# Patient Record
Sex: FEMALE | Race: WHITE | HISPANIC OR LATINO
[De-identification: names, ages, dates, MRNs, and addresses within clinical notes are randomized per-mention and may not be internally consistent; named-entity substitution may affect disease eponyms.]

---

## 2017-01-19 ENCOUNTER — RX RENEWAL (OUTPATIENT)
Age: 32
End: 2017-01-19

## 2017-01-24 ENCOUNTER — RESULT REVIEW (OUTPATIENT)
Age: 32
End: 2017-01-24

## 2017-02-06 ENCOUNTER — APPOINTMENT (OUTPATIENT)
Dept: INTERNAL MEDICINE | Facility: CLINIC | Age: 32
End: 2017-02-06

## 2017-02-08 ENCOUNTER — OUTPATIENT (OUTPATIENT)
Dept: OUTPATIENT SERVICES | Facility: HOSPITAL | Age: 32
LOS: 1 days | End: 2017-02-08
Payer: COMMERCIAL

## 2017-02-08 VITALS
DIASTOLIC BLOOD PRESSURE: 83 MMHG | SYSTOLIC BLOOD PRESSURE: 118 MMHG | TEMPERATURE: 98 F | RESPIRATION RATE: 16 BRPM | HEART RATE: 92 BPM | WEIGHT: 212.97 LBS | HEIGHT: 63.5 IN

## 2017-02-08 DIAGNOSIS — Z64.1 PROBLEMS RELATED TO MULTIPARITY: ICD-10-CM

## 2017-02-08 DIAGNOSIS — Z01.818 ENCOUNTER FOR OTHER PREPROCEDURAL EXAMINATION: ICD-10-CM

## 2017-02-08 DIAGNOSIS — G43.909 MIGRAINE, UNSPECIFIED, NOT INTRACTABLE, WITHOUT STATUS MIGRAINOSUS: ICD-10-CM

## 2017-02-08 DIAGNOSIS — F41.9 ANXIETY DISORDER, UNSPECIFIED: ICD-10-CM

## 2017-02-08 DIAGNOSIS — G47.00 INSOMNIA, UNSPECIFIED: ICD-10-CM

## 2017-02-08 LAB
ALBUMIN SERPL ELPH-MCNC: 3.9 G/DL — SIGNIFICANT CHANGE UP (ref 3.3–5)
ALP SERPL-CCNC: 79 U/L — SIGNIFICANT CHANGE UP (ref 40–120)
ALT FLD-CCNC: 25 U/L — SIGNIFICANT CHANGE UP (ref 12–78)
ANION GAP SERPL CALC-SCNC: 13 MMOL/L — SIGNIFICANT CHANGE UP (ref 5–17)
AST SERPL-CCNC: 19 U/L — SIGNIFICANT CHANGE UP (ref 15–37)
BILIRUB SERPL-MCNC: 0.3 MG/DL — SIGNIFICANT CHANGE UP (ref 0.2–1.2)
BUN SERPL-MCNC: 11 MG/DL — SIGNIFICANT CHANGE UP (ref 7–23)
CALCIUM SERPL-MCNC: 8.8 MG/DL — SIGNIFICANT CHANGE UP (ref 8.5–10.1)
CHLORIDE SERPL-SCNC: 104 MMOL/L — SIGNIFICANT CHANGE UP (ref 96–108)
CO2 SERPL-SCNC: 23 MMOL/L — SIGNIFICANT CHANGE UP (ref 22–31)
CREAT SERPL-MCNC: 0.6 MG/DL — SIGNIFICANT CHANGE UP (ref 0.5–1.3)
GLUCOSE SERPL-MCNC: 73 MG/DL — SIGNIFICANT CHANGE UP (ref 70–99)
HCG SERPL-ACNC: <1 MIU/ML — SIGNIFICANT CHANGE UP
HCT VFR BLD CALC: 39.9 % — SIGNIFICANT CHANGE UP (ref 34.5–45)
HGB BLD-MCNC: 13.5 G/DL — SIGNIFICANT CHANGE UP (ref 11.5–15.5)
MCHC RBC-ENTMCNC: 28.8 PG — SIGNIFICANT CHANGE UP (ref 27–34)
MCHC RBC-ENTMCNC: 33.9 GM/DL — SIGNIFICANT CHANGE UP (ref 32–36)
MCV RBC AUTO: 84.8 FL — SIGNIFICANT CHANGE UP (ref 80–100)
PLATELET # BLD AUTO: 266 K/UL — SIGNIFICANT CHANGE UP (ref 150–400)
POTASSIUM SERPL-MCNC: 3.5 MMOL/L — SIGNIFICANT CHANGE UP (ref 3.5–5.3)
POTASSIUM SERPL-SCNC: 3.5 MMOL/L — SIGNIFICANT CHANGE UP (ref 3.5–5.3)
PROT SERPL-MCNC: 8.1 G/DL — SIGNIFICANT CHANGE UP (ref 6–8.3)
RBC # BLD: 4.71 M/UL — SIGNIFICANT CHANGE UP (ref 3.8–5.2)
RBC # FLD: 12.2 % — SIGNIFICANT CHANGE UP (ref 10.3–14.5)
SODIUM SERPL-SCNC: 140 MMOL/L — SIGNIFICANT CHANGE UP (ref 135–145)
WBC # BLD: 8.7 K/UL — SIGNIFICANT CHANGE UP (ref 3.8–10.5)
WBC # FLD AUTO: 8.7 K/UL — SIGNIFICANT CHANGE UP (ref 3.8–10.5)

## 2017-02-08 RX ORDER — DULOXETINE HYDROCHLORIDE 30 MG/1
1 CAPSULE, DELAYED RELEASE ORAL
Qty: 0 | Refills: 0 | COMMUNITY

## 2017-02-08 NOTE — H&P PST ADULT - ASSESSMENT
32 yo female here with PMHX of insomnia, depression/anxiety, migraines, here for PST clearance for Laparoscopic bilateral tubal ligation with Dr. Acevedo on 02/16/2017

## 2017-02-08 NOTE — H&P PST ADULT - NSANTHOSAYNRD_GEN_A_CORE
No. REID screening performed.  STOP BANG Legend: 0-2 = LOW Risk; 3-4 = INTERMEDIATE Risk; 5-8 = HIGH Risk

## 2017-02-08 NOTE — H&P PST ADULT - PMH
Anxiety and depression    Diverticulitis of intestine without perforation or abscess with bleeding    Insomnia    Migraines    Problems related to multiparity

## 2017-02-08 NOTE — H&P PST ADULT - HISTORY OF PRESENT ILLNESS
30 yo female here for PST clearance with pmhx of depression, anxiety, insomnia here for Laparoscopic bilateral tubal ligation with Dr. Acevedo on 2017.  LMP: 2017 (does not have real periods, but does have cramping due to MIRENA).  .  Pt does not want anymore children.  Denies fevers, SOB, chest pain, back pain, general complaints, urinary or bowel issues, abdominal pain.

## 2017-02-08 NOTE — H&P PST ADULT - FAMILY HISTORY
Father  Still living? Unknown  Family history of colonic diverticulitis, Age at diagnosis: Age Unknown  Family history of Crohn's disease, Age at diagnosis: Age Unknown     Mother  Still living? Unknown  Family history of colonic diverticulitis, Age at diagnosis: Age Unknown

## 2017-02-08 NOTE — H&P PST ADULT - PROBLEM SELECTOR PLAN 1
PST clearance, labs drawn, Scheduled for surgery with Dr. Acevedo 02/16/2017  Instructions reviewed with patient  hibiclens given and instructed on use

## 2017-02-13 ENCOUNTER — APPOINTMENT (OUTPATIENT)
Dept: INTERNAL MEDICINE | Facility: CLINIC | Age: 32
End: 2017-02-13

## 2017-02-15 ENCOUNTER — RESULT REVIEW (OUTPATIENT)
Age: 32
End: 2017-02-15

## 2017-02-15 RX ORDER — SODIUM CHLORIDE 9 MG/ML
1000 INJECTION, SOLUTION INTRAVENOUS
Qty: 0 | Refills: 0 | Status: DISCONTINUED | OUTPATIENT
Start: 2017-02-16 | End: 2017-02-16

## 2017-02-15 RX ORDER — ACETAMINOPHEN 500 MG
975 TABLET ORAL ONCE
Qty: 0 | Refills: 0 | Status: COMPLETED | OUTPATIENT
Start: 2017-02-16 | End: 2017-02-16

## 2017-02-16 ENCOUNTER — OUTPATIENT (OUTPATIENT)
Dept: OUTPATIENT SERVICES | Facility: HOSPITAL | Age: 32
LOS: 1 days | Discharge: HOME | End: 2017-02-16
Payer: COMMERCIAL

## 2017-02-16 VITALS
HEART RATE: 86 BPM | HEIGHT: 63.5 IN | RESPIRATION RATE: 16 BRPM | SYSTOLIC BLOOD PRESSURE: 145 MMHG | DIASTOLIC BLOOD PRESSURE: 94 MMHG | OXYGEN SATURATION: 100 % | TEMPERATURE: 99 F | WEIGHT: 212.97 LBS

## 2017-02-16 VITALS
DIASTOLIC BLOOD PRESSURE: 74 MMHG | OXYGEN SATURATION: 98 % | RESPIRATION RATE: 14 BRPM | SYSTOLIC BLOOD PRESSURE: 115 MMHG | HEART RATE: 78 BPM

## 2017-02-16 DIAGNOSIS — Z64.1 PROBLEMS RELATED TO MULTIPARITY: ICD-10-CM

## 2017-02-16 DIAGNOSIS — Z01.818 ENCOUNTER FOR OTHER PREPROCEDURAL EXAMINATION: ICD-10-CM

## 2017-02-16 LAB — HCG UR QL: NEGATIVE — SIGNIFICANT CHANGE UP

## 2017-02-16 PROCEDURE — 88304 TISSUE EXAM BY PATHOLOGIST: CPT | Mod: 26

## 2017-02-16 PROCEDURE — 88300 SURGICAL PATH GROSS: CPT | Mod: 26,59

## 2017-02-16 RX ORDER — LEVONORGESTREL 1.5 MG/1
1 TABLET ORAL
Qty: 0 | Refills: 0 | COMMUNITY

## 2017-02-16 RX ORDER — OXYCODONE HYDROCHLORIDE 5 MG/1
5 TABLET ORAL ONCE
Qty: 0 | Refills: 0 | Status: DISCONTINUED | OUTPATIENT
Start: 2017-02-16 | End: 2017-02-16

## 2017-02-16 RX ORDER — SODIUM CHLORIDE 9 MG/ML
1000 INJECTION, SOLUTION INTRAVENOUS
Qty: 0 | Refills: 0 | Status: DISCONTINUED | OUTPATIENT
Start: 2017-02-16 | End: 2017-02-16

## 2017-02-16 RX ORDER — HYDROMORPHONE HYDROCHLORIDE 2 MG/ML
0.5 INJECTION INTRAMUSCULAR; INTRAVENOUS; SUBCUTANEOUS
Qty: 0 | Refills: 0 | Status: DISCONTINUED | OUTPATIENT
Start: 2017-02-16 | End: 2017-02-16

## 2017-02-16 RX ORDER — OXYCODONE HYDROCHLORIDE 5 MG/1
1 TABLET ORAL
Qty: 12 | Refills: 0 | OUTPATIENT
Start: 2017-02-16 | End: 2017-02-19

## 2017-02-16 RX ADMIN — SODIUM CHLORIDE 30 MILLILITER(S): 9 INJECTION, SOLUTION INTRAVENOUS at 10:42

## 2017-02-16 RX ADMIN — OXYCODONE HYDROCHLORIDE 5 MILLIGRAM(S): 5 TABLET ORAL at 14:20

## 2017-02-16 RX ADMIN — Medication 975 MILLIGRAM(S): at 10:29

## 2017-02-16 RX ADMIN — HYDROMORPHONE HYDROCHLORIDE 0.5 MILLIGRAM(S): 2 INJECTION INTRAMUSCULAR; INTRAVENOUS; SUBCUTANEOUS at 13:35

## 2017-02-16 RX ADMIN — HYDROMORPHONE HYDROCHLORIDE 0.5 MILLIGRAM(S): 2 INJECTION INTRAMUSCULAR; INTRAVENOUS; SUBCUTANEOUS at 13:22

## 2017-02-16 RX ADMIN — HYDROMORPHONE HYDROCHLORIDE 0.5 MILLIGRAM(S): 2 INJECTION INTRAMUSCULAR; INTRAVENOUS; SUBCUTANEOUS at 13:40

## 2017-02-16 RX ADMIN — HYDROMORPHONE HYDROCHLORIDE 0.5 MILLIGRAM(S): 2 INJECTION INTRAMUSCULAR; INTRAVENOUS; SUBCUTANEOUS at 13:07

## 2017-02-16 RX ADMIN — HYDROMORPHONE HYDROCHLORIDE 0.5 MILLIGRAM(S): 2 INJECTION INTRAMUSCULAR; INTRAVENOUS; SUBCUTANEOUS at 13:55

## 2017-02-16 RX ADMIN — SODIUM CHLORIDE 125 MILLILITER(S): 9 INJECTION, SOLUTION INTRAVENOUS at 13:08

## 2017-02-16 NOTE — BRIEF OPERATIVE NOTE - PROCEDURE
Removal of IUD  02/16/2017  mirena  Active  DAMI  Salpingectomy, partial, bilateral  02/16/2017  for sterilization  Active  DAMI

## 2017-02-16 NOTE — ASU DISCHARGE PLAN (ADULT/PEDIATRIC). - MEDICATION SUMMARY - MEDICATIONS TO TAKE
I will START or STAY ON the medications listed below when I get home from the hospital:    oxyCODONE 5 mg oral tablet  -- 1 tab(s) by mouth every 6 hours, As Needed MDD:4 tad/d  -- Caution federal law prohibits the transfer of this drug to any person other  than the person for whom it was prescribed.  It is very important that you take or use this exactly as directed.  Do not skip doses or discontinue unless directed by your doctor.  May cause drowsiness.  Alcohol may intensify this effect.  Use care when operating dangerous machinery.  This prescription cannot be refilled.  Using more of this medication than prescribed may cause serious breathing problems.    -- Indication: For for sever pain    ibuprofen 200 mg oral tablet  -- 3 tab(s) by mouth every 6 hours  -- Indication: For moderate pain    Excedrin oral tablet  -- 2 tab(s) by mouth every 6 hours, As Needed for migraines  -- Indication: For migraines    Imitrex 100 mg oral tablet  -- 1 tab(s) by mouth once, As Needed for migraines  -- Indication: For migraines    Fioricet oral capsule  -- 1 cap(s) by mouth every 4 hours, As Needed migraines  -- Indication: For migraines    Cymbalta 60 mg oral delayed release capsule  -- 1 cap(s) by mouth once a day  -- Indication: For anxiety    doxycycline hyclate 100 mg oral tablet  -- 1 tab(s) by mouth 2 times a day with food to avoid nausea, vomiting and stomach cramps  -- Avoid prolonged or excessive exposure to direct and/or artificial sunlight while taking this medication.  Do not take this drug if you are pregnant.  Finish all this medication unless otherwise directed by prescriber.  Medication should be taken with plenty of water.    -- Indication: For antibiotics    Ambien 10 mg oral tablet  -- 1 tab(s) by mouth once a day (at bedtime), As Needed  -- Indication: For insomnia    Xanax 0.25 mg oral tablet  -- 1 tab(s) by mouth 2 times a day  -- Indication: For anxiety

## 2017-02-16 NOTE — ASU DISCHARGE PLAN (ADULT/PEDIATRIC). - MEDICATION SUMMARY - MEDICATIONS TO STOP TAKING
I will STOP taking the medications listed below when I get home from the hospital:    Mirena 52 mg intrauterine device  -- 1 each intrauterine once

## 2017-02-16 NOTE — BRIEF OPERATIVE NOTE - OPERATION/FINDINGS
minimally elarged,anteverted uterus sounding to 9cm, nomal tubes and ovaries bilaterally   normal vagina and vulva

## 2017-02-17 LAB — SURGICAL PATHOLOGY FINAL REPORT - CH: SIGNIFICANT CHANGE UP

## 2017-02-21 ENCOUNTER — TRANSCRIPTION ENCOUNTER (OUTPATIENT)
Age: 32
End: 2017-02-21

## 2017-02-21 DIAGNOSIS — E66.9 OBESITY, UNSPECIFIED: ICD-10-CM

## 2017-02-21 DIAGNOSIS — F41.8 OTHER SPECIFIED ANXIETY DISORDERS: ICD-10-CM

## 2017-02-21 DIAGNOSIS — Z30.2 ENCOUNTER FOR STERILIZATION: ICD-10-CM

## 2017-02-22 DIAGNOSIS — Z30.432 ENCOUNTER FOR REMOVAL OF INTRAUTERINE CONTRACEPTIVE DEVICE: ICD-10-CM

## 2017-03-11 ENCOUNTER — MEDICATION RENEWAL (OUTPATIENT)
Age: 32
End: 2017-03-11

## 2017-03-17 ENCOUNTER — MEDICATION RENEWAL (OUTPATIENT)
Age: 32
End: 2017-03-17

## 2017-03-21 ENCOUNTER — FORM ENCOUNTER (OUTPATIENT)
Age: 32
End: 2017-03-21

## 2017-03-22 ENCOUNTER — RESULT REVIEW (OUTPATIENT)
Age: 32
End: 2017-03-22

## 2017-03-22 ENCOUNTER — APPOINTMENT (OUTPATIENT)
Dept: CT IMAGING | Facility: CLINIC | Age: 32
End: 2017-03-22

## 2017-03-22 ENCOUNTER — APPOINTMENT (OUTPATIENT)
Dept: INTERNAL MEDICINE | Facility: CLINIC | Age: 32
End: 2017-03-22

## 2017-03-22 ENCOUNTER — OUTPATIENT (OUTPATIENT)
Dept: OUTPATIENT SERVICES | Facility: HOSPITAL | Age: 32
LOS: 1 days | End: 2017-03-22
Payer: COMMERCIAL

## 2017-03-22 VITALS — DIASTOLIC BLOOD PRESSURE: 70 MMHG | HEART RATE: 84 BPM | SYSTOLIC BLOOD PRESSURE: 120 MMHG

## 2017-03-22 DIAGNOSIS — Z87.898 PERSONAL HISTORY OF OTHER SPECIFIED CONDITIONS: ICD-10-CM

## 2017-03-22 DIAGNOSIS — R10.9 UNSPECIFIED ABDOMINAL PAIN: ICD-10-CM

## 2017-03-22 LAB
ALBUMIN SERPL ELPH-MCNC: 4.3 G/DL
ALP BLD-CCNC: 70 U/L
ALT SERPL-CCNC: 18 U/L
AMYLASE/CREAT SERPL: 50 U/L
ANION GAP SERPL CALC-SCNC: 12 MMOL/L
AST SERPL-CCNC: 15 U/L
BASOPHILS # BLD AUTO: 0.02 K/UL
BASOPHILS NFR BLD AUTO: 0.2 %
BILIRUB SERPL-MCNC: 0.4 MG/DL
BILIRUB UR QL STRIP: NEGATIVE
BUN SERPL-MCNC: 10 MG/DL
CALCIUM SERPL-MCNC: 9.8 MG/DL
CHLORIDE SERPL-SCNC: 103 MMOL/L
CLARITY UR: CLEAR
CO2 SERPL-SCNC: 23 MMOL/L
COLLECTION METHOD: NORMAL
CREAT SERPL-MCNC: 0.72 MG/DL
EOSINOPHIL # BLD AUTO: 0.24 K/UL
EOSINOPHIL NFR BLD AUTO: 2.5 %
GLUCOSE SERPL-MCNC: 98 MG/DL
GLUCOSE UR-MCNC: NEGATIVE
HCG UR QL: 0.2 EU/DL
HCT VFR BLD CALC: 39.7 %
HGB BLD-MCNC: 13.4 G/DL
HGB UR QL STRIP.AUTO: ABNORMAL
IMM GRANULOCYTES NFR BLD AUTO: 0.2 %
KETONES UR-MCNC: NEGATIVE
LEUKOCYTE ESTERASE UR QL STRIP: NEGATIVE
LPL SERPL-CCNC: 40 U/L
LYMPHOCYTES # BLD AUTO: 2.83 K/UL
LYMPHOCYTES NFR BLD AUTO: 30 %
MAN DIFF?: NORMAL
MCHC RBC-ENTMCNC: 28.8 PG
MCHC RBC-ENTMCNC: 33.8 GM/DL
MCV RBC AUTO: 85.2 FL
MONOCYTES # BLD AUTO: 0.76 K/UL
MONOCYTES NFR BLD AUTO: 8.1 %
NEUTROPHILS # BLD AUTO: 5.57 K/UL
NEUTROPHILS NFR BLD AUTO: 59 %
NITRITE UR QL STRIP: NEGATIVE
PH UR STRIP: 6
PLATELET # BLD AUTO: 234 K/UL
POTASSIUM SERPL-SCNC: 4 MMOL/L
PROT SERPL-MCNC: 7.9 G/DL
PROT UR STRIP-MCNC: NEGATIVE
RBC # BLD: 4.66 M/UL
RBC # FLD: 13.1 %
SODIUM SERPL-SCNC: 138 MMOL/L
SP GR UR STRIP: 1.02
WBC # FLD AUTO: 9.44 K/UL

## 2017-03-22 PROCEDURE — 74177 CT ABD & PELVIS W/CONTRAST: CPT

## 2017-03-22 RX ORDER — DOXYCYCLINE HYCLATE 100 MG/1
100 TABLET ORAL
Qty: 10 | Refills: 0 | Status: COMPLETED | COMMUNITY
Start: 2017-02-16

## 2017-03-24 ENCOUNTER — RESULT REVIEW (OUTPATIENT)
Age: 32
End: 2017-03-24

## 2017-03-24 ENCOUNTER — APPOINTMENT (OUTPATIENT)
Dept: INTERNAL MEDICINE | Facility: CLINIC | Age: 32
End: 2017-03-24

## 2017-03-24 VITALS — DIASTOLIC BLOOD PRESSURE: 70 MMHG | SYSTOLIC BLOOD PRESSURE: 120 MMHG | HEART RATE: 84 BPM

## 2017-03-24 LAB — BACTERIA UR CULT: NORMAL

## 2017-03-28 ENCOUNTER — APPOINTMENT (OUTPATIENT)
Dept: INTERNAL MEDICINE | Facility: CLINIC | Age: 32
End: 2017-03-28

## 2017-03-28 VITALS — SYSTOLIC BLOOD PRESSURE: 120 MMHG | DIASTOLIC BLOOD PRESSURE: 80 MMHG

## 2017-03-28 DIAGNOSIS — J06.9 ACUTE UPPER RESPIRATORY INFECTION, UNSPECIFIED: ICD-10-CM

## 2017-03-29 ENCOUNTER — RESULT REVIEW (OUTPATIENT)
Age: 32
End: 2017-03-29

## 2017-03-29 LAB
ALBUMIN SERPL ELPH-MCNC: 4 G/DL
ALP BLD-CCNC: 59 U/L
ALT SERPL-CCNC: 36 U/L
ANION GAP SERPL CALC-SCNC: 14 MMOL/L
AST SERPL-CCNC: 28 U/L
BILIRUB SERPL-MCNC: 0.2 MG/DL
BUN SERPL-MCNC: 17 MG/DL
CALCIUM SERPL-MCNC: 9.6 MG/DL
CHLORIDE SERPL-SCNC: 103 MMOL/L
CO2 SERPL-SCNC: 22 MMOL/L
CREAT SERPL-MCNC: 0.8 MG/DL
GLUCOSE SERPL-MCNC: 75 MG/DL
POTASSIUM SERPL-SCNC: 4.2 MMOL/L
PROT SERPL-MCNC: 7.3 G/DL
SODIUM SERPL-SCNC: 138 MMOL/L

## 2017-03-30 ENCOUNTER — RESULT REVIEW (OUTPATIENT)
Age: 32
End: 2017-03-30

## 2017-03-30 ENCOUNTER — APPOINTMENT (OUTPATIENT)
Dept: INTERNAL MEDICINE | Facility: CLINIC | Age: 32
End: 2017-03-30

## 2017-03-30 LAB
BASOPHILS # BLD AUTO: 0.02 K/UL
BASOPHILS NFR BLD AUTO: 0.2 %
EOSINOPHIL # BLD AUTO: 0.29 K/UL
EOSINOPHIL NFR BLD AUTO: 2.7 %
HCT VFR BLD CALC: 39.9 %
HGB BLD-MCNC: 13.2 G/DL
IMM GRANULOCYTES NFR BLD AUTO: 0.3 %
LYMPHOCYTES # BLD AUTO: 3.5 K/UL
LYMPHOCYTES NFR BLD AUTO: 32.7 %
MAN DIFF?: NORMAL
MCHC RBC-ENTMCNC: 28.4 PG
MCHC RBC-ENTMCNC: 33.1 GM/DL
MCV RBC AUTO: 86 FL
MONOCYTES # BLD AUTO: 0.69 K/UL
MONOCYTES NFR BLD AUTO: 6.4 %
NEUTROPHILS # BLD AUTO: 6.18 K/UL
NEUTROPHILS NFR BLD AUTO: 57.7 %
PLATELET # BLD AUTO: 284 K/UL
RBC # BLD: 4.64 M/UL
RBC # FLD: 13.4 %
WBC # FLD AUTO: 10.71 K/UL

## 2017-04-03 ENCOUNTER — RX RENEWAL (OUTPATIENT)
Age: 32
End: 2017-04-03

## 2017-04-06 ENCOUNTER — MEDICATION RENEWAL (OUTPATIENT)
Age: 32
End: 2017-04-06

## 2017-04-11 ENCOUNTER — LABORATORY RESULT (OUTPATIENT)
Age: 32
End: 2017-04-11

## 2017-04-12 ENCOUNTER — CLINICAL ADVICE (OUTPATIENT)
Age: 32
End: 2017-04-12

## 2017-04-18 ENCOUNTER — RX RENEWAL (OUTPATIENT)
Age: 32
End: 2017-04-18

## 2017-04-20 ENCOUNTER — APPOINTMENT (OUTPATIENT)
Dept: INTERNAL MEDICINE | Facility: CLINIC | Age: 32
End: 2017-04-20

## 2017-04-20 ENCOUNTER — LABORATORY RESULT (OUTPATIENT)
Age: 32
End: 2017-04-20

## 2017-04-20 VITALS — DIASTOLIC BLOOD PRESSURE: 80 MMHG | SYSTOLIC BLOOD PRESSURE: 124 MMHG | HEART RATE: 72 BPM | RESPIRATION RATE: 14 BRPM

## 2017-04-21 ENCOUNTER — APPOINTMENT (OUTPATIENT)
Dept: CT IMAGING | Facility: CLINIC | Age: 32
End: 2017-04-21

## 2017-04-21 LAB
ALBUMIN SERPL ELPH-MCNC: 4 G/DL
ALP BLD-CCNC: 69 U/L
ALT SERPL-CCNC: 33 U/L
ANION GAP SERPL CALC-SCNC: 19 MMOL/L
APPEARANCE: CLEAR
AST SERPL-CCNC: 28 U/L
BILIRUB SERPL-MCNC: 0.2 MG/DL
BILIRUBIN URINE: NEGATIVE
BLOOD URINE: NEGATIVE
BUN SERPL-MCNC: 13 MG/DL
CALCIUM SERPL-MCNC: 9.8 MG/DL
CHLORIDE SERPL-SCNC: 100 MMOL/L
CO2 SERPL-SCNC: 18 MMOL/L
COLOR: YELLOW
CREAT SERPL-MCNC: 0.9 MG/DL
GLUCOSE QUALITATIVE U: NORMAL MG/DL
GLUCOSE SERPL-MCNC: 114 MG/DL
KETONES URINE: NEGATIVE
LEUKOCYTE ESTERASE URINE: NEGATIVE
NITRITE URINE: NEGATIVE
PH URINE: 6.5
POTASSIUM SERPL-SCNC: 4 MMOL/L
PROT SERPL-MCNC: 7.8 G/DL
PROTEIN URINE: NEGATIVE MG/DL
SODIUM SERPL-SCNC: 137 MMOL/L
SPECIFIC GRAVITY URINE: 1.03
UROBILINOGEN URINE: NORMAL MG/DL

## 2017-04-22 ENCOUNTER — APPOINTMENT (OUTPATIENT)
Dept: CT IMAGING | Facility: CLINIC | Age: 32
End: 2017-04-22

## 2017-04-22 ENCOUNTER — APPOINTMENT (OUTPATIENT)
Dept: INTERNAL MEDICINE | Facility: CLINIC | Age: 32
End: 2017-04-22

## 2017-04-22 LAB
BASOPHILS # BLD AUTO: 0.02 K/UL
BASOPHILS NFR BLD AUTO: 0.2 %
EOSINOPHIL # BLD AUTO: 0.14 K/UL
EOSINOPHIL NFR BLD AUTO: 1.4 %
HCT VFR BLD CALC: 41.8 %
HGB BLD-MCNC: 12.9 G/DL
IMM GRANULOCYTES NFR BLD AUTO: 0.2 %
LYMPHOCYTES # BLD AUTO: 2.68 K/UL
LYMPHOCYTES NFR BLD AUTO: 26.5 %
MAN DIFF?: NORMAL
MCHC RBC-ENTMCNC: 28.1 PG
MCHC RBC-ENTMCNC: 30.9 GM/DL
MCV RBC AUTO: 91.1 FL
MONOCYTES # BLD AUTO: 0.57 K/UL
MONOCYTES NFR BLD AUTO: 5.6 %
NEUTROPHILS # BLD AUTO: 6.7 K/UL
NEUTROPHILS NFR BLD AUTO: 66.1 %
PLATELET # BLD AUTO: 270 K/UL
RBC # BLD: 4.59 M/UL
RBC # FLD: 14.2 %
WBC # FLD AUTO: 10.13 K/UL

## 2017-05-08 ENCOUNTER — MEDICATION RENEWAL (OUTPATIENT)
Age: 32
End: 2017-05-08

## 2017-05-17 ENCOUNTER — MEDICATION RENEWAL (OUTPATIENT)
Age: 32
End: 2017-05-17

## 2017-05-24 ENCOUNTER — MEDICATION RENEWAL (OUTPATIENT)
Age: 32
End: 2017-05-24

## 2017-06-06 ENCOUNTER — RX RENEWAL (OUTPATIENT)
Age: 32
End: 2017-06-06

## 2017-06-17 ENCOUNTER — APPOINTMENT (OUTPATIENT)
Dept: INTERNAL MEDICINE | Facility: CLINIC | Age: 32
End: 2017-06-17

## 2017-06-17 VITALS — HEART RATE: 72 BPM | DIASTOLIC BLOOD PRESSURE: 70 MMHG | SYSTOLIC BLOOD PRESSURE: 120 MMHG | RESPIRATION RATE: 14 BRPM

## 2017-06-17 RX ORDER — CIPROFLOXACIN HYDROCHLORIDE 500 MG/1
500 TABLET, FILM COATED ORAL
Qty: 20 | Refills: 0 | Status: DISCONTINUED | COMMUNITY
Start: 2017-03-22 | End: 2017-06-17

## 2017-06-17 RX ORDER — AMOXICILLIN AND CLAVULANATE POTASSIUM 875; 125 MG/1; MG/1
875-125 TABLET, COATED ORAL
Qty: 20 | Refills: 0 | Status: DISCONTINUED | COMMUNITY
Start: 2017-04-20 | End: 2017-06-17

## 2017-06-17 RX ORDER — METRONIDAZOLE 500 MG/1
500 TABLET ORAL EVERY 8 HOURS
Qty: 30 | Refills: 0 | Status: DISCONTINUED | COMMUNITY
Start: 2017-03-22 | End: 2017-06-17

## 2017-06-22 ENCOUNTER — APPOINTMENT (OUTPATIENT)
Dept: INTERNAL MEDICINE | Facility: CLINIC | Age: 32
End: 2017-06-22

## 2017-06-23 ENCOUNTER — APPOINTMENT (OUTPATIENT)
Dept: INTERNAL MEDICINE | Facility: CLINIC | Age: 32
End: 2017-06-23

## 2017-06-23 VITALS
SYSTOLIC BLOOD PRESSURE: 120 MMHG | DIASTOLIC BLOOD PRESSURE: 70 MMHG | RESPIRATION RATE: 14 BRPM | TEMPERATURE: 97.8 F | HEART RATE: 72 BPM

## 2017-06-26 LAB — BACTERIA THROAT CULT: NORMAL

## 2017-06-28 ENCOUNTER — MEDICATION RENEWAL (OUTPATIENT)
Age: 32
End: 2017-06-28

## 2017-06-28 DIAGNOSIS — T75.3XXA MOTION SICKNESS, INITIAL ENCOUNTER: ICD-10-CM

## 2017-07-03 ENCOUNTER — RX RENEWAL (OUTPATIENT)
Age: 32
End: 2017-07-03

## 2017-07-17 ENCOUNTER — MEDICATION RENEWAL (OUTPATIENT)
Age: 32
End: 2017-07-17

## 2017-07-31 ENCOUNTER — APPOINTMENT (OUTPATIENT)
Dept: INTERNAL MEDICINE | Facility: CLINIC | Age: 32
End: 2017-07-31
Payer: COMMERCIAL

## 2017-07-31 PROCEDURE — 99213 OFFICE O/P EST LOW 20 MIN: CPT | Mod: 25

## 2017-08-01 ENCOUNTER — RX RENEWAL (OUTPATIENT)
Age: 32
End: 2017-08-01

## 2017-08-14 ENCOUNTER — RX RENEWAL (OUTPATIENT)
Age: 32
End: 2017-08-14

## 2017-08-14 ENCOUNTER — MEDICATION RENEWAL (OUTPATIENT)
Age: 32
End: 2017-08-14

## 2017-09-14 ENCOUNTER — MEDICATION RENEWAL (OUTPATIENT)
Age: 32
End: 2017-09-14

## 2017-09-15 ENCOUNTER — MEDICATION RENEWAL (OUTPATIENT)
Age: 32
End: 2017-09-15

## 2017-09-16 ENCOUNTER — APPOINTMENT (OUTPATIENT)
Dept: INTERNAL MEDICINE | Facility: CLINIC | Age: 32
End: 2017-09-16
Payer: COMMERCIAL

## 2017-09-16 VITALS — HEART RATE: 72 BPM | DIASTOLIC BLOOD PRESSURE: 70 MMHG | SYSTOLIC BLOOD PRESSURE: 120 MMHG | RESPIRATION RATE: 14 BRPM

## 2017-09-16 PROCEDURE — 99214 OFFICE O/P EST MOD 30 MIN: CPT | Mod: 25

## 2017-09-16 PROCEDURE — 36415 COLL VENOUS BLD VENIPUNCTURE: CPT

## 2017-09-21 ENCOUNTER — MEDICATION RENEWAL (OUTPATIENT)
Age: 32
End: 2017-09-21

## 2017-09-21 LAB
ADJUSTED MITOGEN: 9.08 IU/ML
ADJUSTED TB AG: -0.04 IU/ML
ALBUMIN SERPL ELPH-MCNC: 4.5 G/DL
ALP BLD-CCNC: 69 U/L
ALT SERPL-CCNC: 13 U/L
ANION GAP SERPL CALC-SCNC: 13 MMOL/L
AST SERPL-CCNC: 23 U/L
BASOPHILS # BLD AUTO: 0.02 K/UL
BASOPHILS NFR BLD AUTO: 0.3 %
BILIRUB SERPL-MCNC: 0.2 MG/DL
BUN SERPL-MCNC: 16 MG/DL
CALCIUM SERPL-MCNC: 9.7 MG/DL
CHLORIDE SERPL-SCNC: 105 MMOL/L
CO2 SERPL-SCNC: 20 MMOL/L
CREAT SERPL-MCNC: 0.72 MG/DL
EOSINOPHIL # BLD AUTO: 0.16 K/UL
EOSINOPHIL NFR BLD AUTO: 2.2 %
FOLATE SERPL-MCNC: 10.6 NG/ML
GLUCOSE SERPL-MCNC: 82 MG/DL
HBA1C MFR BLD HPLC: 5.1 %
HCT VFR BLD CALC: 38.3 %
HGB BLD-MCNC: 12.6 G/DL
IMM GRANULOCYTES NFR BLD AUTO: 0.1 %
LYMPHOCYTES # BLD AUTO: 2.49 K/UL
LYMPHOCYTES NFR BLD AUTO: 34 %
M TB IFN-G BLD-IMP: NEGATIVE
MAN DIFF?: NORMAL
MCHC RBC-ENTMCNC: 27.3 PG
MCHC RBC-ENTMCNC: 32.9 GM/DL
MCV RBC AUTO: 83.1 FL
MONOCYTES # BLD AUTO: 0.48 K/UL
MONOCYTES NFR BLD AUTO: 6.6 %
NEUTROPHILS # BLD AUTO: 4.16 K/UL
NEUTROPHILS NFR BLD AUTO: 56.8 %
PLATELET # BLD AUTO: 280 K/UL
POTASSIUM SERPL-SCNC: 4.2 MMOL/L
PROT SERPL-MCNC: 7.9 G/DL
QUANTIFERON GOLD NIL: 0.12 IU/ML
RBC # BLD: 4.61 M/UL
RBC # FLD: 13.3 %
SODIUM SERPL-SCNC: 138 MMOL/L
TSH SERPL-ACNC: 1.54 UIU/ML
VIT B12 SERPL-MCNC: 609 PG/ML
WBC # FLD AUTO: 7.32 K/UL

## 2017-09-21 RX ORDER — CYCLOBENZAPRINE HYDROCHLORIDE 5 MG/1
5 TABLET, FILM COATED ORAL
Qty: 30 | Refills: 0 | Status: COMPLETED | COMMUNITY
Start: 2017-09-21 | End: 2017-10-01

## 2017-09-28 ENCOUNTER — MEDICATION RENEWAL (OUTPATIENT)
Age: 32
End: 2017-09-28

## 2017-09-30 ENCOUNTER — OUTPATIENT (OUTPATIENT)
Dept: OUTPATIENT SERVICES | Facility: HOSPITAL | Age: 32
LOS: 1 days | End: 2017-09-30
Payer: COMMERCIAL

## 2017-09-30 ENCOUNTER — APPOINTMENT (OUTPATIENT)
Dept: MRI IMAGING | Facility: CLINIC | Age: 32
End: 2017-09-30
Payer: COMMERCIAL

## 2017-09-30 DIAGNOSIS — Z00.8 ENCOUNTER FOR OTHER GENERAL EXAMINATION: ICD-10-CM

## 2017-09-30 PROCEDURE — 72148 MRI LUMBAR SPINE W/O DYE: CPT

## 2017-09-30 PROCEDURE — 72148 MRI LUMBAR SPINE W/O DYE: CPT | Mod: 26

## 2017-10-04 ENCOUNTER — MEDICATION RENEWAL (OUTPATIENT)
Age: 32
End: 2017-10-04

## 2017-10-05 ENCOUNTER — APPOINTMENT (OUTPATIENT)
Dept: INTERNAL MEDICINE | Facility: CLINIC | Age: 32
End: 2017-10-05
Payer: COMMERCIAL

## 2017-10-05 PROCEDURE — 36415 COLL VENOUS BLD VENIPUNCTURE: CPT

## 2017-10-05 PROCEDURE — 99213 OFFICE O/P EST LOW 20 MIN: CPT | Mod: 25

## 2017-10-06 LAB
BASOPHILS # BLD AUTO: 0.02 K/UL
BASOPHILS NFR BLD AUTO: 0.2 %
CCP AB SER IA-ACNC: <8 UNITS
DSDNA AB SER-ACNC: <12 IU/ML
EOSINOPHIL # BLD AUTO: 0.17 K/UL
EOSINOPHIL NFR BLD AUTO: 1.5 %
ERYTHROCYTE [SEDIMENTATION RATE] IN BLOOD BY WESTERGREN METHOD: 28 MM/HR
HCT VFR BLD CALC: 41.6 %
HGB BLD-MCNC: 14 G/DL
IMM GRANULOCYTES NFR BLD AUTO: 0.8 %
LYMPHOCYTES # BLD AUTO: 3.67 K/UL
LYMPHOCYTES NFR BLD AUTO: 32.7 %
MAN DIFF?: NORMAL
MCHC RBC-ENTMCNC: 27.7 PG
MCHC RBC-ENTMCNC: 33.7 GM/DL
MCV RBC AUTO: 82.2 FL
MONOCYTES # BLD AUTO: 0.82 K/UL
MONOCYTES NFR BLD AUTO: 7.3 %
NEUTROPHILS # BLD AUTO: 6.45 K/UL
NEUTROPHILS NFR BLD AUTO: 57.5 %
PLATELET # BLD AUTO: 351 K/UL
RBC # BLD: 5.06 M/UL
RBC # FLD: 13.2 %
RF+CCP IGG SER-IMP: NEGATIVE
RHEUMATOID FACT SER QL: 17 IU/ML
WBC # FLD AUTO: 11.22 K/UL

## 2017-10-11 LAB
ANA SER IF-ACNC: NEGATIVE
HLA-B27 RELATED AG QL: NORMAL

## 2017-10-14 ENCOUNTER — MEDICATION RENEWAL (OUTPATIENT)
Age: 32
End: 2017-10-14

## 2017-10-19 ENCOUNTER — FORM ENCOUNTER (OUTPATIENT)
Age: 32
End: 2017-10-19

## 2017-10-20 ENCOUNTER — APPOINTMENT (OUTPATIENT)
Dept: MRI IMAGING | Facility: CLINIC | Age: 32
End: 2017-10-20
Payer: COMMERCIAL

## 2017-10-20 ENCOUNTER — OUTPATIENT (OUTPATIENT)
Dept: OUTPATIENT SERVICES | Facility: HOSPITAL | Age: 32
LOS: 1 days | End: 2017-10-20
Payer: COMMERCIAL

## 2017-10-20 DIAGNOSIS — Z00.8 ENCOUNTER FOR OTHER GENERAL EXAMINATION: ICD-10-CM

## 2017-10-20 PROCEDURE — 72146 MRI CHEST SPINE W/O DYE: CPT | Mod: 26

## 2017-10-20 PROCEDURE — 72146 MRI CHEST SPINE W/O DYE: CPT

## 2017-10-25 ENCOUNTER — MEDICATION RENEWAL (OUTPATIENT)
Age: 32
End: 2017-10-25

## 2017-10-30 ENCOUNTER — MEDICATION RENEWAL (OUTPATIENT)
Age: 32
End: 2017-10-30

## 2017-11-16 ENCOUNTER — APPOINTMENT (OUTPATIENT)
Dept: RHEUMATOLOGY | Facility: CLINIC | Age: 32
End: 2017-11-16

## 2017-11-18 ENCOUNTER — MEDICATION RENEWAL (OUTPATIENT)
Age: 32
End: 2017-11-18

## 2017-11-25 ENCOUNTER — TRANSCRIPTION ENCOUNTER (OUTPATIENT)
Age: 32
End: 2017-11-25

## 2017-11-28 ENCOUNTER — APPOINTMENT (OUTPATIENT)
Dept: ORTHOPEDIC SURGERY | Facility: CLINIC | Age: 32
End: 2017-11-28
Payer: COMMERCIAL

## 2017-11-28 VITALS
SYSTOLIC BLOOD PRESSURE: 117 MMHG | BODY MASS INDEX: 34.15 KG/M2 | DIASTOLIC BLOOD PRESSURE: 84 MMHG | HEIGHT: 64 IN | WEIGHT: 200 LBS | HEART RATE: 77 BPM

## 2017-11-28 PROCEDURE — 99204 OFFICE O/P NEW MOD 45 MIN: CPT

## 2017-11-29 ENCOUNTER — APPOINTMENT (OUTPATIENT)
Dept: INTERNAL MEDICINE | Facility: CLINIC | Age: 32
End: 2017-11-29
Payer: COMMERCIAL

## 2017-11-29 PROCEDURE — 36415 COLL VENOUS BLD VENIPUNCTURE: CPT

## 2017-11-30 LAB
25(OH)D3 SERPL-MCNC: 23.6 NG/ML
APPEARANCE: CLEAR
AST SERPL-CCNC: 18 U/L
BASOPHILS # BLD AUTO: 0.01 K/UL
BASOPHILS NFR BLD AUTO: 0.1 %
BILIRUBIN URINE: NEGATIVE
BLOOD URINE: NEGATIVE
CHOLEST SERPL-MCNC: 217 MG/DL
CHOLEST/HDLC SERPL: 3.3 RATIO
COLOR: YELLOW
EOSINOPHIL NFR BLD AUTO: 1.5 %
FOLATE SERPL-MCNC: 7.4 NG/ML
GLUCOSE QUALITATIVE U: NEGATIVE MG/DL
HBA1C MFR BLD HPLC: 5 %
HCT VFR BLD CALC: 36.6 %
HDLC SERPL-MCNC: 65 MG/DL
HGB BLD-MCNC: 12.1 G/DL
IMM GRANULOCYTES NFR BLD AUTO: 0.3 %
KETONES URINE: NEGATIVE
LDLC SERPL CALC-MCNC: 141 MG/DL
LEUKOCYTE ESTERASE URINE: NEGATIVE
LYMPHOCYTES # BLD AUTO: 1.94 K/UL
LYMPHOCYTES NFR BLD AUTO: 26.6 %
MAN DIFF?: NORMAL
MCHC RBC-ENTMCNC: 33.1 GM/DL
MCV RBC AUTO: 85.3 FL
MONOCYTES # BLD AUTO: 0.5 K/UL
MONOCYTES NFR BLD AUTO: 6.9 %
NEUTROPHILS # BLD AUTO: 4.7 K/UL
NITRITE URINE: NEGATIVE
PH URINE: 6.5
PLATELET # BLD AUTO: 303 K/UL
PROTEIN URINE: NEGATIVE MG/DL
RBC # BLD: 4.29 M/UL
RBC # FLD: 14.9 %
SPECIFIC GRAVITY URINE: 1.02
T4 SERPL-MCNC: 9.6 UG/DL
TRIGL SERPL-MCNC: 57 MG/DL
TSH SERPL-ACNC: 1.27 UIU/ML
UROBILINOGEN URINE: NEGATIVE MG/DL
VIT B12 SERPL-MCNC: 365 PG/ML
WBC # FLD AUTO: 7.28 K/UL

## 2017-12-07 ENCOUNTER — NON-APPOINTMENT (OUTPATIENT)
Age: 32
End: 2017-12-07

## 2017-12-07 ENCOUNTER — APPOINTMENT (OUTPATIENT)
Dept: INTERNAL MEDICINE | Facility: CLINIC | Age: 32
End: 2017-12-07
Payer: COMMERCIAL

## 2017-12-07 VITALS — SYSTOLIC BLOOD PRESSURE: 120 MMHG | DIASTOLIC BLOOD PRESSURE: 80 MMHG | HEART RATE: 72 BPM | RESPIRATION RATE: 14 BRPM

## 2017-12-07 PROCEDURE — 93000 ELECTROCARDIOGRAM COMPLETE: CPT

## 2017-12-07 PROCEDURE — 99395 PREV VISIT EST AGE 18-39: CPT | Mod: 25

## 2017-12-07 PROCEDURE — 94010 BREATHING CAPACITY TEST: CPT

## 2017-12-07 RX ORDER — OXYCODONE AND ACETAMINOPHEN 5; 325 MG/1; MG/1
5-325 TABLET ORAL
Qty: 40 | Refills: 0 | Status: COMPLETED | COMMUNITY
Start: 2017-10-27

## 2017-12-07 RX ORDER — TRAMADOL HYDROCHLORIDE 50 MG/1
50 TABLET, COATED ORAL TWICE DAILY
Qty: 2 | Refills: 0 | Status: DISCONTINUED | COMMUNITY
Start: 2017-11-21 | End: 2017-12-07

## 2017-12-07 RX ORDER — SCOPALAMINE 1 MG/3D
1 PATCH, EXTENDED RELEASE TRANSDERMAL
Qty: 1 | Refills: 0 | Status: DISCONTINUED | COMMUNITY
Start: 2017-06-28 | End: 2017-12-07

## 2017-12-07 RX ORDER — ALPRAZOLAM 0.25 MG/1
0.25 TABLET ORAL
Refills: 0 | Status: COMPLETED | COMMUNITY

## 2017-12-07 RX ORDER — FLUCONAZOLE 150 MG/1
150 TABLET ORAL
Qty: 1 | Refills: 0 | Status: DISCONTINUED | COMMUNITY
Start: 2017-10-30 | End: 2017-12-07

## 2017-12-07 RX ORDER — MONTELUKAST 10 MG/1
10 TABLET, FILM COATED ORAL
Qty: 30 | Refills: 1 | Status: DISCONTINUED | COMMUNITY
Start: 2017-07-31 | End: 2017-12-07

## 2017-12-07 RX ORDER — METHYLPREDNISOLONE 4 MG/1
4 TABLET ORAL
Qty: 1 | Refills: 0 | Status: DISCONTINUED | COMMUNITY
Start: 2017-08-03 | End: 2017-12-07

## 2017-12-07 RX ORDER — NYSTATIN 100000 [USP'U]/G
100000 CREAM TOPICAL TWICE DAILY
Qty: 1 | Refills: 0 | Status: DISCONTINUED | COMMUNITY
Start: 2017-10-30 | End: 2017-12-07

## 2017-12-07 RX ORDER — LORATADINE 5 MG/5 ML
10-240 SOLUTION, ORAL ORAL DAILY
Qty: 30 | Refills: 0 | Status: DISCONTINUED | COMMUNITY
Start: 2017-08-01 | End: 2017-12-07

## 2017-12-07 RX ORDER — ETHYNODIOL DIACETATE AND ETHINYL ESTRADIOL 1 MG-35MCG
1-35 KIT ORAL
Qty: 28 | Refills: 0 | Status: COMPLETED | COMMUNITY
Start: 2017-05-05

## 2017-12-07 RX ORDER — HYDROCODONE BITARTRATE AND ACETAMINOPHEN 7.5; 325 MG/1; MG/1
7.5-325 TABLET ORAL
Qty: 10 | Refills: 0 | Status: DISCONTINUED | COMMUNITY
Start: 2017-03-22 | End: 2017-12-07

## 2017-12-07 RX ORDER — GABAPENTIN 300 MG/1
300 CAPSULE ORAL 3 TIMES DAILY
Qty: 90 | Refills: 0 | Status: DISCONTINUED | COMMUNITY
Start: 2017-10-04 | End: 2017-12-07

## 2017-12-07 RX ORDER — ALBUTEROL SULFATE 90 UG/1
108 (90 BASE) AEROSOL, METERED RESPIRATORY (INHALATION)
Qty: 1 | Refills: 1 | Status: DISCONTINUED | COMMUNITY
Start: 2017-07-31 | End: 2017-12-07

## 2017-12-07 RX ORDER — ZOLPIDEM TARTRATE 10 MG/1
10 TABLET, FILM COATED ORAL
Refills: 0 | Status: COMPLETED | COMMUNITY

## 2017-12-08 ENCOUNTER — TRANSCRIPTION ENCOUNTER (OUTPATIENT)
Age: 32
End: 2017-12-08

## 2017-12-09 ENCOUNTER — APPOINTMENT (OUTPATIENT)
Dept: INTERNAL MEDICINE | Facility: CLINIC | Age: 32
End: 2017-12-09

## 2017-12-13 LAB
ALBUMIN SERPL ELPH-MCNC: 4.1 G/DL
ALP BLD-CCNC: 64 U/L
ALT SERPL-CCNC: 25 U/L
ANION GAP SERPL CALC-SCNC: 15 MMOL/L
BILIRUB SERPL-MCNC: 0.2 MG/DL
BUN SERPL-MCNC: 16 MG/DL
CALCIUM SERPL-MCNC: 9.8 MG/DL
CHLORIDE SERPL-SCNC: 103 MMOL/L
CO2 SERPL-SCNC: 21 MMOL/L
CREAT SERPL-MCNC: 0.72 MG/DL
GLUCOSE SERPL-MCNC: 77 MG/DL
POTASSIUM SERPL-SCNC: 4.1 MMOL/L
PROT SERPL-MCNC: 7.8 G/DL
SODIUM SERPL-SCNC: 139 MMOL/L

## 2017-12-22 ENCOUNTER — APPOINTMENT (OUTPATIENT)
Dept: INTERNAL MEDICINE | Facility: CLINIC | Age: 32
End: 2017-12-22

## 2017-12-28 ENCOUNTER — RX RENEWAL (OUTPATIENT)
Age: 32
End: 2017-12-28

## 2017-12-29 ENCOUNTER — APPOINTMENT (OUTPATIENT)
Dept: INTERNAL MEDICINE | Facility: CLINIC | Age: 32
End: 2017-12-29
Payer: COMMERCIAL

## 2017-12-29 ENCOUNTER — APPOINTMENT (OUTPATIENT)
Dept: INTERNAL MEDICINE | Facility: CLINIC | Age: 32
End: 2017-12-29

## 2017-12-29 VITALS — SYSTOLIC BLOOD PRESSURE: 100 MMHG | HEART RATE: 96 BPM | DIASTOLIC BLOOD PRESSURE: 60 MMHG

## 2017-12-29 PROCEDURE — 99213 OFFICE O/P EST LOW 20 MIN: CPT | Mod: 25

## 2017-12-29 PROCEDURE — 85018 HEMOGLOBIN: CPT | Mod: QW

## 2017-12-29 PROCEDURE — 36415 COLL VENOUS BLD VENIPUNCTURE: CPT

## 2018-01-02 ENCOUNTER — RESULT CHARGE (OUTPATIENT)
Age: 33
End: 2018-01-02

## 2018-01-02 ENCOUNTER — RESULT REVIEW (OUTPATIENT)
Age: 33
End: 2018-01-02

## 2018-01-02 LAB
ALBUMIN SERPL ELPH-MCNC: 4.2 G/DL
ALP BLD-CCNC: 60 U/L
ALT SERPL-CCNC: 22 U/L
ANION GAP SERPL CALC-SCNC: 15 MMOL/L
APPEARANCE: CLEAR
AST SERPL-CCNC: 21 U/L
BASOPHILS # BLD AUTO: 0.02 K/UL
BASOPHILS NFR BLD AUTO: 0.2 %
BILIRUB SERPL-MCNC: 0.2 MG/DL
BILIRUBIN URINE: NEGATIVE
BLOOD URINE: NEGATIVE
BUN SERPL-MCNC: 11 MG/DL
CALCIUM SERPL-MCNC: 9.8 MG/DL
CHLORIDE SERPL-SCNC: 100 MMOL/L
CO2 SERPL-SCNC: 22 MMOL/L
COLOR: YELLOW
CREAT SERPL-MCNC: 0.67 MG/DL
EOSINOPHIL # BLD AUTO: 0.08 K/UL
EOSINOPHIL NFR BLD AUTO: 0.8 %
GLUCOSE QUALITATIVE U: NEGATIVE MG/DL
GLUCOSE SERPL-MCNC: 75 MG/DL
HCT VFR BLD CALC: 37.4 %
HEMOGLOBIN: 12.6
HGB BLD-MCNC: 12.4 G/DL
IMM GRANULOCYTES NFR BLD AUTO: 0.3 %
KETONES URINE: NEGATIVE
LEUKOCYTE ESTERASE URINE: NEGATIVE
LYMPHOCYTES # BLD AUTO: 2.5 K/UL
LYMPHOCYTES NFR BLD AUTO: 25.7 %
MAN DIFF?: NORMAL
MCHC RBC-ENTMCNC: 27.8 PG
MCHC RBC-ENTMCNC: 33.2 GM/DL
MCV RBC AUTO: 83.9 FL
MONOCYTES # BLD AUTO: 0.61 K/UL
MONOCYTES NFR BLD AUTO: 6.3 %
NEUTROPHILS # BLD AUTO: 6.5 K/UL
NEUTROPHILS NFR BLD AUTO: 66.7 %
NITRITE URINE: NEGATIVE
PH URINE: 6
PLATELET # BLD AUTO: 288 K/UL
POTASSIUM SERPL-SCNC: 3.6 MMOL/L
PROT SERPL-MCNC: 7.8 G/DL
PROTEIN URINE: NEGATIVE MG/DL
RBC # BLD: 4.46 M/UL
RBC # FLD: 14.7 %
SODIUM SERPL-SCNC: 137 MMOL/L
SPECIFIC GRAVITY URINE: 1.01
UROBILINOGEN URINE: NEGATIVE MG/DL
WBC # FLD AUTO: 9.74 K/UL

## 2018-01-04 ENCOUNTER — APPOINTMENT (OUTPATIENT)
Dept: INTERNAL MEDICINE | Facility: CLINIC | Age: 33
End: 2018-01-04

## 2018-01-11 ENCOUNTER — APPOINTMENT (OUTPATIENT)
Dept: INTERNAL MEDICINE | Facility: CLINIC | Age: 33
End: 2018-01-11

## 2018-01-12 ENCOUNTER — APPOINTMENT (OUTPATIENT)
Dept: INTERNAL MEDICINE | Facility: CLINIC | Age: 33
End: 2018-01-12
Payer: COMMERCIAL

## 2018-01-12 VITALS — DIASTOLIC BLOOD PRESSURE: 70 MMHG | HEART RATE: 78 BPM | SYSTOLIC BLOOD PRESSURE: 120 MMHG | RESPIRATION RATE: 14 BRPM

## 2018-01-12 LAB — HEMOGLOBIN: 12.9

## 2018-01-12 PROCEDURE — 85018 HEMOGLOBIN: CPT | Mod: QW

## 2018-01-12 PROCEDURE — 99213 OFFICE O/P EST LOW 20 MIN: CPT | Mod: 25

## 2018-01-13 ENCOUNTER — APPOINTMENT (OUTPATIENT)
Dept: INTERNAL MEDICINE | Facility: CLINIC | Age: 33
End: 2018-01-13

## 2018-01-27 ENCOUNTER — APPOINTMENT (OUTPATIENT)
Dept: INTERNAL MEDICINE | Facility: CLINIC | Age: 33
End: 2018-01-27
Payer: COMMERCIAL

## 2018-01-27 VITALS — DIASTOLIC BLOOD PRESSURE: 70 MMHG | RESPIRATION RATE: 14 BRPM | HEART RATE: 72 BPM | SYSTOLIC BLOOD PRESSURE: 122 MMHG

## 2018-01-27 PROCEDURE — 99213 OFFICE O/P EST LOW 20 MIN: CPT

## 2018-02-07 ENCOUNTER — MEDICATION RENEWAL (OUTPATIENT)
Age: 33
End: 2018-02-07

## 2018-02-16 ENCOUNTER — MEDICATION RENEWAL (OUTPATIENT)
Age: 33
End: 2018-02-16

## 2018-03-22 ENCOUNTER — MEDICATION RENEWAL (OUTPATIENT)
Age: 33
End: 2018-03-22

## 2018-03-22 ENCOUNTER — CHART COPY (OUTPATIENT)
Age: 33
End: 2018-03-22

## 2018-03-27 ENCOUNTER — APPOINTMENT (OUTPATIENT)
Dept: INTERNAL MEDICINE | Facility: CLINIC | Age: 33
End: 2018-03-27
Payer: COMMERCIAL

## 2018-03-27 ENCOUNTER — APPOINTMENT (OUTPATIENT)
Dept: INTERNAL MEDICINE | Facility: CLINIC | Age: 33
End: 2018-03-27

## 2018-03-27 VITALS
TEMPERATURE: 99.4 F | SYSTOLIC BLOOD PRESSURE: 120 MMHG | DIASTOLIC BLOOD PRESSURE: 80 MMHG | HEART RATE: 72 BPM | RESPIRATION RATE: 16 BRPM

## 2018-03-27 LAB — S PYO AG SPEC QL IA: POSITIVE

## 2018-03-27 PROCEDURE — 87880 STREP A ASSAY W/OPTIC: CPT | Mod: QW

## 2018-03-27 PROCEDURE — 99213 OFFICE O/P EST LOW 20 MIN: CPT | Mod: 25

## 2018-03-27 RX ORDER — HYDROCODONE BITARTRATE AND ACETAMINOPHEN 5; 325 MG/1; MG/1
5-325 TABLET ORAL
Qty: 28 | Refills: 0 | Status: DISCONTINUED | COMMUNITY
Start: 2017-10-13

## 2018-04-04 ENCOUNTER — MEDICATION RENEWAL (OUTPATIENT)
Age: 33
End: 2018-04-04

## 2018-04-18 ENCOUNTER — APPOINTMENT (OUTPATIENT)
Dept: INTERNAL MEDICINE | Facility: CLINIC | Age: 33
End: 2018-04-18
Payer: COMMERCIAL

## 2018-04-18 VITALS
HEART RATE: 72 BPM | RESPIRATION RATE: 14 BRPM | SYSTOLIC BLOOD PRESSURE: 118 MMHG | TEMPERATURE: 99 F | DIASTOLIC BLOOD PRESSURE: 80 MMHG

## 2018-04-18 PROCEDURE — 99213 OFFICE O/P EST LOW 20 MIN: CPT

## 2018-04-28 ENCOUNTER — APPOINTMENT (OUTPATIENT)
Dept: INTERNAL MEDICINE | Facility: CLINIC | Age: 33
End: 2018-04-28
Payer: COMMERCIAL

## 2018-04-28 VITALS — HEART RATE: 72 BPM | RESPIRATION RATE: 14 BRPM | SYSTOLIC BLOOD PRESSURE: 110 MMHG | DIASTOLIC BLOOD PRESSURE: 80 MMHG

## 2018-04-28 PROCEDURE — 99213 OFFICE O/P EST LOW 20 MIN: CPT

## 2018-05-03 ENCOUNTER — APPOINTMENT (OUTPATIENT)
Dept: INTERNAL MEDICINE | Facility: CLINIC | Age: 33
End: 2018-05-03

## 2018-05-19 ENCOUNTER — RX RENEWAL (OUTPATIENT)
Age: 33
End: 2018-05-19

## 2018-06-06 ENCOUNTER — APPOINTMENT (OUTPATIENT)
Dept: INTERNAL MEDICINE | Facility: CLINIC | Age: 33
End: 2018-06-06
Payer: COMMERCIAL

## 2018-06-06 VITALS — HEART RATE: 72 BPM | DIASTOLIC BLOOD PRESSURE: 80 MMHG | RESPIRATION RATE: 14 BRPM | SYSTOLIC BLOOD PRESSURE: 110 MMHG

## 2018-06-06 PROCEDURE — 99213 OFFICE O/P EST LOW 20 MIN: CPT

## 2018-06-06 PROCEDURE — 99000 SPECIMEN HANDLING OFFICE-LAB: CPT

## 2018-06-06 NOTE — PHYSICAL EXAM
[No Acute Distress] : no acute distress [Supple] : supple [No Respiratory Distress] : no respiratory distress  [Clear to Auscultation] : lungs were clear to auscultation bilaterally [No Accessory Muscle Use] : no accessory muscle use [Normal Rate] : normal rate  [Regular Rhythm] : with a regular rhythm [Normal S1, S2] : normal S1 and S2 [No Edema] : there was no peripheral edema [Soft] : abdomen soft [Non Tender] : non-tender [Non-distended] : non-distended [No HSM] : no HSM [Normal Bowel Sounds] : normal bowel sounds [No Lymphadenopathy] : no lymphadenopathy [de-identified] : mild throat erythema

## 2018-06-06 NOTE — ASSESSMENT
[FreeTextEntry1] : omnicef bid for ten days\par F/U throat C+S\par \par F/U if symptoms do not resolve, or if they should change/worsen.\par

## 2018-06-06 NOTE — HISTORY OF PRESENT ILLNESS
[FreeTextEntry8] : Pt presents with sore throat x one day.\par No other URI symptoms\par No cough.\par Son diagnosed with strep throat Monday.\par

## 2018-06-08 ENCOUNTER — TRANSCRIPTION ENCOUNTER (OUTPATIENT)
Age: 33
End: 2018-06-08

## 2018-06-09 LAB — BACTERIA THROAT CULT: NORMAL

## 2018-06-20 ENCOUNTER — MEDICATION RENEWAL (OUTPATIENT)
Age: 33
End: 2018-06-20

## 2018-06-28 ENCOUNTER — APPOINTMENT (OUTPATIENT)
Dept: INTERNAL MEDICINE | Facility: CLINIC | Age: 33
End: 2018-06-28

## 2018-07-05 ENCOUNTER — APPOINTMENT (OUTPATIENT)
Dept: INTERNAL MEDICINE | Facility: CLINIC | Age: 33
End: 2018-07-05
Payer: COMMERCIAL

## 2018-07-05 PROCEDURE — 99213 OFFICE O/P EST LOW 20 MIN: CPT

## 2018-07-05 NOTE — PHYSICAL EXAM
[No Acute Distress] : no acute distress [No Respiratory Distress] : no respiratory distress  [Clear to Auscultation] : lungs were clear to auscultation bilaterally [No Accessory Muscle Use] : no accessory muscle use [Normal Rate] : normal rate  [Regular Rhythm] : with a regular rhythm [Normal S1, S2] : normal S1 and S2 [No Edema] : there was no peripheral edema [de-identified] : ?onchymycosis left thumbnail

## 2018-07-05 NOTE — HISTORY OF PRESENT ILLNESS
[FreeTextEntry8] : Left thumb with nail discoloration for over one year.\par No discharge\par No pain\par mild tenderness with nail manipulation Detail Level: Zone

## 2018-07-12 ENCOUNTER — APPOINTMENT (OUTPATIENT)
Dept: INTERNAL MEDICINE | Facility: CLINIC | Age: 33
End: 2018-07-12
Payer: COMMERCIAL

## 2018-07-12 PROCEDURE — 99213 OFFICE O/P EST LOW 20 MIN: CPT

## 2018-07-12 RX ORDER — CEFADROXIL 500 MG/1
500 CAPSULE ORAL TWICE DAILY
Qty: 14 | Refills: 0 | Status: COMPLETED | COMMUNITY
Start: 2018-07-12 | End: 2018-07-19

## 2018-07-12 RX ORDER — AMOXICILLIN AND CLAVULANATE POTASSIUM 875; 125 MG/1; MG/1
875-125 TABLET, COATED ORAL
Qty: 20 | Refills: 0 | Status: DISCONTINUED | COMMUNITY
Start: 2018-04-19 | End: 2018-07-12

## 2018-07-19 ENCOUNTER — RX RENEWAL (OUTPATIENT)
Age: 33
End: 2018-07-19

## 2018-07-21 ENCOUNTER — APPOINTMENT (OUTPATIENT)
Dept: INTERNAL MEDICINE | Facility: CLINIC | Age: 33
End: 2018-07-21
Payer: COMMERCIAL

## 2018-07-21 VITALS — RESPIRATION RATE: 16 BRPM | HEART RATE: 70 BPM | DIASTOLIC BLOOD PRESSURE: 80 MMHG | SYSTOLIC BLOOD PRESSURE: 120 MMHG

## 2018-07-21 PROBLEM — Z64.1 PROBLEMS RELATED TO MULTIPARITY: Chronic | Status: ACTIVE | Noted: 2017-02-08

## 2018-07-21 PROBLEM — G43.909 MIGRAINE, UNSPECIFIED, NOT INTRACTABLE, WITHOUT STATUS MIGRAINOSUS: Chronic | Status: ACTIVE | Noted: 2017-02-08

## 2018-07-21 PROBLEM — F41.9 ANXIETY DISORDER, UNSPECIFIED: Chronic | Status: ACTIVE | Noted: 2017-02-08

## 2018-07-21 PROBLEM — G47.00 INSOMNIA, UNSPECIFIED: Chronic | Status: ACTIVE | Noted: 2017-02-08

## 2018-07-21 PROCEDURE — 87880 STREP A ASSAY W/OPTIC: CPT | Mod: QW

## 2018-07-21 PROCEDURE — 99213 OFFICE O/P EST LOW 20 MIN: CPT | Mod: 25

## 2018-07-21 RX ORDER — CEFDINIR 300 MG/1
300 CAPSULE ORAL
Qty: 20 | Refills: 0 | Status: DISCONTINUED | COMMUNITY
Start: 2018-06-06 | End: 2018-07-21

## 2018-07-21 RX ORDER — CEPHALEXIN 500 MG/1
500 CAPSULE ORAL
Qty: 20 | Refills: 0 | Status: DISCONTINUED | COMMUNITY
Start: 2018-03-27 | End: 2018-07-21

## 2018-07-21 NOTE — HISTORY OF PRESENT ILLNESS
[FreeTextEntry1] : Son with strep\par Pt with sore throat this AM\par No fever\par No earaches. No cough

## 2018-07-21 NOTE — PHYSICAL EXAM
[No Acute Distress] : no acute distress [Well Nourished] : well nourished [Well Developed] : well developed [Well-Appearing] : well-appearing [Normal Sclera/Conjunctiva] : normal sclera/conjunctiva [Normal Oropharynx] : the oropharynx was normal [Normal TMs] : both tympanic membranes were normal [No JVD] : no jugular venous distention [Supple] : supple [No Lymphadenopathy] : no lymphadenopathy [No Respiratory Distress] : no respiratory distress  [Clear to Auscultation] : lungs were clear to auscultation bilaterally [No Accessory Muscle Use] : no accessory muscle use [Normal Rate] : normal rate  [Regular Rhythm] : with a regular rhythm [Normal S1, S2] : normal S1 and S2 [No Murmur] : no murmur heard [Soft] : abdomen soft [Non Tender] : non-tender [No HSM] : no HSM [Normal Supraclavicular Nodes] : no supraclavicular lymphadenopathy [Normal Axillary Nodes] : no axillary lymphadenopathy [Normal Anterior Cervical Nodes] : no anterior cervical lymphadenopathy

## 2018-07-21 NOTE — ASSESSMENT
[FreeTextEntry1] : Pt with pharyngitis - rapid strep negative\par Will send TC\par Salt water gargles.

## 2018-07-24 LAB — BACTERIA THROAT CULT: NORMAL

## 2018-08-13 ENCOUNTER — MEDICATION RENEWAL (OUTPATIENT)
Age: 33
End: 2018-08-13

## 2018-08-15 ENCOUNTER — MEDICATION RENEWAL (OUTPATIENT)
Age: 33
End: 2018-08-15

## 2018-08-17 ENCOUNTER — LABORATORY RESULT (OUTPATIENT)
Age: 33
End: 2018-08-17

## 2018-08-17 ENCOUNTER — APPOINTMENT (OUTPATIENT)
Dept: INTERNAL MEDICINE | Facility: CLINIC | Age: 33
End: 2018-08-17
Payer: COMMERCIAL

## 2018-08-17 VITALS — RESPIRATION RATE: 14 BRPM | DIASTOLIC BLOOD PRESSURE: 80 MMHG | HEART RATE: 90 BPM | SYSTOLIC BLOOD PRESSURE: 146 MMHG

## 2018-08-17 VITALS — TEMPERATURE: 98.9 F

## 2018-08-17 LAB
BILIRUB UR QL STRIP: NEGATIVE
CLARITY UR: CLEAR
COLLECTION METHOD: NORMAL
GLUCOSE UR-MCNC: NEGATIVE
HCG UR QL: 0.2 EU/DL
HGB UR QL STRIP.AUTO: ABNORMAL
KETONES UR-MCNC: NEGATIVE
LEUKOCYTE ESTERASE UR QL STRIP: NEGATIVE
NITRITE UR QL STRIP: NEGATIVE
PH UR STRIP: 8.5
PROT UR STRIP-MCNC: NEGATIVE
SP GR UR STRIP: 1.02

## 2018-08-17 PROCEDURE — 99214 OFFICE O/P EST MOD 30 MIN: CPT | Mod: 25

## 2018-08-17 PROCEDURE — 36415 COLL VENOUS BLD VENIPUNCTURE: CPT

## 2018-08-17 PROCEDURE — 81002 URINALYSIS NONAUTO W/O SCOPE: CPT

## 2018-08-17 NOTE — PHYSICAL EXAM
[Normal Oropharynx] : the oropharynx was normal [Supple] : supple [No Respiratory Distress] : no respiratory distress  [Clear to Auscultation] : lungs were clear to auscultation bilaterally [No Accessory Muscle Use] : no accessory muscle use [Normal Rate] : normal rate  [Regular Rhythm] : with a regular rhythm [Normal S1, S2] : normal S1 and S2 [No Edema] : there was no peripheral edema [Soft] : abdomen soft [Normal Bowel Sounds] : normal bowel sounds [de-identified] : very emotional/crying [de-identified] : LLQ pain > RLQ pain with palpation, no rebound

## 2018-08-17 NOTE — ASSESSMENT
[FreeTextEntry1] : Blood work was drawn and sent to the lab today. The patient has been instructed to call the office next week to discuss today's lab work.\par \par Pt with long history of diverticulitis\par Doubt acute diverticulitis in light of emotional stressors - likely panic attacks/anxiety.\par Begin Cipro pending blood work.\par \par Restart duloxetine daily\par Change xanax to klonopin bid PRN\par Istop checked\par \par F/U 3 weeks, sooner if abdominal pain continues / elevated WBC\par

## 2018-08-17 NOTE — HISTORY OF PRESENT ILLNESS
[FreeTextEntry8] : Pt presents for evaluation.\par Pt with LLQ pain for about one day. No fever.\par Lots of stress at home - pts son's father being deported in a few weeks. Just heard the final news this morning. Immediately had worsening abdominal pain/chills.\par Feels tingling in hands/feet.\par Very emotional.\par

## 2018-08-18 ENCOUNTER — RX RENEWAL (OUTPATIENT)
Age: 33
End: 2018-08-18

## 2018-08-21 ENCOUNTER — EMERGENCY (EMERGENCY)
Facility: HOSPITAL | Age: 33
LOS: 1 days | Discharge: HOME | End: 2018-08-21
Attending: EMERGENCY MEDICINE
Payer: COMMERCIAL

## 2018-08-21 ENCOUNTER — CLINICAL ADVICE (OUTPATIENT)
Age: 33
End: 2018-08-21

## 2018-08-21 VITALS
DIASTOLIC BLOOD PRESSURE: 72 MMHG | OXYGEN SATURATION: 98 % | TEMPERATURE: 98 F | SYSTOLIC BLOOD PRESSURE: 108 MMHG | HEART RATE: 72 BPM | RESPIRATION RATE: 17 BRPM

## 2018-08-21 VITALS
TEMPERATURE: 99 F | SYSTOLIC BLOOD PRESSURE: 121 MMHG | HEART RATE: 89 BPM | OXYGEN SATURATION: 97 % | DIASTOLIC BLOOD PRESSURE: 84 MMHG | WEIGHT: 195.11 LBS | RESPIRATION RATE: 17 BRPM

## 2018-08-21 LAB
ALBUMIN SERPL ELPH-MCNC: 4.1 G/DL — SIGNIFICANT CHANGE UP (ref 3.3–5)
ALP SERPL-CCNC: 60 U/L — SIGNIFICANT CHANGE UP (ref 40–120)
ALT FLD-CCNC: 11 U/L — SIGNIFICANT CHANGE UP (ref 10–45)
ANION GAP SERPL CALC-SCNC: 12 MMOL/L — SIGNIFICANT CHANGE UP (ref 5–17)
APPEARANCE UR: ABNORMAL
AST SERPL-CCNC: 18 U/L — SIGNIFICANT CHANGE UP (ref 10–40)
BACTERIA # UR AUTO: ABNORMAL /HPF
BASOPHILS # BLD AUTO: 0 K/UL — SIGNIFICANT CHANGE UP (ref 0–0.2)
BASOPHILS NFR BLD AUTO: 0.4 % — SIGNIFICANT CHANGE UP (ref 0–2)
BILIRUB SERPL-MCNC: 0.2 MG/DL — SIGNIFICANT CHANGE UP (ref 0.2–1.2)
BILIRUB UR-MCNC: NEGATIVE — SIGNIFICANT CHANGE UP
BUN SERPL-MCNC: 11 MG/DL — SIGNIFICANT CHANGE UP (ref 7–23)
CALCIUM SERPL-MCNC: 9.3 MG/DL — SIGNIFICANT CHANGE UP (ref 8.4–10.5)
CHLORIDE SERPL-SCNC: 104 MMOL/L — SIGNIFICANT CHANGE UP (ref 96–108)
CO2 SERPL-SCNC: 21 MMOL/L — LOW (ref 22–31)
COLOR SPEC: YELLOW — SIGNIFICANT CHANGE UP
CREAT SERPL-MCNC: 0.64 MG/DL — SIGNIFICANT CHANGE UP (ref 0.5–1.3)
DIFF PNL FLD: ABNORMAL
EOSINOPHIL # BLD AUTO: 0.3 K/UL — SIGNIFICANT CHANGE UP (ref 0–0.5)
EOSINOPHIL NFR BLD AUTO: 3.2 % — SIGNIFICANT CHANGE UP (ref 0–6)
EPI CELLS # UR: SIGNIFICANT CHANGE UP /HPF
GAS PNL BLDV: SIGNIFICANT CHANGE UP
GLUCOSE SERPL-MCNC: 97 MG/DL — SIGNIFICANT CHANGE UP (ref 70–99)
GLUCOSE UR QL: NEGATIVE — SIGNIFICANT CHANGE UP
HCT VFR BLD CALC: 32.1 % — LOW (ref 34.5–45)
HGB BLD-MCNC: 10.9 G/DL — LOW (ref 11.5–15.5)
INR BLD: 1.32 RATIO — HIGH (ref 0.88–1.16)
KETONES UR-MCNC: NEGATIVE — SIGNIFICANT CHANGE UP
LEUKOCYTE ESTERASE UR-ACNC: ABNORMAL
LIDOCAIN IGE QN: 36 U/L — SIGNIFICANT CHANGE UP (ref 7–60)
LYMPHOCYTES # BLD AUTO: 1.6 K/UL — SIGNIFICANT CHANGE UP (ref 1–3.3)
LYMPHOCYTES # BLD AUTO: 19.4 % — SIGNIFICANT CHANGE UP (ref 13–44)
MCHC RBC-ENTMCNC: 27.2 PG — SIGNIFICANT CHANGE UP (ref 27–34)
MCHC RBC-ENTMCNC: 34 GM/DL — SIGNIFICANT CHANGE UP (ref 32–36)
MCV RBC AUTO: 79.8 FL — LOW (ref 80–100)
MONOCYTES # BLD AUTO: 0.6 K/UL — SIGNIFICANT CHANGE UP (ref 0–0.9)
MONOCYTES NFR BLD AUTO: 6.6 % — SIGNIFICANT CHANGE UP (ref 2–14)
NEUTROPHILS # BLD AUTO: 6 K/UL — SIGNIFICANT CHANGE UP (ref 1.8–7.4)
NEUTROPHILS NFR BLD AUTO: 70.4 % — SIGNIFICANT CHANGE UP (ref 43–77)
NITRITE UR-MCNC: NEGATIVE — SIGNIFICANT CHANGE UP
PH UR: 7.5 — SIGNIFICANT CHANGE UP (ref 5–8)
PLATELET # BLD AUTO: 332 K/UL — SIGNIFICANT CHANGE UP (ref 150–400)
POTASSIUM SERPL-MCNC: 3.7 MMOL/L — SIGNIFICANT CHANGE UP (ref 3.5–5.3)
POTASSIUM SERPL-SCNC: 3.7 MMOL/L — SIGNIFICANT CHANGE UP (ref 3.5–5.3)
PROT SERPL-MCNC: 7.9 G/DL — SIGNIFICANT CHANGE UP (ref 6–8.3)
PROT UR-MCNC: SIGNIFICANT CHANGE UP
PROTHROM AB SERPL-ACNC: 14.3 SEC — HIGH (ref 9.8–12.7)
RBC # BLD: 4.03 M/UL — SIGNIFICANT CHANGE UP (ref 3.8–5.2)
RBC # FLD: 12.5 % — SIGNIFICANT CHANGE UP (ref 10.3–14.5)
RBC CASTS # UR COMP ASSIST: SIGNIFICANT CHANGE UP /HPF (ref 0–2)
SODIUM SERPL-SCNC: 137 MMOL/L — SIGNIFICANT CHANGE UP (ref 135–145)
SP GR SPEC: 1.02 — SIGNIFICANT CHANGE UP (ref 1.01–1.02)
UROBILINOGEN FLD QL: NEGATIVE — SIGNIFICANT CHANGE UP
WBC # BLD: 8.5 K/UL — SIGNIFICANT CHANGE UP (ref 3.8–10.5)
WBC # FLD AUTO: 8.5 K/UL — SIGNIFICANT CHANGE UP (ref 3.8–10.5)
WBC UR QL: SIGNIFICANT CHANGE UP /HPF (ref 0–5)

## 2018-08-21 PROCEDURE — 85610 PROTHROMBIN TIME: CPT

## 2018-08-21 PROCEDURE — 99284 EMERGENCY DEPT VISIT MOD MDM: CPT | Mod: 25

## 2018-08-21 PROCEDURE — 74177 CT ABD & PELVIS W/CONTRAST: CPT | Mod: 26

## 2018-08-21 PROCEDURE — 82947 ASSAY GLUCOSE BLOOD QUANT: CPT

## 2018-08-21 PROCEDURE — 96376 TX/PRO/DX INJ SAME DRUG ADON: CPT

## 2018-08-21 PROCEDURE — 85014 HEMATOCRIT: CPT

## 2018-08-21 PROCEDURE — 83690 ASSAY OF LIPASE: CPT

## 2018-08-21 PROCEDURE — 80053 COMPREHEN METABOLIC PANEL: CPT

## 2018-08-21 PROCEDURE — 96375 TX/PRO/DX INJ NEW DRUG ADDON: CPT

## 2018-08-21 PROCEDURE — 82565 ASSAY OF CREATININE: CPT

## 2018-08-21 PROCEDURE — 99220: CPT

## 2018-08-21 PROCEDURE — 96365 THER/PROPH/DIAG IV INF INIT: CPT | Mod: XU

## 2018-08-21 PROCEDURE — G0378: CPT

## 2018-08-21 PROCEDURE — 81001 URINALYSIS AUTO W/SCOPE: CPT

## 2018-08-21 PROCEDURE — 87086 URINE CULTURE/COLONY COUNT: CPT

## 2018-08-21 PROCEDURE — 85027 COMPLETE CBC AUTOMATED: CPT

## 2018-08-21 PROCEDURE — 82803 BLOOD GASES ANY COMBINATION: CPT

## 2018-08-21 PROCEDURE — 84295 ASSAY OF SERUM SODIUM: CPT

## 2018-08-21 PROCEDURE — 84132 ASSAY OF SERUM POTASSIUM: CPT

## 2018-08-21 PROCEDURE — 83605 ASSAY OF LACTIC ACID: CPT

## 2018-08-21 PROCEDURE — 74177 CT ABD & PELVIS W/CONTRAST: CPT

## 2018-08-21 PROCEDURE — 82435 ASSAY OF BLOOD CHLORIDE: CPT

## 2018-08-21 PROCEDURE — 82330 ASSAY OF CALCIUM: CPT

## 2018-08-21 RX ORDER — ALPRAZOLAM 0.25 MG
1 TABLET ORAL
Qty: 0 | Refills: 0 | COMMUNITY

## 2018-08-21 RX ORDER — MORPHINE SULFATE 50 MG/1
4 CAPSULE, EXTENDED RELEASE ORAL EVERY 6 HOURS
Qty: 0 | Refills: 0 | Status: DISCONTINUED | OUTPATIENT
Start: 2018-08-21 | End: 2018-08-21

## 2018-08-21 RX ORDER — ONDANSETRON 8 MG/1
4 TABLET, FILM COATED ORAL ONCE
Qty: 0 | Refills: 0 | Status: COMPLETED | OUTPATIENT
Start: 2018-08-21 | End: 2018-08-21

## 2018-08-21 RX ORDER — IBUPROFEN 200 MG
3 TABLET ORAL
Qty: 0 | Refills: 0 | COMMUNITY

## 2018-08-21 RX ORDER — MORPHINE SULFATE 50 MG/1
4 CAPSULE, EXTENDED RELEASE ORAL ONCE
Qty: 0 | Refills: 0 | Status: DISCONTINUED | OUTPATIENT
Start: 2018-08-21 | End: 2018-08-21

## 2018-08-21 RX ORDER — AMPICILLIN SODIUM AND SULBACTAM SODIUM 250; 125 MG/ML; MG/ML
3 INJECTION, POWDER, FOR SUSPENSION INTRAMUSCULAR; INTRAVENOUS EVERY 6 HOURS
Qty: 0 | Refills: 0 | Status: DISCONTINUED | OUTPATIENT
Start: 2018-08-21 | End: 2018-08-25

## 2018-08-21 RX ORDER — ONDANSETRON 8 MG/1
4 TABLET, FILM COATED ORAL EVERY 6 HOURS
Qty: 0 | Refills: 0 | Status: DISCONTINUED | OUTPATIENT
Start: 2018-08-21 | End: 2018-08-25

## 2018-08-21 RX ORDER — SUMATRIPTAN SUCCINATE 4 MG/.5ML
1 INJECTION, SOLUTION SUBCUTANEOUS
Qty: 0 | Refills: 0 | COMMUNITY

## 2018-08-21 RX ORDER — SODIUM CHLORIDE 9 MG/ML
1000 INJECTION INTRAMUSCULAR; INTRAVENOUS; SUBCUTANEOUS ONCE
Qty: 0 | Refills: 0 | Status: COMPLETED | OUTPATIENT
Start: 2018-08-21 | End: 2018-08-21

## 2018-08-21 RX ADMIN — MORPHINE SULFATE 4 MILLIGRAM(S): 50 CAPSULE, EXTENDED RELEASE ORAL at 11:50

## 2018-08-21 RX ADMIN — ONDANSETRON 4 MILLIGRAM(S): 8 TABLET, FILM COATED ORAL at 11:20

## 2018-08-21 RX ADMIN — SODIUM CHLORIDE 2000 MILLILITER(S): 9 INJECTION INTRAMUSCULAR; INTRAVENOUS; SUBCUTANEOUS at 11:21

## 2018-08-21 RX ADMIN — AMPICILLIN SODIUM AND SULBACTAM SODIUM 3 GRAM(S): 250; 125 INJECTION, POWDER, FOR SUSPENSION INTRAMUSCULAR; INTRAVENOUS at 11:50

## 2018-08-21 RX ADMIN — MORPHINE SULFATE 4 MILLIGRAM(S): 50 CAPSULE, EXTENDED RELEASE ORAL at 11:20

## 2018-08-21 RX ADMIN — SODIUM CHLORIDE 1000 MILLILITER(S): 9 INJECTION INTRAMUSCULAR; INTRAVENOUS; SUBCUTANEOUS at 11:50

## 2018-08-21 RX ADMIN — AMPICILLIN SODIUM AND SULBACTAM SODIUM 200 GRAM(S): 250; 125 INJECTION, POWDER, FOR SUSPENSION INTRAMUSCULAR; INTRAVENOUS at 17:53

## 2018-08-21 RX ADMIN — AMPICILLIN SODIUM AND SULBACTAM SODIUM 200 GRAM(S): 250; 125 INJECTION, POWDER, FOR SUSPENSION INTRAMUSCULAR; INTRAVENOUS at 11:20

## 2018-08-21 NOTE — ED ADULT NURSE NOTE - CHPI ED NUR SYMPTOMS NEG
no blood in stool/no burning urination/no chills/no dysuria/no abdominal distension/no fever/no hematuria

## 2018-08-21 NOTE — ED PROVIDER NOTE - CARE PLAN
Principal Discharge DX:	Diverticulitis of large intestine without perforation or abscess with bleeding

## 2018-08-21 NOTE — ED CDU PROVIDER DISPOSITION NOTE - ATTENDING CONTRIBUTION TO CARE
Cindi Cisneros MD - Attending Physician: CDU DISCHARGE NOTE - Dr. Cisneros Attending : Patient is stable for discharge to home.  Labs and tests reviewed with the patient.  The patient is to follow up with their doctor as discussed. Exam wnl, pain resolved.     I have personally seen and examined this patient. I have discussed the case with the ACP. I have reviewed all pertinent clinical information, including history, physical exam, plan and the ACP’s note and agree except as noted.

## 2018-08-21 NOTE — ED ADULT NURSE REASSESSMENT NOTE - NS ED NURSE REASSESS COMMENT FT1
Pt report received from RN. Pt transferred to cdu bed 8 for abdominal monitor. Pt a/ox3 denies respiratory distress, sob, dyspnea, C/P, palpitations, n/v/d at this time. pt states symptoms currently improved .VSS, afebrile, IV clean/dry/intact. Pt aware of plan of care, call bell within reach ,safety maintained. Will monitor and assess.

## 2018-08-21 NOTE — ED PROVIDER NOTE - OBJECTIVE STATEMENT
33 year old with a history of diverticulitis presents to the ED with complaints of worsening left sided abdominal pain since last Friday. The Pt went to see her Urologist was prescribed medication with no relief. Onset unknown. No aggravating or exacerbating factors noted. She has eight diverticulitis flare up since initial diagnosis and states that symptoms today are worse than past flare ups. C/o of vomiting, and diarrhea. Diarrhea is non bloody. Endorses chills. No fevers. History of . Per Patient most recent imaging was beginning of this year and underwent colonoscopy with removal of five polyps per patient. 33 year old with a history of diverticulitis presents to the ED with complaints of worsening left sided abdominal pain since last Friday. The Pt went to see her Urologist and was prescribed medication to take over the weekend. She reports no relief. Onset unknown. No aggravating or exacerbating factors noted. She has eight diverticulitis flare up since initial diagnosis and states that symptoms today are worse than past flare ups and is here for further evaluation. C/o of vomiting, and diarrhea. Diarrhea is non bloody. Endorses chills. No fevers. History of . Per Patient most recent imaging was beginning of this year and underwent colonoscopy with removal of five polyps per patient. 33 year old female with a history of diverticulitis presents to the ED with complaints of worsening left sided abdominal pain since last Friday. Symptoms are similar in nature to past diverticulitis flares up, however patient states symptoms today are more severe in nature and is here for further evaluation. The Pt went to see her Urologist and was prescribed medication to take over the weekend. She reports no relief. No aggravating or exacerbating factors noted. C/o of vomiting, and diarrhea. Diarrhea is non bloody. Endorses chills. No fevers. History of . Per Patient most recent imaging was in the beginning of this year and has underwent previous colonoscopy with removal of five polyps . 33 year old female with a history of diverticulitis presents to the ED with complaints of worsening left sided abdominal pain since last Friday. Symptoms are similar in nature to past diverticulitis flares up, however patient states symptoms today are more severe and is here for further evaluation. The Pt went to see her Urologist and was prescribed medication to take over the weekend. She reports no relief. No aggravating or exacerbating factors noted. C/o of vomiting, and diarrhea. Diarrhea is non bloody. Endorses chills. No fevers. History of . Per Patient most recent imaging was in the beginning of this year and has underwent previous colonoscopy with removal of five polyps . 33 year old female with a history of diverticulitis presents to the ED with complaints of worsening left sided abdominal pain since last Friday. Symptoms are similar in nature to past diverticulitis flares up, however patient states symptoms today are more severe and is here for further evaluation. The Pt went to see her Urologist and was prescribed medication to take over the weekend. She reports no relief. No aggravating or exacerbating factors noted. C/o of vomiting, and diarrhea. Diarrhea is non bloody. Endorses chills. No fevers. History of . Per Patient most recent imaging of her stomach was in the beginning of this year and has underwent previous colonoscopy in the past.

## 2018-08-21 NOTE — ED ADULT NURSE NOTE - NSIMPLEMENTINTERV_GEN_ALL_ED
Implemented All Universal Safety Interventions:  [place] to call system. Call bell, personal items and telephone within reach. Instruct patient to call for assistance. Room bathroom lighting operational. Non-slip footwear when patient is off stretcher. Physically safe environment: no spills, clutter or unnecessary equipment. Stretcher in lowest position, wheels locked, appropriate side rails in place.

## 2018-08-21 NOTE — ED CDU PROVIDER DISPOSITION NOTE - PLAN OF CARE
1. Stay hydrated. Rest. [place] diet.   2. Continue Current Home Medications per routine.  Stop Cipro and flagyl. Start Augmentin 1 tab 2x/day for 10 days.   3. Follow up with your PCP Dr. Flores in 1-2 days. Bring Printed Results.  4. Return if symptoms worsen, fever, weakness, inability to eat/drink, dizziness and all other concerns.

## 2018-08-21 NOTE — ED ADULT NURSE NOTE - OBJECTIVE STATEMENT
34 y/o female hx diverticulitis presents to the ED c/o LLQ abd pain x 4 days. Pt states she was diagnosed with diverticulitis x 8 times, started cipro/flagyl PO treatment x 3 days go, states pain is worsening. Endorsing n/v/d. Denies fever, chills, urinary s/s, NBNB vomit. Pt A&Ox3, in no respiratory distress, no CP, abdomen, soft, tender to palpitation in LLQ, nondistended, resting comfortably with safety maintained and family at bedside.

## 2018-08-21 NOTE — ED PROVIDER NOTE - PROGRESS NOTE DETAILS
Updated pt on image findings and lab results. Patient is having worsening pain despite IV AB, fluids and pain control. At this time I would recommend further observation and continued treatment of IV AB to determine whether the patient can be safely discharged. Discussed possible admission if improvement is not obtained. Patient is in agreement with plan of care. All questions and concerns addressed.

## 2018-08-21 NOTE — ED CDU PROVIDER DISPOSITION NOTE - CLINICAL COURSE
· HPI Objective Statement: 33 year old female with a history of diverticulitis presents to the ED with complaints of worsening left sided abdominal pain x 4 days. Symptoms are similar in nature to past diverticulitis flares up, however patient states symptoms today are more severe and was sent her by her PCP for evaluation. pt has been on cipro (since fri) and flagyl (since saturday) by her PCP. Pt also C/o of non-bloody vomiting, and 4 episodes of diarrhea today. Diarrhea is non bloody. No fevers. History of . pt reports improvement of abdominal pain to 3/10 from 8/10.  IN ED- CT abdomen- acute uncomplicated sigmoid diverticulitis. pt sent to CDU for observation including IV abx and pain control. · HPI Objective Statement: 33 year old female with a history of diverticulitis presents to the ED with complaints of worsening left sided abdominal pain x 4 days. Symptoms are similar in nature to past diverticulitis flares up, however patient states symptoms today are more severe and was sent her by her PCP for evaluation. pt has been on cipro (since fri) and flagyl (since saturday) by her PCP. Pt also C/o of non-bloody vomiting, and 4 episodes of diarrhea today. Diarrhea is non bloody. No fevers. History of . pt reports improvement of abdominal pain to 3/10 from 8/10.  IN ED- CT abdomen- acute uncomplicated sigmoid diverticulitis. pt sent to CDU for observation including IV abx and pain control. pain improved, feels well wants to go home. ate without issue. as per Dr. Simons, pt stable for d/c.

## 2018-08-21 NOTE — ED CDU PROVIDER INITIAL DAY NOTE - PROGRESS NOTE DETAILS
CDU NOTE JULITA Ledezma: spoke with Dr. Christina martines with plan. CDU NOTE JULITA Ledezma: pt resting comfortably, feels much better. tolerating water without n/v/d. pt reports no pain. NAD recent VSS. pt to have next dose abx soon and dinner and reevaluate. CDU NOTE JULITA Ledezma: pt resting comfortably, feels well without complaint. ate without issue. NAD recent VSS. as per Dr. Simons pt stable for d/c.

## 2018-08-21 NOTE — ED PROVIDER NOTE - MEDICAL DECISION MAKING DETAILS
Dr. Franz Note: recurrent diverticulitis with failure of outpt antibx, consider cdu vs admission for iv antibx

## 2018-08-21 NOTE — ED POST DISCHARGE NOTE - REASON FOR FOLLOW-UP
Other pt states script for antibiotic was sent to the wrong pharmacy, wants it sent to [facility] in [facility]. reviewed chart. sent script to [facility]. Ranjan Magana

## 2018-08-21 NOTE — ED CDU PROVIDER INITIAL DAY NOTE - OBJECTIVE STATEMENT
33 year old female with a history of diverticulitis presents to the ED with complaints of worsening left sided abdominal pain since last Friday. Symptoms are similar in nature to past diverticulitis flares up, however patient states symptoms today are more severe and is here for further evaluation. The Pt went to see her Urologist and was prescribed medication to take over the weekend. She reports no relief. No aggravating or exacerbating factors noted. C/o of vomiting, and diarrhea. Diarrhea is non bloody. Endorses chills. No fevers. History of . Per Patient most recent imaging of her stomach was in the beginning of this year and has underwent previous colonoscopy in the past. 33 year old female with a history of diverticulitis presents to the ED with complaints of worsening left sided abdominal pain x 4 days. Symptoms are similar in nature to past diverticulitis flares up, however patient states symptoms today are more severe and was sent her by her PCP for evaluation. pt has been on cipro (since fri) and flagyl (since saturday) by her PCP. Pt also C/o of non-bloody vomiting, and 4 episodes of diarrhea today. Diarrhea is non bloody. No fevers. History of . pt reports improvement of abdominal pain to 3/10 from 8/10.

## 2018-08-22 LAB
CULTURE RESULTS: NO GROWTH — SIGNIFICANT CHANGE UP
SPECIMEN SOURCE: SIGNIFICANT CHANGE UP

## 2018-08-23 ENCOUNTER — APPOINTMENT (OUTPATIENT)
Dept: INTERNAL MEDICINE | Facility: CLINIC | Age: 33
End: 2018-08-23
Payer: COMMERCIAL

## 2018-08-23 VITALS — DIASTOLIC BLOOD PRESSURE: 80 MMHG | HEART RATE: 72 BPM | SYSTOLIC BLOOD PRESSURE: 130 MMHG | RESPIRATION RATE: 16 BRPM

## 2018-08-23 PROCEDURE — 36415 COLL VENOUS BLD VENIPUNCTURE: CPT

## 2018-08-23 PROCEDURE — 99214 OFFICE O/P EST MOD 30 MIN: CPT | Mod: 25

## 2018-08-23 RX ORDER — CIPROFLOXACIN HYDROCHLORIDE 500 MG/1
500 TABLET, FILM COATED ORAL TWICE DAILY
Qty: 20 | Refills: 0 | Status: DISCONTINUED | COMMUNITY
Start: 2018-08-17 | End: 2018-08-23

## 2018-08-23 RX ORDER — METRONIDAZOLE 500 MG/1
500 TABLET ORAL 3 TIMES DAILY
Qty: 30 | Refills: 0 | Status: DISCONTINUED | COMMUNITY
Start: 2018-08-18 | End: 2018-08-23

## 2018-08-23 NOTE — PHYSICAL EXAM
[No Acute Distress] : no acute distress [Well Nourished] : well nourished [Well Developed] : well developed [Well-Appearing] : well-appearing [Normal Sclera/Conjunctiva] : normal sclera/conjunctiva [Normal Oropharynx] : the oropharynx was normal [No Respiratory Distress] : no respiratory distress  [Clear to Auscultation] : lungs were clear to auscultation bilaterally [No Accessory Muscle Use] : no accessory muscle use [Normal Rate] : normal rate  [Regular Rhythm] : with a regular rhythm [Normal S1, S2] : normal S1 and S2 [No Murmur] : no murmur heard [No Edema] : there was no peripheral edema [Soft] : abdomen soft [No HSM] : no HSM [Normal Supraclavicular Nodes] : no supraclavicular lymphadenopathy [Normal Posterior Cervical Nodes] : no posterior cervical lymphadenopathy [Normal Anterior Cervical Nodes] : no anterior cervical lymphadenopathy [No Focal Deficits] : no focal deficits [Alert and Oriented x3] : oriented to person, place, and time [de-identified] : mildly tender in LLQ

## 2018-08-23 NOTE — HISTORY OF PRESENT ILLNESS
[FreeTextEntry1] : FU for diverticulitis - in ER  -CTT shows diverticulitis\par WBC came down\par No fever\par On liquid diet.

## 2018-08-23 NOTE — ASSESSMENT
[FreeTextEntry1] : Pt with improving diverticulitis\par To continue Augmentin\par Can advance to a low residue diet\par Once better  -favor Metamucil every day and should consider surgical resection of area as pt has had multiple episodes of diverticulitis.\par Seeing GI next week\par Repeat CBC and iron studies.

## 2018-08-24 LAB
BASOPHILS # BLD AUTO: 0.02 K/UL
BASOPHILS NFR BLD AUTO: 0.3 %
EOSINOPHIL # BLD AUTO: 0.15 K/UL
EOSINOPHIL NFR BLD AUTO: 1.9 %
HCT VFR BLD CALC: 36.7 %
HGB BLD-MCNC: 11.8 G/DL
IMM GRANULOCYTES NFR BLD AUTO: 0.3 %
IRON SATN MFR SERPL: 12 %
IRON SERPL-MCNC: 43 UG/DL
LYMPHOCYTES # BLD AUTO: 1.85 K/UL
LYMPHOCYTES NFR BLD AUTO: 23.7 %
MAN DIFF?: NORMAL
MCHC RBC-ENTMCNC: 26.4 PG
MCHC RBC-ENTMCNC: 32.2 GM/DL
MCV RBC AUTO: 82.1 FL
MONOCYTES # BLD AUTO: 0.35 K/UL
MONOCYTES NFR BLD AUTO: 4.5 %
NEUTROPHILS # BLD AUTO: 5.43 K/UL
NEUTROPHILS NFR BLD AUTO: 69.3 %
PLATELET # BLD AUTO: 410 K/UL
RBC # BLD: 4.47 M/UL
RBC # FLD: 14.2 %
TIBC SERPL-MCNC: 345 UG/DL
UIBC SERPL-MCNC: 302 UG/DL
WBC # FLD AUTO: 7.82 K/UL

## 2018-08-27 LAB
ALBUMIN SERPL ELPH-MCNC: 4.6 G/DL
ALP BLD-CCNC: 69 U/L
ALT SERPL-CCNC: 12 U/L
ANION GAP SERPL CALC-SCNC: 16 MMOL/L
AST SERPL-CCNC: 21 U/L
BASOPHILS # BLD AUTO: 0.02 K/UL
BASOPHILS NFR BLD AUTO: 0.1 %
BILIRUB SERPL-MCNC: 0.2 MG/DL
BUN SERPL-MCNC: 14 MG/DL
CALCIUM SERPL-MCNC: 9.5 MG/DL
CHLORIDE SERPL-SCNC: 102 MMOL/L
CO2 SERPL-SCNC: 23 MMOL/L
CREAT SERPL-MCNC: 0.51 MG/DL
EOSINOPHIL # BLD AUTO: 0.13 K/UL
EOSINOPHIL NFR BLD AUTO: 0.8 %
GLUCOSE SERPL-MCNC: 81 MG/DL
HCT VFR BLD CALC: 37.2 %
HGB BLD-MCNC: 11.9 G/DL
IMM GRANULOCYTES NFR BLD AUTO: 0.1 %
LYMPHOCYTES # BLD AUTO: 2.37 K/UL
LYMPHOCYTES NFR BLD AUTO: 14.2 %
MAN DIFF?: NORMAL
MCHC RBC-ENTMCNC: 26 PG
MCHC RBC-ENTMCNC: 32 GM/DL
MCV RBC AUTO: 81.4 FL
MONOCYTES # BLD AUTO: 1.06 K/UL
MONOCYTES NFR BLD AUTO: 6.4 %
NEUTROPHILS # BLD AUTO: 13.05 K/UL
NEUTROPHILS NFR BLD AUTO: 78.4 %
PLATELET # BLD AUTO: 318 K/UL
POTASSIUM SERPL-SCNC: 3.8 MMOL/L
PROT SERPL-MCNC: 7.7 G/DL
RBC # BLD: 4.57 M/UL
RBC # FLD: 15.2 %
SODIUM SERPL-SCNC: 141 MMOL/L
WBC # FLD AUTO: 16.65 K/UL

## 2018-09-01 ENCOUNTER — APPOINTMENT (OUTPATIENT)
Dept: INTERNAL MEDICINE | Facility: CLINIC | Age: 33
End: 2018-09-01
Payer: COMMERCIAL

## 2018-09-01 PROCEDURE — 36415 COLL VENOUS BLD VENIPUNCTURE: CPT

## 2018-09-03 LAB
BASOPHILS # BLD AUTO: 0.03 K/UL
BASOPHILS NFR BLD AUTO: 0.4 %
EOSINOPHIL # BLD AUTO: 0.18 K/UL
EOSINOPHIL NFR BLD AUTO: 2.3 %
HCT VFR BLD CALC: 38.6 %
HGB BLD-MCNC: 12.2 G/DL
IMM GRANULOCYTES NFR BLD AUTO: 0.1 %
LYMPHOCYTES # BLD AUTO: 2.35 K/UL
LYMPHOCYTES NFR BLD AUTO: 30.5 %
MAN DIFF?: NORMAL
MCHC RBC-ENTMCNC: 26.3 PG
MCHC RBC-ENTMCNC: 31.6 GM/DL
MCV RBC AUTO: 83.4 FL
MONOCYTES # BLD AUTO: 0.56 K/UL
MONOCYTES NFR BLD AUTO: 7.3 %
NEUTROPHILS # BLD AUTO: 4.57 K/UL
NEUTROPHILS NFR BLD AUTO: 59.4 %
PLATELET # BLD AUTO: 369 K/UL
RBC # BLD: 4.63 M/UL
RBC # FLD: 14.5 %
WBC # FLD AUTO: 7.7 K/UL

## 2018-09-12 ENCOUNTER — MEDICATION RENEWAL (OUTPATIENT)
Age: 33
End: 2018-09-12

## 2018-10-05 ENCOUNTER — APPOINTMENT (OUTPATIENT)
Dept: INTERNAL MEDICINE | Facility: CLINIC | Age: 33
End: 2018-10-05
Payer: COMMERCIAL

## 2018-10-05 VITALS — TEMPERATURE: 98.8 F | DIASTOLIC BLOOD PRESSURE: 80 MMHG | SYSTOLIC BLOOD PRESSURE: 110 MMHG

## 2018-10-05 DIAGNOSIS — Z87.09 PERSONAL HISTORY OF OTHER DISEASES OF THE RESPIRATORY SYSTEM: ICD-10-CM

## 2018-10-05 DIAGNOSIS — L03.115 CELLULITIS OF RIGHT LOWER LIMB: ICD-10-CM

## 2018-10-05 DIAGNOSIS — K57.92 DIVERTICULITIS OF INTESTINE, PART UNSPECIFIED, W/OUT PERFORATION OR ABSCESS W/OUT BLEEDING: ICD-10-CM

## 2018-10-05 LAB
BILIRUB UR QL STRIP: NEGATIVE
CLARITY UR: CLEAR
COLLECTION METHOD: NORMAL
GLUCOSE UR-MCNC: NEGATIVE
HCG UR QL: 0.2 EU/DL
HCG UR QL: NEGATIVE
HGB UR QL STRIP.AUTO: NORMAL
KETONES UR-MCNC: NEGATIVE
LEUKOCYTE ESTERASE UR QL STRIP: NEGATIVE
NITRITE UR QL STRIP: NEGATIVE
PH UR STRIP: 6.5
PROT UR STRIP-MCNC: NEGATIVE
QUALITY CONTROL: YES
SP GR UR STRIP: 1.02

## 2018-10-05 PROCEDURE — 81003 URINALYSIS AUTO W/O SCOPE: CPT | Mod: QW

## 2018-10-05 PROCEDURE — 99213 OFFICE O/P EST LOW 20 MIN: CPT | Mod: 25

## 2018-10-05 PROCEDURE — 81025 URINE PREGNANCY TEST: CPT

## 2018-10-05 PROCEDURE — 36415 COLL VENOUS BLD VENIPUNCTURE: CPT

## 2018-10-05 RX ORDER — METHYLPREDNISOLONE 4 MG/1
4 TABLET ORAL
Qty: 1 | Refills: 0 | Status: DISCONTINUED | COMMUNITY
Start: 2018-04-19 | End: 2018-10-05

## 2018-10-05 RX ORDER — AMOXICILLIN AND CLAVULANATE POTASSIUM 875; 125 MG/1; 1/1
875-125 TABLET, FILM COATED ORAL
Qty: 20 | Refills: 0 | Status: DISCONTINUED | COMMUNITY
Start: 2018-08-23 | End: 2018-10-05

## 2018-10-08 LAB
BASOPHILS # BLD AUTO: 0.02 K/UL
BASOPHILS NFR BLD AUTO: 0.2 %
EOSINOPHIL # BLD AUTO: 0.19 K/UL
EOSINOPHIL NFR BLD AUTO: 2.2 %
HCT VFR BLD CALC: 37.7 %
HGB BLD-MCNC: 12.2 G/DL
IMM GRANULOCYTES NFR BLD AUTO: 0.2 %
LYMPHOCYTES # BLD AUTO: 2.57 K/UL
LYMPHOCYTES NFR BLD AUTO: 30.1 %
MAN DIFF?: NORMAL
MCHC RBC-ENTMCNC: 26.5 PG
MCHC RBC-ENTMCNC: 32.4 GM/DL
MCV RBC AUTO: 82 FL
MONOCYTES # BLD AUTO: 0.67 K/UL
MONOCYTES NFR BLD AUTO: 7.9 %
NEUTROPHILS # BLD AUTO: 5.06 K/UL
NEUTROPHILS NFR BLD AUTO: 59.4 %
PLATELET # BLD AUTO: 305 K/UL
RBC # BLD: 4.6 M/UL
RBC # FLD: 14.7 %
WBC # FLD AUTO: 8.53 K/UL

## 2018-10-11 ENCOUNTER — APPOINTMENT (OUTPATIENT)
Dept: ULTRASOUND IMAGING | Facility: CLINIC | Age: 33
End: 2018-10-11

## 2018-10-15 ENCOUNTER — MEDICATION RENEWAL (OUTPATIENT)
Age: 33
End: 2018-10-15

## 2018-10-18 ENCOUNTER — MEDICATION RENEWAL (OUTPATIENT)
Age: 33
End: 2018-10-18

## 2018-10-25 ENCOUNTER — APPOINTMENT (OUTPATIENT)
Dept: INTERNAL MEDICINE | Facility: CLINIC | Age: 33
End: 2018-10-25
Payer: COMMERCIAL

## 2018-10-25 VITALS — HEART RATE: 72 BPM | SYSTOLIC BLOOD PRESSURE: 120 MMHG | RESPIRATION RATE: 14 BRPM | DIASTOLIC BLOOD PRESSURE: 80 MMHG

## 2018-10-25 PROCEDURE — 99214 OFFICE O/P EST MOD 30 MIN: CPT

## 2018-10-25 NOTE — ASSESSMENT
[FreeTextEntry1] : \par Pt with long history of diverticulitis\par Doubt acute diverticulitis in light of emotional stressors - likely panic attacks/anxiety.\par F/U with surgery - Dr Garibay - next week.\par \par Continue duloxetine 60mg daily\par \par F/U if symptoms do not resolve, or if they should change/worsen.\par \par F/U for medical clearance prior to surgery.\par

## 2018-10-25 NOTE — REVIEW OF SYSTEMS
[Abdominal Pain] : abdominal pain [Nausea] : no nausea [Vomiting] : no vomiting [Negative] : Respiratory

## 2018-10-25 NOTE — HISTORY OF PRESENT ILLNESS
[de-identified] : Pt presents for evaluation -\par continues to have a lot of stress at home\par Custody/Family issues.\par Taking Cymbalta - increased on her own to 60mg last week.\par \par Had episode of LLQ abdominal pain earlier this week - went to GI. No evidence of acute diverticulitis, but was again advised to see surgery for further evaluation and partial colectomy. here to discuss

## 2018-10-31 ENCOUNTER — APPOINTMENT (OUTPATIENT)
Dept: COLORECTAL SURGERY | Facility: CLINIC | Age: 33
End: 2018-10-31
Payer: COMMERCIAL

## 2018-10-31 VITALS
DIASTOLIC BLOOD PRESSURE: 70 MMHG | RESPIRATION RATE: 12 BRPM | BODY MASS INDEX: 32.78 KG/M2 | HEIGHT: 64 IN | WEIGHT: 192 LBS | HEART RATE: 68 BPM | SYSTOLIC BLOOD PRESSURE: 120 MMHG

## 2018-10-31 PROCEDURE — 99243 OFF/OP CNSLTJ NEW/EST LOW 30: CPT

## 2018-10-31 RX ORDER — DICLOFENAC SODIUM 75 MG/1
75 TABLET, DELAYED RELEASE ORAL
Qty: 60 | Refills: 0 | Status: DISCONTINUED | COMMUNITY
Start: 2017-11-18 | End: 2018-10-31

## 2018-10-31 RX ORDER — ALBUTEROL SULFATE 90 UG/1
108 (90 BASE) AEROSOL, METERED RESPIRATORY (INHALATION) EVERY 4 HOURS
Qty: 1 | Refills: 0 | Status: DISCONTINUED | COMMUNITY
Start: 2018-04-19 | End: 2018-10-31

## 2018-11-12 ENCOUNTER — APPOINTMENT (OUTPATIENT)
Dept: INTERNAL MEDICINE | Facility: CLINIC | Age: 33
End: 2018-11-12

## 2018-11-12 ENCOUNTER — MEDICATION RENEWAL (OUTPATIENT)
Age: 33
End: 2018-11-12

## 2018-11-17 ENCOUNTER — APPOINTMENT (OUTPATIENT)
Dept: INTERNAL MEDICINE | Facility: CLINIC | Age: 33
End: 2018-11-17
Payer: COMMERCIAL

## 2018-11-17 PROCEDURE — 36415 COLL VENOUS BLD VENIPUNCTURE: CPT

## 2018-11-19 ENCOUNTER — APPOINTMENT (OUTPATIENT)
Dept: INTERNAL MEDICINE | Facility: CLINIC | Age: 33
End: 2018-11-19
Payer: COMMERCIAL

## 2018-11-19 VITALS — HEART RATE: 72 BPM | DIASTOLIC BLOOD PRESSURE: 70 MMHG | RESPIRATION RATE: 14 BRPM | SYSTOLIC BLOOD PRESSURE: 120 MMHG

## 2018-11-19 LAB
25(OH)D3 SERPL-MCNC: 23.9 NG/ML
ALBUMIN SERPL ELPH-MCNC: 4.4 G/DL
ALP BLD-CCNC: 67 U/L
ALT SERPL-CCNC: 16 U/L
ANION GAP SERPL CALC-SCNC: 14 MMOL/L
APPEARANCE: CLEAR
APTT BLD: 33.8 SEC
AST SERPL-CCNC: 21 U/L
BASOPHILS # BLD AUTO: 0.02 K/UL
BASOPHILS NFR BLD AUTO: 0.2 %
BILIRUB SERPL-MCNC: 0.3 MG/DL
BILIRUBIN URINE: NEGATIVE
BLOOD URINE: NEGATIVE
BUN SERPL-MCNC: 13 MG/DL
CALCIUM SERPL-MCNC: 9.6 MG/DL
CHLORIDE SERPL-SCNC: 102 MMOL/L
CHOLEST SERPL-MCNC: 188 MG/DL
CHOLEST/HDLC SERPL: 3.4 RATIO
CO2 SERPL-SCNC: 21 MMOL/L
COLOR: YELLOW
CREAT SERPL-MCNC: 0.69 MG/DL
EOSINOPHIL # BLD AUTO: 0.18 K/UL
EOSINOPHIL NFR BLD AUTO: 2.2 %
GLUCOSE QUALITATIVE U: NEGATIVE MG/DL
GLUCOSE SERPL-MCNC: 63 MG/DL
HCT VFR BLD CALC: 36.8 %
HDLC SERPL-MCNC: 55 MG/DL
HGB BLD-MCNC: 11.7 G/DL
HIV1+2 AB SPEC QL IA.RAPID: NONREACTIVE
IMM GRANULOCYTES NFR BLD AUTO: 0.4 %
INR PPP: 1.03 RATIO
IRON SATN MFR SERPL: 11 %
IRON SERPL-MCNC: 51 UG/DL
KETONES URINE: NEGATIVE
LDLC SERPL CALC-MCNC: 113 MG/DL
LEUKOCYTE ESTERASE URINE: NEGATIVE
LYMPHOCYTES # BLD AUTO: 2.62 K/UL
LYMPHOCYTES NFR BLD AUTO: 32.5 %
MAN DIFF?: NORMAL
MCHC RBC-ENTMCNC: 26.3 PG
MCHC RBC-ENTMCNC: 31.8 GM/DL
MCV RBC AUTO: 82.7 FL
MONOCYTES # BLD AUTO: 0.7 K/UL
MONOCYTES NFR BLD AUTO: 8.7 %
NEUTROPHILS # BLD AUTO: 4.5 K/UL
NEUTROPHILS NFR BLD AUTO: 56 %
NITRITE URINE: NEGATIVE
PH URINE: 7
PLATELET # BLD AUTO: 373 K/UL
POTASSIUM SERPL-SCNC: 3.8 MMOL/L
PROT SERPL-MCNC: 7.9 G/DL
PROTEIN URINE: NEGATIVE MG/DL
PT BLD: 11.7 SEC
RBC # BLD: 4.45 M/UL
RBC # FLD: 14.7 %
SODIUM SERPL-SCNC: 137 MMOL/L
SPECIFIC GRAVITY URINE: 1.02
T4 SERPL-MCNC: 7.8 UG/DL
TIBC SERPL-MCNC: 458 UG/DL
TRIGL SERPL-MCNC: 99 MG/DL
TSH SERPL-ACNC: 2.49 UIU/ML
UIBC SERPL-MCNC: 407 UG/DL
UROBILINOGEN URINE: 1 MG/DL
WBC # FLD AUTO: 8.05 K/UL

## 2018-11-19 PROCEDURE — 99213 OFFICE O/P EST LOW 20 MIN: CPT

## 2018-11-19 NOTE — HISTORY OF PRESENT ILLNESS
[FreeTextEntry8] : Pt presents with several days of "throat tickle". NO fever/chills.\par No cough.\par No swollen glands.\par Pt denies postnasal drip.\par No wheeze

## 2018-11-19 NOTE — ASSESSMENT
[FreeTextEntry1] : Trial of Flonase\par ProAir PRN\par \par F/U if symptoms do not resolve, or if they should change/worsen.\par

## 2018-11-19 NOTE — PHYSICAL EXAM
[Normal Oropharynx] : the oropharynx was normal [Supple] : supple [No Respiratory Distress] : no respiratory distress  [Clear to Auscultation] : lungs were clear to auscultation bilaterally [No Accessory Muscle Use] : no accessory muscle use [Normal Rate] : normal rate  [Regular Rhythm] : with a regular rhythm [Normal S1, S2] : normal S1 and S2 [No Edema] : there was no peripheral edema [Soft] : abdomen soft [Non Tender] : non-tender [Normal Bowel Sounds] : normal bowel sounds [de-identified] : very emotional/crying

## 2018-11-21 ENCOUNTER — OUTPATIENT (OUTPATIENT)
Dept: OUTPATIENT SERVICES | Facility: HOSPITAL | Age: 33
LOS: 1 days | End: 2018-11-21
Payer: COMMERCIAL

## 2018-11-21 VITALS
RESPIRATION RATE: 13 BRPM | WEIGHT: 197.53 LBS | DIASTOLIC BLOOD PRESSURE: 86 MMHG | SYSTOLIC BLOOD PRESSURE: 136 MMHG | TEMPERATURE: 97 F | OXYGEN SATURATION: 100 % | HEART RATE: 97 BPM | HEIGHT: 64 IN

## 2018-11-21 DIAGNOSIS — K57.32 DIVERTICULITIS OF LARGE INTESTINE WITHOUT PERFORATION OR ABSCESS WITHOUT BLEEDING: ICD-10-CM

## 2018-11-21 DIAGNOSIS — Z98.51 TUBAL LIGATION STATUS: Chronic | ICD-10-CM

## 2018-11-21 DIAGNOSIS — Z29.9 ENCOUNTER FOR PROPHYLACTIC MEASURES, UNSPECIFIED: ICD-10-CM

## 2018-11-21 DIAGNOSIS — F41.9 ANXIETY DISORDER, UNSPECIFIED: ICD-10-CM

## 2018-11-21 DIAGNOSIS — Z01.84 ENCOUNTER FOR ANTIBODY RESPONSE EXAMINATION: ICD-10-CM

## 2018-11-21 LAB
ANION GAP SERPL CALC-SCNC: 11 MMOL/L — SIGNIFICANT CHANGE UP (ref 5–17)
BLD GP AB SCN SERPL QL: NEGATIVE — SIGNIFICANT CHANGE UP
BUN SERPL-MCNC: 12 MG/DL — SIGNIFICANT CHANGE UP (ref 7–23)
CALCIUM SERPL-MCNC: 9.2 MG/DL — SIGNIFICANT CHANGE UP (ref 8.4–10.5)
CHLORIDE SERPL-SCNC: 103 MMOL/L — SIGNIFICANT CHANGE UP (ref 96–108)
CO2 SERPL-SCNC: 23 MMOL/L — SIGNIFICANT CHANGE UP (ref 22–31)
CREAT SERPL-MCNC: 0.61 MG/DL — SIGNIFICANT CHANGE UP (ref 0.5–1.3)
GLUCOSE SERPL-MCNC: 89 MG/DL — SIGNIFICANT CHANGE UP (ref 70–99)
HBA1C BLD-MCNC: 5.1 % — SIGNIFICANT CHANGE UP (ref 4–5.6)
HCT VFR BLD CALC: 33.8 % — LOW (ref 34.5–45)
HGB BLD-MCNC: 11.4 G/DL — LOW (ref 11.5–15.5)
MCHC RBC-ENTMCNC: 26.7 PG — LOW (ref 27–34)
MCHC RBC-ENTMCNC: 33.7 GM/DL — SIGNIFICANT CHANGE UP (ref 32–36)
MCV RBC AUTO: 79.2 FL — LOW (ref 80–100)
PLATELET # BLD AUTO: 303 K/UL — SIGNIFICANT CHANGE UP (ref 150–400)
POTASSIUM SERPL-MCNC: 4.2 MMOL/L — SIGNIFICANT CHANGE UP (ref 3.5–5.3)
POTASSIUM SERPL-SCNC: 4.2 MMOL/L — SIGNIFICANT CHANGE UP (ref 3.5–5.3)
RBC # BLD: 4.27 M/UL — SIGNIFICANT CHANGE UP (ref 3.8–5.2)
RBC # FLD: 14.7 % — HIGH (ref 10.3–14.5)
RH IG SCN BLD-IMP: POSITIVE — SIGNIFICANT CHANGE UP
SODIUM SERPL-SCNC: 137 MMOL/L — SIGNIFICANT CHANGE UP (ref 135–145)
WBC # BLD: 7.53 K/UL — SIGNIFICANT CHANGE UP (ref 3.8–10.5)
WBC # FLD AUTO: 7.53 K/UL — SIGNIFICANT CHANGE UP (ref 3.8–10.5)

## 2018-11-21 PROCEDURE — 85027 COMPLETE CBC AUTOMATED: CPT

## 2018-11-21 PROCEDURE — 80048 BASIC METABOLIC PNL TOTAL CA: CPT

## 2018-11-21 PROCEDURE — 86901 BLOOD TYPING SEROLOGIC RH(D): CPT

## 2018-11-21 PROCEDURE — 83036 HEMOGLOBIN GLYCOSYLATED A1C: CPT

## 2018-11-21 PROCEDURE — 86900 BLOOD TYPING SEROLOGIC ABO: CPT

## 2018-11-21 PROCEDURE — 87086 URINE CULTURE/COLONY COUNT: CPT

## 2018-11-21 PROCEDURE — G0463: CPT

## 2018-11-21 PROCEDURE — 86850 RBC ANTIBODY SCREEN: CPT

## 2018-11-21 NOTE — H&P PST ADULT - ASSESSMENT
CAPRINI SCORE [CLOT]    AGE RELATED RISK FACTORS                                                       MOBILITY RELATED FACTORS  [ ] Age 41-60 years                                            (1 Point)                  [ ] Bed rest                                                        (1 Point)  [ ] Age: 61-74 years                                           (2 Points)                 [ ] Plaster cast                                                   (2 Points)  [ ] Age= 75 years                                              (3 Points)                 [ ] Bed bound for more than 72 hours                 (2 Points)    DISEASE RELATED RISK FACTORS                                               GENDER SPECIFIC FACTORS  [ ] Edema in the lower extremities                       (1 Point)                  [ ] Pregnancy                                                     (1 Point)  [ ] Varicose veins                                               (1 Point)                  [ ] Post-partum < 6 weeks                                   (1 Point)             [x ] BMI > 25 Kg/m2                                            (1 Point)                  [ ] Hormonal therapy  or oral contraception          (1 Point)                 [ ] Sepsis (in the previous month)                        (1 Point)                  [ ] History of pregnancy complications                 (1 point)  [ ] Pneumonia or serious lung disease                                               [ ] Unexplained or recurrent                     (1 Point)           (in the previous month)                               (1 Point)  [ ] Abnormal pulmonary function test                     (1 Point)                 SURGERY RELATED RISK FACTORS  [ ] Acute myocardial infarction                              (1 Point)                 [ ]  Section                                             (1 Point)  [ ] Congestive heart failure (in the previous month)  (1 Point)               [ ] Minor surgery                                                  (1 Point)   [ ] Inflammatory bowel disease                             (1 Point)                 [ ] Arthroscopic surgery                                        (2 Points)  [ ] Central venous access                                      (2 Points)                [ x] General surgery lasting more than 45 minutes   (2 Points)       [ ] Stroke (in the previous month)                          (5 Points)               [ ] Elective arthroplasty                                         (5 Points)                                                                                                                                               HEMATOLOGY RELATED FACTORS                                                 TRAUMA RELATED RISK FACTORS  [ ] Prior episodes of VTE                                     (3 Points)                 [ ] Fracture of the hip, pelvis, or leg                       (5 Points)  [ ] Positive family history for VTE                         (3 Points)                 [ ] Acute spinal cord injury (in the previous month)  (5 Points)  [ ] Prothrombin 01855 A                                     (3 Points)                 [ ] Paralysis  (less than 1 month)                             (5 Points)  [ ] Factor V Leiden                                             (3 Points)                  [ ] Multiple Trauma within 1 month                        (5 Points)  [ ] Lupus anticoagulants                                     (3 Points)                                                           [ ] Anticardiolipin antibodies                               (3 Points)                                                       [ ] High homocysteine in the blood                      (3 Points)                                             [ ] Other congenital or acquired thrombophilia      (3 Points)                                                [ ] Heparin induced thrombocytopenia                  (3 Points)                                          Total Score [    3      ]

## 2018-11-21 NOTE — H&P PST ADULT - HISTORY OF PRESENT ILLNESS
Mrs. Murphy is a 33 year old woman with anxiety and diverticulitis s/p 8 episodes last episode 8/2018 now scheduled for laparoscopic low anterior resection, cystoscopy insertion of ureteral catheters.

## 2018-11-21 NOTE — H&P PST ADULT - PROBLEM SELECTOR PLAN 1
Scheduled for laparoscopic low anterior resection with cystoscopy insertion of ureteral catheters.  Urine cx. pending.  Labs pending.

## 2018-11-21 NOTE — H&P PST ADULT - GASTROINTESTINAL COMMENTS
8/2018 steady abdominal pain to ED, imaging revealed diverticulitis sent home on antibiotics, no complaints at this time

## 2018-11-21 NOTE — H&P PST ADULT - NEGATIVE ALLERGY TYPES
no outdoor environmental allergies/no reactions to animals/no indoor environmental allergies/no reactions to medicines

## 2018-11-22 LAB
CULTURE RESULTS: SIGNIFICANT CHANGE UP
SPECIMEN SOURCE: SIGNIFICANT CHANGE UP

## 2018-11-26 ENCOUNTER — APPOINTMENT (OUTPATIENT)
Dept: INTERNAL MEDICINE | Facility: CLINIC | Age: 33
End: 2018-11-26
Payer: COMMERCIAL

## 2018-11-26 ENCOUNTER — NON-APPOINTMENT (OUTPATIENT)
Age: 33
End: 2018-11-26

## 2018-11-26 VITALS
HEIGHT: 64 IN | RESPIRATION RATE: 14 BRPM | WEIGHT: 198 LBS | BODY MASS INDEX: 33.8 KG/M2 | HEART RATE: 72 BPM | SYSTOLIC BLOOD PRESSURE: 120 MMHG | DIASTOLIC BLOOD PRESSURE: 70 MMHG

## 2018-11-26 PROCEDURE — 93000 ELECTROCARDIOGRAM COMPLETE: CPT

## 2018-11-26 PROCEDURE — 99243 OFF/OP CNSLTJ NEW/EST LOW 30: CPT | Mod: 25

## 2018-11-26 RX ORDER — ZOLPIDEM TARTRATE 12.5 MG/1
12.5 TABLET, EXTENDED RELEASE ORAL
Qty: 30 | Refills: 1 | Status: DISCONTINUED | COMMUNITY
Start: 2018-08-15 | End: 2018-11-26

## 2018-11-28 NOTE — HISTORY OF PRESENT ILLNESS
[(Patient denies any chest pain, claudication, dyspnea on exertion, orthopnea, palpitations or syncope)] : Patient denies any chest pain, claudication, dyspnea on exertion, orthopnea, palpitations or syncope [No Pertinent Cardiac History] : no history of aortic stenosis, atrial fibrillation, coronary artery disease, recent myocardial infarction, or implantable device/pacemaker [No Pertinent Pulmonary History] : no history of asthma, COPD, sleep apnea, or smoking [No Adverse Anesthesia Reaction] : no adverse anesthesia reaction in self or family member [Excellent (>10 METs)] : Excellent (>10 METs) [Chronic Anticoagulation] : no chronic anticoagulation [Chronic Kidney Disease] : no chronic kidney disease [Diabetes] : no diabetes [FreeTextEntry1] : partial colectomy [FreeTextEntry2] : 12/4/18 [FreeTextEntry3] : Dr Garibay [FreeTextEntry4] : Pt presents for evaluation prior to surgery.\par She is without acute complaints.\par Admits to anxiety over upcoming surgery.

## 2018-11-28 NOTE — CONSULT LETTER
[Dear  ___] : Dear  [unfilled], [Consult Letter:] : I had the pleasure of evaluating your patient, [unfilled]. [Please see my note below.] : Please see my note below. [Consult Closing:] : Thank you very much for allowing me to participate in the care of this patient.  If you have any questions, please do not hesitate to contact me. [Sincerely,] : Sincerely, [FreeTextEntry3] : Andrade Flores MD\par [facility]\par  of Medicine,\par [employer]\par \par

## 2018-11-28 NOTE — ASSESSMENT
[Patient Optimized for Surgery] : Patient optimized for surgery [No Further Testing Recommended] : no further testing recommended [As per surgery] : as per surgery [FreeTextEntry7] : No NSAIDS/ASA one week prior to surgery

## 2018-12-01 ENCOUNTER — MEDICATION RENEWAL (OUTPATIENT)
Age: 33
End: 2018-12-01

## 2018-12-03 ENCOUNTER — TRANSCRIPTION ENCOUNTER (OUTPATIENT)
Age: 33
End: 2018-12-03

## 2018-12-04 ENCOUNTER — RESULT REVIEW (OUTPATIENT)
Age: 33
End: 2018-12-04

## 2018-12-04 ENCOUNTER — INPATIENT (INPATIENT)
Facility: HOSPITAL | Age: 33
LOS: 2 days | Discharge: HOME | DRG: 331 | End: 2018-12-07
Attending: SURGERY | Admitting: SURGERY
Payer: COMMERCIAL

## 2018-12-04 ENCOUNTER — APPOINTMENT (OUTPATIENT)
Dept: COLORECTAL SURGERY | Facility: HOSPITAL | Age: 33
End: 2018-12-04

## 2018-12-04 VITALS
OXYGEN SATURATION: 99 % | DIASTOLIC BLOOD PRESSURE: 78 MMHG | HEART RATE: 88 BPM | RESPIRATION RATE: 16 BRPM | WEIGHT: 197.53 LBS | HEIGHT: 64 IN | SYSTOLIC BLOOD PRESSURE: 114 MMHG | TEMPERATURE: 99 F

## 2018-12-04 DIAGNOSIS — K57.32 DIVERTICULITIS OF LARGE INTESTINE WITHOUT PERFORATION OR ABSCESS WITHOUT BLEEDING: ICD-10-CM

## 2018-12-04 DIAGNOSIS — Z98.51 TUBAL LIGATION STATUS: Chronic | ICD-10-CM

## 2018-12-04 LAB
GLUCOSE BLDC GLUCOMTR-MCNC: 93 MG/DL — SIGNIFICANT CHANGE UP (ref 70–99)
HCT VFR BLD CALC: 35.2 % — SIGNIFICANT CHANGE UP (ref 34.5–45)
HGB BLD-MCNC: 11.9 G/DL — SIGNIFICANT CHANGE UP (ref 11.5–15.5)
MCHC RBC-ENTMCNC: 26.6 PG — LOW (ref 27–34)
MCHC RBC-ENTMCNC: 33.9 GM/DL — SIGNIFICANT CHANGE UP (ref 32–36)
MCV RBC AUTO: 78.6 FL — LOW (ref 80–100)
PLATELET # BLD AUTO: 284 K/UL — SIGNIFICANT CHANGE UP (ref 150–400)
RBC # BLD: 4.48 M/UL — SIGNIFICANT CHANGE UP (ref 3.8–5.2)
RBC # FLD: 13 % — SIGNIFICANT CHANGE UP (ref 10.3–14.5)
WBC # BLD: 16.6 K/UL — HIGH (ref 3.8–10.5)
WBC # FLD AUTO: 16.6 K/UL — HIGH (ref 3.8–10.5)

## 2018-12-04 PROCEDURE — 88307 TISSUE EXAM BY PATHOLOGIST: CPT | Mod: 26

## 2018-12-04 PROCEDURE — 93010 ELECTROCARDIOGRAM REPORT: CPT

## 2018-12-04 PROCEDURE — 44207 L COLECTOMY/COLOPROCTOSTOMY: CPT | Mod: 80

## 2018-12-04 PROCEDURE — 45300 PROCTOSIGMOIDOSCOPY DX: CPT

## 2018-12-04 PROCEDURE — 44207 L COLECTOMY/COLOPROCTOSTOMY: CPT

## 2018-12-04 RX ORDER — HYDROMORPHONE HYDROCHLORIDE 2 MG/ML
0.5 INJECTION INTRAMUSCULAR; INTRAVENOUS; SUBCUTANEOUS
Qty: 0 | Refills: 0 | Status: DISCONTINUED | OUTPATIENT
Start: 2018-12-04 | End: 2018-12-04

## 2018-12-04 RX ORDER — SODIUM CHLORIDE 9 MG/ML
1000 INJECTION INTRAMUSCULAR; INTRAVENOUS; SUBCUTANEOUS
Qty: 0 | Refills: 0 | Status: DISCONTINUED | OUTPATIENT
Start: 2018-12-04 | End: 2018-12-05

## 2018-12-04 RX ORDER — ENOXAPARIN SODIUM 100 MG/ML
40 INJECTION SUBCUTANEOUS EVERY 24 HOURS
Qty: 0 | Refills: 0 | Status: DISCONTINUED | OUTPATIENT
Start: 2018-12-04 | End: 2018-12-04

## 2018-12-04 RX ORDER — MORPHINE SULFATE 50 MG/1
2 CAPSULE, EXTENDED RELEASE ORAL EVERY 4 HOURS
Qty: 0 | Refills: 0 | Status: DISCONTINUED | OUTPATIENT
Start: 2018-12-04 | End: 2018-12-06

## 2018-12-04 RX ORDER — LIDOCAINE HCL 20 MG/ML
0.2 VIAL (ML) INJECTION ONCE
Qty: 0 | Refills: 0 | Status: DISCONTINUED | OUTPATIENT
Start: 2018-12-04 | End: 2018-12-04

## 2018-12-04 RX ORDER — ACETAMINOPHEN 500 MG
1000 TABLET ORAL ONCE
Qty: 0 | Refills: 0 | Status: COMPLETED | OUTPATIENT
Start: 2018-12-05 | End: 2018-12-05

## 2018-12-04 RX ORDER — ONDANSETRON 8 MG/1
4 TABLET, FILM COATED ORAL ONCE
Qty: 0 | Refills: 0 | Status: DISCONTINUED | OUTPATIENT
Start: 2018-12-04 | End: 2018-12-04

## 2018-12-04 RX ORDER — ZOLPIDEM TARTRATE 10 MG/1
1 TABLET ORAL
Qty: 0 | Refills: 0 | COMMUNITY

## 2018-12-04 RX ORDER — HYDROMORPHONE HYDROCHLORIDE 2 MG/ML
0.5 INJECTION INTRAMUSCULAR; INTRAVENOUS; SUBCUTANEOUS ONCE
Qty: 0 | Refills: 0 | Status: DISCONTINUED | OUTPATIENT
Start: 2018-12-04 | End: 2018-12-04

## 2018-12-04 RX ORDER — ALPRAZOLAM 0.25 MG
1 TABLET ORAL
Qty: 0 | Refills: 0 | COMMUNITY

## 2018-12-04 RX ORDER — SODIUM CHLORIDE 9 MG/ML
3 INJECTION INTRAMUSCULAR; INTRAVENOUS; SUBCUTANEOUS EVERY 8 HOURS
Qty: 0 | Refills: 0 | Status: DISCONTINUED | OUTPATIENT
Start: 2018-12-04 | End: 2018-12-04

## 2018-12-04 RX ORDER — ENOXAPARIN SODIUM 100 MG/ML
30 INJECTION SUBCUTANEOUS DAILY
Qty: 0 | Refills: 0 | Status: DISCONTINUED | OUTPATIENT
Start: 2018-12-04 | End: 2018-12-07

## 2018-12-04 RX ORDER — ALPRAZOLAM 0.25 MG
0.5 TABLET ORAL ONCE
Qty: 0 | Refills: 0 | Status: DISCONTINUED | OUTPATIENT
Start: 2018-12-04 | End: 2018-12-04

## 2018-12-04 RX ORDER — ACETAMINOPHEN 500 MG
1000 TABLET ORAL ONCE
Qty: 0 | Refills: 0 | Status: COMPLETED | OUTPATIENT
Start: 2018-12-04 | End: 2018-12-04

## 2018-12-04 RX ORDER — CEFOTETAN DISODIUM 1 G
2 VIAL (EA) INJECTION ONCE
Qty: 0 | Refills: 0 | Status: COMPLETED | OUTPATIENT
Start: 2018-12-04 | End: 2018-12-04

## 2018-12-04 RX ADMIN — HYDROMORPHONE HYDROCHLORIDE 0.5 MILLIGRAM(S): 2 INJECTION INTRAMUSCULAR; INTRAVENOUS; SUBCUTANEOUS at 16:50

## 2018-12-04 RX ADMIN — SODIUM CHLORIDE 75 MILLILITER(S): 9 INJECTION INTRAMUSCULAR; INTRAVENOUS; SUBCUTANEOUS at 23:17

## 2018-12-04 RX ADMIN — MORPHINE SULFATE 2 MILLIGRAM(S): 50 CAPSULE, EXTENDED RELEASE ORAL at 16:20

## 2018-12-04 RX ADMIN — Medication 1000 MILLIGRAM(S): at 18:00

## 2018-12-04 RX ADMIN — HYDROMORPHONE HYDROCHLORIDE 0.5 MILLIGRAM(S): 2 INJECTION INTRAMUSCULAR; INTRAVENOUS; SUBCUTANEOUS at 13:45

## 2018-12-04 RX ADMIN — HYDROMORPHONE HYDROCHLORIDE 0.5 MILLIGRAM(S): 2 INJECTION INTRAMUSCULAR; INTRAVENOUS; SUBCUTANEOUS at 13:15

## 2018-12-04 RX ADMIN — Medication 400 MILLIGRAM(S): at 17:45

## 2018-12-04 RX ADMIN — ENOXAPARIN SODIUM 30 MILLIGRAM(S): 100 INJECTION SUBCUTANEOUS at 20:50

## 2018-12-04 RX ADMIN — SODIUM CHLORIDE 3 MILLILITER(S): 9 INJECTION INTRAMUSCULAR; INTRAVENOUS; SUBCUTANEOUS at 07:53

## 2018-12-04 RX ADMIN — MORPHINE SULFATE 2 MILLIGRAM(S): 50 CAPSULE, EXTENDED RELEASE ORAL at 23:16

## 2018-12-04 RX ADMIN — HYDROMORPHONE HYDROCHLORIDE 0.5 MILLIGRAM(S): 2 INJECTION INTRAMUSCULAR; INTRAVENOUS; SUBCUTANEOUS at 16:35

## 2018-12-04 RX ADMIN — HYDROMORPHONE HYDROCHLORIDE 0.5 MILLIGRAM(S): 2 INJECTION INTRAMUSCULAR; INTRAVENOUS; SUBCUTANEOUS at 18:45

## 2018-12-04 RX ADMIN — Medication 0.5 MILLIGRAM(S): at 19:16

## 2018-12-04 RX ADMIN — Medication 1000 MILLIGRAM(S): at 23:40

## 2018-12-04 RX ADMIN — HYDROMORPHONE HYDROCHLORIDE 0.5 MILLIGRAM(S): 2 INJECTION INTRAMUSCULAR; INTRAVENOUS; SUBCUTANEOUS at 13:39

## 2018-12-04 RX ADMIN — SODIUM CHLORIDE 75 MILLILITER(S): 9 INJECTION INTRAMUSCULAR; INTRAVENOUS; SUBCUTANEOUS at 13:40

## 2018-12-04 RX ADMIN — MORPHINE SULFATE 2 MILLIGRAM(S): 50 CAPSULE, EXTENDED RELEASE ORAL at 16:30

## 2018-12-04 RX ADMIN — HYDROMORPHONE HYDROCHLORIDE 0.5 MILLIGRAM(S): 2 INJECTION INTRAMUSCULAR; INTRAVENOUS; SUBCUTANEOUS at 13:30

## 2018-12-04 RX ADMIN — Medication 400 MILLIGRAM(S): at 23:17

## 2018-12-04 RX ADMIN — HYDROMORPHONE HYDROCHLORIDE 0.5 MILLIGRAM(S): 2 INJECTION INTRAMUSCULAR; INTRAVENOUS; SUBCUTANEOUS at 19:10

## 2018-12-04 RX ADMIN — HYDROMORPHONE HYDROCHLORIDE 0.5 MILLIGRAM(S): 2 INJECTION INTRAMUSCULAR; INTRAVENOUS; SUBCUTANEOUS at 18:30

## 2018-12-04 RX ADMIN — MORPHINE SULFATE 2 MILLIGRAM(S): 50 CAPSULE, EXTENDED RELEASE ORAL at 23:40

## 2018-12-04 RX ADMIN — HYDROMORPHONE HYDROCHLORIDE 0.5 MILLIGRAM(S): 2 INJECTION INTRAMUSCULAR; INTRAVENOUS; SUBCUTANEOUS at 19:25

## 2018-12-04 RX ADMIN — SODIUM CHLORIDE 75 MILLILITER(S): 9 INJECTION INTRAMUSCULAR; INTRAVENOUS; SUBCUTANEOUS at 22:28

## 2018-12-04 NOTE — PROGRESS NOTE ADULT - ASSESSMENT
A/P: 33y Female Diverticulitis of large intestine without perforation or abscess without bleeding s/p  Laparoscopic assisted low anterior resection of colon laparoscopic assisted anterior resection and rigid sigmoidoscopy  - Diet: NPO  - Activity: OOB as tolerated  - Labs: CBC pending  - Pain medication.   - DVT ppx  - Tachycardia f/u Heart rate after pain medication given. do EKG    [id number]

## 2018-12-04 NOTE — BRIEF OPERATIVE NOTE - PROCEDURE
<<-----Click on this checkbox to enter Procedure Laparoscopic assisted low anterior resection of colon  12/04/2018  Laparoscopic assisted anterior resection and rigid sigmoidoscopy.  Active  EBIANCHI

## 2018-12-04 NOTE — BRIEF OPERATIVE NOTE - OPERATION/FINDINGS
- Mid sigmoid diverticulitis with mild inflammation of the bowel.  - Intact anastomotic donuts.  - Negative leak test.   - Good hemostasis at the end of the case.

## 2018-12-04 NOTE — PROGRESS NOTE ADULT - SUBJECTIVE AND OBJECTIVE BOX
STATUS POST:  Laparoscopic assisted low anterior resection of colon laparoscopic assisted anterior resection and rigid sigmoidoscopy (12/04/2018)    POST OPERATIVE DAY #: 0    SUBJECTIVE: Pt seen.  Reports pain at incision site, some relief with pain meds.  Denies N/V, HA, CP , SOB, difficulty breathing.    Vital Signs Last 24 Hrs  T(C): 36.3 (04 Dec 2018 14:00), Max: 37 (04 Dec 2018 07:45)  T(F): 97.3 (04 Dec 2018 14:00), Max: 98.6 (04 Dec 2018 07:45)  HR: 105 (04 Dec 2018 15:30) (88 - 110)  BP: 105/58 (04 Dec 2018 15:30) (100/57 - 116/64)  BP(mean): 76 (04 Dec 2018 14:45) (73 - 85)  RR: 13 (04 Dec 2018 15:30) (13 - 20)  SpO2: 97% (04 Dec 2018 15:30) (96% - 100%)  I&O's Summary    04 Dec 2018 07:01  -  04 Dec 2018 16:23  --------------------------------------------------------  IN: 225 mL / OUT: 340 mL / NET: -115 mL      I&O's Detail    04 Dec 2018 07:01  -  04 Dec 2018 16:23  --------------------------------------------------------  IN:    sodium chloride 0.9%.: 225 mL  Total IN: 225 mL    OUT:    Indwelling Catheter - Urethral: 340 mL  Total OUT: 340 mL    Total NET: -115 mL      Physical Exam: WD WN NAD female, laying comfortably  HEENT: NCAT PERRLA EOMI  Chest: Tachy rate, regular rhythym. S1S2 no murmurs  abd: Incision site tender intact.  serosanguinous strikethrough  Extem: FAROM good muscle strength b/l no edema    MEDICATIONS  (STANDING):  acetaminophen  IVPB .. 1000 milliGRAM(s) IV Intermittent once  acetaminophen  IVPB .. 1000 milliGRAM(s) IV Intermittent once  cefoTEtan  IVPB 2 Gram(s) IV Intermittent once  enoxaparin Injectable 30 milliGRAM(s) SubCutaneous daily  sodium chloride 0.9%. 1000 milliLiter(s) (75 mL/Hr) IV Continuous <Continuous>    MEDICATIONS  (PRN):  HYDROmorphone  Injectable 0.5 milliGRAM(s) IV Push every 10 minutes PRN Moderate Pain (4 - 6)  morphine  - Injectable 2 milliGRAM(s) IV Push every 4 hours PRN Severe Pain (7 - 10)  ondansetron Injectable 4 milliGRAM(s) IV Push once PRN Nausea and/or Vomiting

## 2018-12-05 LAB
ANION GAP SERPL CALC-SCNC: 10 MMOL/L — SIGNIFICANT CHANGE UP (ref 5–17)
BASOPHILS # BLD AUTO: 0.01 K/UL — SIGNIFICANT CHANGE UP (ref 0–0.2)
BASOPHILS NFR BLD AUTO: 0.1 % — SIGNIFICANT CHANGE UP (ref 0–2)
BUN SERPL-MCNC: 5 MG/DL — LOW (ref 7–23)
CALCIUM SERPL-MCNC: 7.5 MG/DL — LOW (ref 8.4–10.5)
CHLORIDE SERPL-SCNC: 106 MMOL/L — SIGNIFICANT CHANGE UP (ref 96–108)
CO2 SERPL-SCNC: 21 MMOL/L — LOW (ref 22–31)
CREAT SERPL-MCNC: 0.45 MG/DL — LOW (ref 0.5–1.3)
EOSINOPHIL # BLD AUTO: 0.03 K/UL — SIGNIFICANT CHANGE UP (ref 0–0.5)
EOSINOPHIL NFR BLD AUTO: 0.2 % — SIGNIFICANT CHANGE UP (ref 0–6)
GLUCOSE SERPL-MCNC: 81 MG/DL — SIGNIFICANT CHANGE UP (ref 70–99)
HCT VFR BLD CALC: 33.1 % — LOW (ref 34.5–45)
HGB BLD-MCNC: 10.7 G/DL — LOW (ref 11.5–15.5)
IMM GRANULOCYTES NFR BLD AUTO: 0.3 % — SIGNIFICANT CHANGE UP (ref 0–1.5)
LYMPHOCYTES # BLD AUTO: 15 % — SIGNIFICANT CHANGE UP (ref 13–44)
LYMPHOCYTES # BLD AUTO: 2.14 K/UL — SIGNIFICANT CHANGE UP (ref 1–3.3)
MAGNESIUM SERPL-MCNC: 2.2 MG/DL — SIGNIFICANT CHANGE UP (ref 1.6–2.6)
MCHC RBC-ENTMCNC: 25.4 PG — LOW (ref 27–34)
MCHC RBC-ENTMCNC: 32.3 GM/DL — SIGNIFICANT CHANGE UP (ref 32–36)
MCV RBC AUTO: 78.4 FL — LOW (ref 80–100)
MONOCYTES # BLD AUTO: 0.87 K/UL — SIGNIFICANT CHANGE UP (ref 0–0.9)
MONOCYTES NFR BLD AUTO: 6.1 % — SIGNIFICANT CHANGE UP (ref 2–14)
NEUTROPHILS # BLD AUTO: 11.13 K/UL — HIGH (ref 1.8–7.4)
NEUTROPHILS NFR BLD AUTO: 78.3 % — HIGH (ref 43–77)
PHOSPHATE SERPL-MCNC: 2.1 MG/DL — LOW (ref 2.5–4.5)
PLATELET # BLD AUTO: 287 K/UL — SIGNIFICANT CHANGE UP (ref 150–400)
POTASSIUM SERPL-MCNC: 3.8 MMOL/L — SIGNIFICANT CHANGE UP (ref 3.5–5.3)
POTASSIUM SERPL-SCNC: 3.8 MMOL/L — SIGNIFICANT CHANGE UP (ref 3.5–5.3)
RBC # BLD: 4.22 M/UL — SIGNIFICANT CHANGE UP (ref 3.8–5.2)
RBC # FLD: 14.1 % — SIGNIFICANT CHANGE UP (ref 10.3–14.5)
SODIUM SERPL-SCNC: 137 MMOL/L — SIGNIFICANT CHANGE UP (ref 135–145)
WBC # BLD: 14.22 K/UL — HIGH (ref 3.8–10.5)
WBC # FLD AUTO: 14.22 K/UL — HIGH (ref 3.8–10.5)

## 2018-12-05 RX ORDER — DULOXETINE HYDROCHLORIDE 30 MG/1
60 CAPSULE, DELAYED RELEASE ORAL DAILY
Qty: 0 | Refills: 0 | Status: DISCONTINUED | OUTPATIENT
Start: 2018-12-05 | End: 2018-12-07

## 2018-12-05 RX ORDER — DEXTROSE MONOHYDRATE, SODIUM CHLORIDE, AND POTASSIUM CHLORIDE 50; .745; 4.5 G/1000ML; G/1000ML; G/1000ML
1000 INJECTION, SOLUTION INTRAVENOUS
Qty: 0 | Refills: 0 | Status: DISCONTINUED | OUTPATIENT
Start: 2018-12-05 | End: 2018-12-06

## 2018-12-05 RX ORDER — POTASSIUM PHOSPHATE, MONOBASIC POTASSIUM PHOSPHATE, DIBASIC 236; 224 MG/ML; MG/ML
15 INJECTION, SOLUTION INTRAVENOUS ONCE
Qty: 0 | Refills: 0 | Status: DISCONTINUED | OUTPATIENT
Start: 2018-12-05 | End: 2018-12-05

## 2018-12-05 RX ORDER — KETOROLAC TROMETHAMINE 30 MG/ML
30 SYRINGE (ML) INJECTION EVERY 8 HOURS
Qty: 0 | Refills: 0 | Status: DISCONTINUED | OUTPATIENT
Start: 2018-12-05 | End: 2018-12-06

## 2018-12-05 RX ORDER — POTASSIUM PHOSPHATE, MONOBASIC POTASSIUM PHOSPHATE, DIBASIC 236; 224 MG/ML; MG/ML
15 INJECTION, SOLUTION INTRAVENOUS ONCE
Qty: 0 | Refills: 0 | Status: COMPLETED | OUTPATIENT
Start: 2018-12-05 | End: 2018-12-05

## 2018-12-05 RX ORDER — MORPHINE SULFATE 50 MG/1
2 CAPSULE, EXTENDED RELEASE ORAL ONCE
Qty: 0 | Refills: 0 | Status: DISCONTINUED | OUTPATIENT
Start: 2018-12-05 | End: 2018-12-05

## 2018-12-05 RX ADMIN — Medication 30 MILLIGRAM(S): at 14:40

## 2018-12-05 RX ADMIN — MORPHINE SULFATE 2 MILLIGRAM(S): 50 CAPSULE, EXTENDED RELEASE ORAL at 00:25

## 2018-12-05 RX ADMIN — Medication 30 MILLIGRAM(S): at 21:17

## 2018-12-05 RX ADMIN — Medication 400 MILLIGRAM(S): at 06:02

## 2018-12-05 RX ADMIN — DULOXETINE HYDROCHLORIDE 60 MILLIGRAM(S): 30 CAPSULE, DELAYED RELEASE ORAL at 21:02

## 2018-12-05 RX ADMIN — MORPHINE SULFATE 2 MILLIGRAM(S): 50 CAPSULE, EXTENDED RELEASE ORAL at 18:37

## 2018-12-05 RX ADMIN — ENOXAPARIN SODIUM 30 MILLIGRAM(S): 100 INJECTION SUBCUTANEOUS at 12:16

## 2018-12-05 RX ADMIN — MORPHINE SULFATE 2 MILLIGRAM(S): 50 CAPSULE, EXTENDED RELEASE ORAL at 22:05

## 2018-12-05 RX ADMIN — MORPHINE SULFATE 2 MILLIGRAM(S): 50 CAPSULE, EXTENDED RELEASE ORAL at 18:07

## 2018-12-05 RX ADMIN — MORPHINE SULFATE 2 MILLIGRAM(S): 50 CAPSULE, EXTENDED RELEASE ORAL at 08:01

## 2018-12-05 RX ADMIN — Medication 1000 MILLIGRAM(S): at 06:17

## 2018-12-05 RX ADMIN — POTASSIUM PHOSPHATE, MONOBASIC POTASSIUM PHOSPHATE, DIBASIC 62.5 MILLIMOLE(S): 236; 224 INJECTION, SOLUTION INTRAVENOUS at 09:55

## 2018-12-05 RX ADMIN — MORPHINE SULFATE 2 MILLIGRAM(S): 50 CAPSULE, EXTENDED RELEASE ORAL at 21:47

## 2018-12-05 RX ADMIN — MORPHINE SULFATE 2 MILLIGRAM(S): 50 CAPSULE, EXTENDED RELEASE ORAL at 08:31

## 2018-12-05 RX ADMIN — Medication 30 MILLIGRAM(S): at 10:25

## 2018-12-05 RX ADMIN — Medication 30 MILLIGRAM(S): at 21:02

## 2018-12-05 RX ADMIN — Medication 30 MILLIGRAM(S): at 09:55

## 2018-12-05 RX ADMIN — MORPHINE SULFATE 2 MILLIGRAM(S): 50 CAPSULE, EXTENDED RELEASE ORAL at 03:29

## 2018-12-05 RX ADMIN — Medication 30 MILLIGRAM(S): at 14:10

## 2018-12-05 RX ADMIN — MORPHINE SULFATE 2 MILLIGRAM(S): 50 CAPSULE, EXTENDED RELEASE ORAL at 03:53

## 2018-12-05 RX ADMIN — MORPHINE SULFATE 2 MILLIGRAM(S): 50 CAPSULE, EXTENDED RELEASE ORAL at 00:45

## 2018-12-05 RX ADMIN — DEXTROSE MONOHYDRATE, SODIUM CHLORIDE, AND POTASSIUM CHLORIDE 75 MILLILITER(S): 50; .745; 4.5 INJECTION, SOLUTION INTRAVENOUS at 07:30

## 2018-12-05 NOTE — DIETITIAN INITIAL EVALUATION ADULT. - OTHER INFO
Pt seen for NPO status. Per chart, h/o Diverticulitis of large intestine without perforation or abscess POD1 s/p Laparoscopic assisted low anterior resection of colon and rigid sigmoidoscopy. Pt currently NPO. Pt denies any history of chewing/swallowing difficulty or GI distress at this time. Pt declined to have any nutrition-related questions/concerns at this time. Pt made aware RD remains available.

## 2018-12-05 NOTE — PROGRESS NOTE ADULT - ASSESSMENT
33y Female with h/o Diverticulitis of large intestine without perforation or abscess s/p Laparoscopic assisted low anterior resection of colon and rigid sigmoidoscopy on 12/4.    - Diet: NPO  - AROBF  - Activity: OOB as tolerated  - F/U AM labs  - Pain control as needed  - DVT ppx 33F with h/o Diverticulitis of large intestine without perforation or abscess POD1 s/p Laparoscopic assisted low anterior resection of colon and rigid sigmoidoscopy    - Diet: NPO  - AROBF  - mIVF  - OOB as tolerated  - F/U AM labs  - Pain control as needed  - DVT ppx  - D/c ucath and pickett f/u TOV

## 2018-12-05 NOTE — DIETITIAN INITIAL EVALUATION ADULT. - ENERGY NEEDS
Ht: 64 Wt:  197.5 pounds BMI: 33.9 kg/m2 IBW:  120 pounds(+/-10%)  No edema. No pressure ulcers documented.

## 2018-12-05 NOTE — DIETITIAN INITIAL EVALUATION ADULT. - ORAL INTAKE PTA
Pt reports good appetite and PO intake PTA.  Pt denies micronutrient supplementation PTA.  Confirmed shellfish allergy./good

## 2018-12-05 NOTE — PROGRESS NOTE ADULT - SUBJECTIVE AND OBJECTIVE BOX
S: Patient seen and examined.  Patient underwent Laparoscopic assisted low anterior resection of colon and rigid sigmoidoscopy yesterday.  Was tachycardic to the 110s in the PACU.  An EKG revealed sinus tachycardia, and STAT labs revealed a stable H/H.  Patient was given morphine for breakthrough pain and after improvement of pain, her heart rate improved to the 90s.  Patient was transferred to the floor in stable condition.  No additional acute events overnight.  Patient is tolerated PO fluids, voiding (pickett in place).  Pain is currently controlled.  Denies nausea or vomiting.  Has yet to pass flatus or a bowel movement.    O: Vital Signs  T(C): 36.9 (12-05 @ 02:07), Max: 37.2 (12-04 @ 23:07)  HR: 94 (12-05 @ 02:07) (88 - 118)  BP: 101/66 (12-05 @ 02:07) (100/57 - 133/90)  RR: 18 (12-05 @ 02:07) (13 - 20)  SpO2: 100% (12-05 @ 02:07) (96% - 100%)  12-04-18 @ 07:01  -  12-05-18 @ 02:58  --------------------------------------------------------  IN: 1545 mL / OUT: 1975 mL / NET: -430 mL      Physical Exam:   Gen: AAOx3, NAD  Chest: Tachycardic, regular rhythm, s1, s2  Resp: CTA b/l  Abdomen:  appropriately tender around incision site, dressing clean and intact with scant serosanguinous strikethrough                          11.9   16.6  )-----------( 284      ( 04 Dec 2018 16:18 )             35.2

## 2018-12-06 ENCOUNTER — TRANSCRIPTION ENCOUNTER (OUTPATIENT)
Age: 33
End: 2018-12-06

## 2018-12-06 LAB
ANION GAP SERPL CALC-SCNC: 10 MMOL/L — SIGNIFICANT CHANGE UP (ref 5–17)
BASOPHILS # BLD AUTO: 0.01 K/UL — SIGNIFICANT CHANGE UP (ref 0–0.2)
BASOPHILS NFR BLD AUTO: 0.1 % — SIGNIFICANT CHANGE UP (ref 0–2)
BUN SERPL-MCNC: 5 MG/DL — LOW (ref 7–23)
CALCIUM SERPL-MCNC: 7.7 MG/DL — LOW (ref 8.4–10.5)
CHLORIDE SERPL-SCNC: 109 MMOL/L — HIGH (ref 96–108)
CO2 SERPL-SCNC: 19 MMOL/L — LOW (ref 22–31)
CREAT SERPL-MCNC: 0.47 MG/DL — LOW (ref 0.5–1.3)
EOSINOPHIL # BLD AUTO: 0.1 K/UL — SIGNIFICANT CHANGE UP (ref 0–0.5)
EOSINOPHIL NFR BLD AUTO: 1.4 % — SIGNIFICANT CHANGE UP (ref 0–6)
GLUCOSE SERPL-MCNC: 98 MG/DL — SIGNIFICANT CHANGE UP (ref 70–99)
HCT VFR BLD CALC: 29.1 % — LOW (ref 34.5–45)
HGB BLD-MCNC: 9.4 G/DL — LOW (ref 11.5–15.5)
IMM GRANULOCYTES NFR BLD AUTO: 0.3 % — SIGNIFICANT CHANGE UP (ref 0–1.5)
LYMPHOCYTES # BLD AUTO: 1.92 K/UL — SIGNIFICANT CHANGE UP (ref 1–3.3)
LYMPHOCYTES # BLD AUTO: 26.3 % — SIGNIFICANT CHANGE UP (ref 13–44)
MAGNESIUM SERPL-MCNC: 2.2 MG/DL — SIGNIFICANT CHANGE UP (ref 1.6–2.6)
MCHC RBC-ENTMCNC: 25.5 PG — LOW (ref 27–34)
MCHC RBC-ENTMCNC: 32.3 GM/DL — SIGNIFICANT CHANGE UP (ref 32–36)
MCV RBC AUTO: 78.9 FL — LOW (ref 80–100)
MONOCYTES # BLD AUTO: 0.67 K/UL — SIGNIFICANT CHANGE UP (ref 0–0.9)
MONOCYTES NFR BLD AUTO: 9.2 % — SIGNIFICANT CHANGE UP (ref 2–14)
NEUTROPHILS # BLD AUTO: 4.58 K/UL — SIGNIFICANT CHANGE UP (ref 1.8–7.4)
NEUTROPHILS NFR BLD AUTO: 62.7 % — SIGNIFICANT CHANGE UP (ref 43–77)
PHOSPHATE SERPL-MCNC: 1.5 MG/DL — LOW (ref 2.5–4.5)
PHOSPHATE SERPL-MCNC: 2 MG/DL — LOW (ref 2.5–4.5)
PLATELET # BLD AUTO: 239 K/UL — SIGNIFICANT CHANGE UP (ref 150–400)
POTASSIUM SERPL-MCNC: 3.9 MMOL/L — SIGNIFICANT CHANGE UP (ref 3.5–5.3)
POTASSIUM SERPL-SCNC: 3.9 MMOL/L — SIGNIFICANT CHANGE UP (ref 3.5–5.3)
RBC # BLD: 3.69 M/UL — LOW (ref 3.8–5.2)
RBC # FLD: 14.3 % — SIGNIFICANT CHANGE UP (ref 10.3–14.5)
SODIUM SERPL-SCNC: 138 MMOL/L — SIGNIFICANT CHANGE UP (ref 135–145)
WBC # BLD: 7.3 K/UL — SIGNIFICANT CHANGE UP (ref 3.8–10.5)
WBC # FLD AUTO: 7.3 K/UL — SIGNIFICANT CHANGE UP (ref 3.8–10.5)

## 2018-12-06 RX ORDER — ACETAMINOPHEN 500 MG
1000 TABLET ORAL ONCE
Qty: 0 | Refills: 0 | Status: COMPLETED | OUTPATIENT
Start: 2018-12-06 | End: 2018-12-06

## 2018-12-06 RX ORDER — OXYCODONE HYDROCHLORIDE 5 MG/1
5 TABLET ORAL EVERY 6 HOURS
Qty: 0 | Refills: 0 | Status: DISCONTINUED | OUTPATIENT
Start: 2018-12-06 | End: 2018-12-07

## 2018-12-06 RX ORDER — ACETAMINOPHEN 500 MG
975 TABLET ORAL EVERY 6 HOURS
Qty: 0 | Refills: 0 | Status: DISCONTINUED | OUTPATIENT
Start: 2018-12-06 | End: 2018-12-07

## 2018-12-06 RX ORDER — POTASSIUM CHLORIDE 20 MEQ
20 PACKET (EA) ORAL ONCE
Qty: 0 | Refills: 0 | Status: COMPLETED | OUTPATIENT
Start: 2018-12-06 | End: 2018-12-06

## 2018-12-06 RX ORDER — ACETAMINOPHEN 500 MG
1000 TABLET ORAL ONCE
Qty: 0 | Refills: 0 | Status: DISCONTINUED | OUTPATIENT
Start: 2018-12-06 | End: 2018-12-06

## 2018-12-06 RX ORDER — OXYCODONE HYDROCHLORIDE 5 MG/1
10 TABLET ORAL EVERY 6 HOURS
Qty: 0 | Refills: 0 | Status: DISCONTINUED | OUTPATIENT
Start: 2018-12-06 | End: 2018-12-07

## 2018-12-06 RX ADMIN — Medication 400 MILLIGRAM(S): at 09:01

## 2018-12-06 RX ADMIN — Medication 975 MILLIGRAM(S): at 19:26

## 2018-12-06 RX ADMIN — MORPHINE SULFATE 2 MILLIGRAM(S): 50 CAPSULE, EXTENDED RELEASE ORAL at 03:03

## 2018-12-06 RX ADMIN — MORPHINE SULFATE 2 MILLIGRAM(S): 50 CAPSULE, EXTENDED RELEASE ORAL at 03:20

## 2018-12-06 RX ADMIN — OXYCODONE HYDROCHLORIDE 10 MILLIGRAM(S): 5 TABLET ORAL at 16:26

## 2018-12-06 RX ADMIN — Medication 85 MILLIMOLE(S): at 09:56

## 2018-12-06 RX ADMIN — Medication 30 MILLIGRAM(S): at 06:15

## 2018-12-06 RX ADMIN — Medication 400 MILLIGRAM(S): at 13:51

## 2018-12-06 RX ADMIN — Medication 20 MILLIEQUIVALENT(S): at 09:03

## 2018-12-06 RX ADMIN — OXYCODONE HYDROCHLORIDE 10 MILLIGRAM(S): 5 TABLET ORAL at 16:56

## 2018-12-06 RX ADMIN — Medication 62.5 MILLIMOLE(S): at 21:02

## 2018-12-06 RX ADMIN — DEXTROSE MONOHYDRATE, SODIUM CHLORIDE, AND POTASSIUM CHLORIDE 50 MILLILITER(S): 50; .745; 4.5 INJECTION, SOLUTION INTRAVENOUS at 05:59

## 2018-12-06 RX ADMIN — OXYCODONE HYDROCHLORIDE 10 MILLIGRAM(S): 5 TABLET ORAL at 23:00

## 2018-12-06 RX ADMIN — Medication 30 MILLIGRAM(S): at 06:00

## 2018-12-06 RX ADMIN — OXYCODONE HYDROCHLORIDE 10 MILLIGRAM(S): 5 TABLET ORAL at 22:31

## 2018-12-06 RX ADMIN — Medication 1000 MILLIGRAM(S): at 14:21

## 2018-12-06 RX ADMIN — Medication 975 MILLIGRAM(S): at 20:00

## 2018-12-06 RX ADMIN — DULOXETINE HYDROCHLORIDE 60 MILLIGRAM(S): 30 CAPSULE, DELAYED RELEASE ORAL at 21:02

## 2018-12-06 RX ADMIN — Medication 1000 MILLIGRAM(S): at 09:31

## 2018-12-06 RX ADMIN — Medication 30 MILLIGRAM(S): at 13:51

## 2018-12-06 RX ADMIN — ENOXAPARIN SODIUM 30 MILLIGRAM(S): 100 INJECTION SUBCUTANEOUS at 11:45

## 2018-12-06 RX ADMIN — Medication 30 MILLIGRAM(S): at 14:21

## 2018-12-06 NOTE — DISCHARGE NOTE ADULT - INSTRUCTIONS
Low fiber diet call MD // go to ER:  temperature, nausea, vomiting, pain that does not go away with pain meds; check sites daily for redness, swelling, warmth, bleeding, drainage with foul odor

## 2018-12-06 NOTE — PROGRESS NOTE ADULT - ASSESSMENT
33F with h/o Diverticulitis of large intestine without perforation or abscess POD2 s/p Laparoscopic assisted low anterior resection of colon and rigid sigmoidoscopy with ROBF    - Diet: continue CLD this AM and LRD for lunch  - OOB as tolerated  - F/U AM labs  - Pain control as needed, switch to oral pain medication + toradol  - DVT ppx

## 2018-12-06 NOTE — DISCHARGE NOTE ADULT - PATIENT PORTAL LINK FT
You can access the [facility] Patient Portal, offered by [facility], by registering with the following website: [email or web address]

## 2018-12-06 NOTE — DISCHARGE NOTE ADULT - MEDICATION SUMMARY - MEDICATIONS TO TAKE
I will START or STAY ON the medications listed below when I get home from the hospital:    oxyCODONE 5 mg oral tablet  -- 1 tab(s) by mouth every 6 hours, As Needed  for pain MDD:4   -- Caution federal law prohibits the transfer of this drug to any person other  than the person for whom it was prescribed.  It is very important that you take or use this exactly as directed.  Do not skip doses or discontinue unless directed by your doctor.  May cause drowsiness.  Alcohol may intensify this effect.  Use care when operating dangerous machinery.  This prescription cannot be refilled.  Using more of this medication than prescribed may cause serious breathing problems.    -- Indication: For pain    Cymbalta 60 mg oral delayed release capsule  -- 1 cap(s) by mouth once a day  -- Indication: For Home med     Ambien 10 mg oral tablet  -- 1 tab(s) by mouth once a day (at bedtime), As Needed  -- Indication: For Home med     Xanax 0.5 mg oral tablet  -- 1 tab(s) by mouth 3 times a day, As Needed  -- Indication: For Home med

## 2018-12-06 NOTE — DISCHARGE NOTE ADULT - CARE PROVIDER_API CALL
Juan Garibay), ColonRectal Surgery; Surgery  [address]  [address]  [place] [address]  Phone: [phone]  Fax: [phone]

## 2018-12-06 NOTE — DISCHARGE NOTE ADULT - NS AS ACTIVITY OBS
Do not drive or operate machinery/not while taking narcotic pain medication/Do not make important decisions/No Heavy lifting/straining

## 2018-12-06 NOTE — DISCHARGE NOTE ADULT - PLAN OF CARE
return to daily living activity prior to surgery, postoperative recovery WOUND CARE: Staples will be removed at follow up office visit.    BATHING: Please do not submerge wound underwater. You may shower and/or sponge bathe.  ACTIVITY: No heavy lifting anything more than 10-15lbs or straining. Otherwise, you may return to your usual level of physical activity. If you are taking narcotic pain medication (such as Percocet), do NOT drive a car, operate machinery or make important decisions.  DIET: Low fiber diet  NOTIFY YOUR SURGEON IF: You have any bleeding that does not stop, any pus draining from your wound, any fever (over 100.4 F) or chills, persistent nausea/vomiting with inability to tolerate food or liquids, persistent diarrhea, or if your pain is not controlled on your discharge pain medications.  FOLLOW-UP:  1. Please call to make a follow-up appointment within one week of discharge with Dr. Garibay (See below for office information to call for an appointment)   2. Please follow up with your primary care physician in one week regarding your hospitalization.

## 2018-12-06 NOTE — DISCHARGE NOTE ADULT - HOSPITAL COURSE
Mrs. Murphy is a 33 year old woman with anxiety and diverticulitis  and underwent a laparoscopic anterior resection with u caths on 12/4. The patient tolerated the procedure well (see operative report for full details). Postoperatively, diet was advanced as tolerated with return of bowel function.  U cath was removed on POD#1 and pickett was removed as well.  Patient began voiding on her own. Pain control was transitioned from IV pain medication to oral medications once tolerating regular diet.  She was ambulating well.  She had hypophosphatemia and phosphorus was repleted.   On day of discharge, the patient was tolerating diet, ambulating well and pain controlled. She will follow up with Dr. Garibay in 1 week.

## 2018-12-06 NOTE — DISCHARGE NOTE ADULT - CARE PLAN
Principal Discharge DX:	Diverticulitis of intestine without perforation or abscess with bleeding  Goal:	return to daily living activity prior to surgery, postoperative recovery  Assessment and plan of treatment:	WOUND CARE: Staples will be removed at follow up office visit.    BATHING: Please do not submerge wound underwater. You may shower and/or sponge bathe.  ACTIVITY: No heavy lifting anything more than 10-15lbs or straining. Otherwise, you may return to your usual level of physical activity. If you are taking narcotic pain medication (such as Percocet), do NOT drive a car, operate machinery or make important decisions.  DIET: Low fiber diet  NOTIFY YOUR SURGEON IF: You have any bleeding that does not stop, any pus draining from your wound, any fever (over 100.4 F) or chills, persistent nausea/vomiting with inability to tolerate food or liquids, persistent diarrhea, or if your pain is not controlled on your discharge pain medications.  FOLLOW-UP:  1. Please call to make a follow-up appointment within one week of discharge with Dr. Garibay (See below for office information to call for an appointment)   2. Please follow up with your primary care physician in one week regarding your hospitalization.

## 2018-12-06 NOTE — PROGRESS NOTE ADULT - SUBJECTIVE AND OBJECTIVE BOX
Red Team Surgery Progress Note    SUBJECTIVE: Patient seen and examined at the bedside. Passed TOV. No acute events overnight. Feeling better this morning. Tolerating clear liquids without nausea or vomiting. Pain is well controlled with addition of toradol. Has passed flatus, has not passed a bowel movement. Ambulating well.     VITALS  T(C): 36.2 (12-06-18 @ 10:23), Max: 37.4 (12-05-18 @ 14:10)  HR: 86 (12-06-18 @ 10:23) (86 - 99)  BP: 113/75 (12-06-18 @ 10:23) (99/68 - 113/75)  RR: 18 (12-06-18 @ 10:23) (18 - 18)  SpO2: 100% (12-06-18 @ 10:23) (97% - 100%)      Is/Os    12-05 @ 07:01  -  12-06 @ 07:00  --------------------------------------------------------  IN:    dextrose 5% + sodium chloride 0.45% with potassium chloride 20 mEq/L: 1450 mL    Solution: 250 mL  Total IN: 1700 mL    OUT:    Indwelling Catheter - Urethral: 550 mL    Voided: 800 mL  Total OUT: 1350 mL    Total NET: 350 mL      12-06 @ 07:01  -  12-06 @ 10:31  --------------------------------------------------------  IN:    Oral Fluid: 480 mL    Solution: 100 mL    Solution: 500 mL  Total IN: 1080 mL    OUT:    Voided: 450 mL  Total OUT: 450 mL    Total NET: 630 mL      PHYSICAL EXAM:   General: NAD, Lying in bed comfortably, alert, oriented x3  Pulm: Non-labored breathing  GI/Abd: Softly distended, appropriately tender, dressing with scant s/s staining, wound packed with regular packing, staples in place, no skin erythema      MEDICATIONS (STANDING): acetaminophen  IVPB .. 1000 milliGRAM(s) IV Intermittent once  acetaminophen  IVPB .. 1000 milliGRAM(s) IV Intermittent once  dextrose 5% + sodium chloride 0.45% with potassium chloride 20 mEq/L 1000 milliLiter(s) IV Continuous <Continuous>  DULoxetine 60 milliGRAM(s) Oral daily  enoxaparin Injectable 30 milliGRAM(s) SubCutaneous daily  ketorolac   Injectable 30 milliGRAM(s) IV Push every 8 hours    MEDICATIONS (PRN):morphine  - Injectable 2 milliGRAM(s) IV Push every 4 hours PRN Severe Pain (7 - 10)    LABS  CBC (12-06 @ 07:47)                              9.4<L>                         7.30    )----------------(  239        62.7  % Neutrophils, 26.3  % Lymphocytes, ANC: 4.58                                29.1<L>  CBC (12-05 @ 09:24)                              10.7<L>                         14.22<H>  )----------------(  287        78.3<H>% Neutrophils, 15.0  % Lymphocytes, ANC: 11.13<H>                              33.1<L>    BMP (12-06 @ 07:08)             138     |  109<H>  |  5<L>  		Ca++ --      Ca 7.7<L>             ---------------------------------( 98    		Mg 2.2                3.9     |  19<L>   |  0.47<L>			Ph 1.5<L>  BMP (12-05 @ 07:17)             137     |  106     |  5<L>  		Ca++ --      Ca 7.5<L>             ---------------------------------( 81    		Mg 2.2                3.8     |  21<L>   |  0.45<L>			Ph 2.1<L>

## 2018-12-07 VITALS
OXYGEN SATURATION: 100 % | HEART RATE: 108 BPM | RESPIRATION RATE: 18 BRPM | SYSTOLIC BLOOD PRESSURE: 111 MMHG | TEMPERATURE: 98 F | DIASTOLIC BLOOD PRESSURE: 77 MMHG

## 2018-12-07 LAB
ANION GAP SERPL CALC-SCNC: 10 MMOL/L — SIGNIFICANT CHANGE UP (ref 5–17)
BUN SERPL-MCNC: 5 MG/DL — LOW (ref 7–23)
CALCIUM SERPL-MCNC: 8.3 MG/DL — LOW (ref 8.4–10.5)
CHLORIDE SERPL-SCNC: 105 MMOL/L — SIGNIFICANT CHANGE UP (ref 96–108)
CO2 SERPL-SCNC: 22 MMOL/L — SIGNIFICANT CHANGE UP (ref 22–31)
CREAT SERPL-MCNC: 0.51 MG/DL — SIGNIFICANT CHANGE UP (ref 0.5–1.3)
GLUCOSE SERPL-MCNC: 85 MG/DL — SIGNIFICANT CHANGE UP (ref 70–99)
HCT VFR BLD CALC: 30.3 % — LOW (ref 34.5–45)
HGB BLD-MCNC: 9.7 G/DL — LOW (ref 11.5–15.5)
MAGNESIUM SERPL-MCNC: 2.1 MG/DL — SIGNIFICANT CHANGE UP (ref 1.6–2.6)
MCHC RBC-ENTMCNC: 25.9 PG — LOW (ref 27–34)
MCHC RBC-ENTMCNC: 32 GM/DL — SIGNIFICANT CHANGE UP (ref 32–36)
MCV RBC AUTO: 80.8 FL — SIGNIFICANT CHANGE UP (ref 80–100)
PHOSPHATE SERPL-MCNC: 3 MG/DL — SIGNIFICANT CHANGE UP (ref 2.5–4.5)
PLATELET # BLD AUTO: 247 K/UL — SIGNIFICANT CHANGE UP (ref 150–400)
POTASSIUM SERPL-MCNC: 3.7 MMOL/L — SIGNIFICANT CHANGE UP (ref 3.5–5.3)
POTASSIUM SERPL-SCNC: 3.7 MMOL/L — SIGNIFICANT CHANGE UP (ref 3.5–5.3)
RBC # BLD: 3.75 M/UL — LOW (ref 3.8–5.2)
RBC # FLD: 14.3 % — SIGNIFICANT CHANGE UP (ref 10.3–14.5)
SODIUM SERPL-SCNC: 137 MMOL/L — SIGNIFICANT CHANGE UP (ref 135–145)
SURGICAL PATHOLOGY STUDY: SIGNIFICANT CHANGE UP
WBC # BLD: 5.18 K/UL — SIGNIFICANT CHANGE UP (ref 3.8–10.5)
WBC # FLD AUTO: 5.18 K/UL — SIGNIFICANT CHANGE UP (ref 3.8–10.5)

## 2018-12-07 PROCEDURE — 84100 ASSAY OF PHOSPHORUS: CPT

## 2018-12-07 PROCEDURE — 88307 TISSUE EXAM BY PATHOLOGIST: CPT

## 2018-12-07 PROCEDURE — 83735 ASSAY OF MAGNESIUM: CPT

## 2018-12-07 PROCEDURE — C1758: CPT

## 2018-12-07 PROCEDURE — 93005 ELECTROCARDIOGRAM TRACING: CPT

## 2018-12-07 PROCEDURE — 82962 GLUCOSE BLOOD TEST: CPT

## 2018-12-07 PROCEDURE — 85027 COMPLETE CBC AUTOMATED: CPT

## 2018-12-07 PROCEDURE — 80048 BASIC METABOLIC PNL TOTAL CA: CPT

## 2018-12-07 RX ORDER — OXYCODONE HYDROCHLORIDE 5 MG/1
1 TABLET ORAL
Qty: 12 | Refills: 0 | OUTPATIENT
Start: 2018-12-07 | End: 2018-12-09

## 2018-12-07 RX ORDER — POTASSIUM CHLORIDE 20 MEQ
20 PACKET (EA) ORAL ONCE
Qty: 0 | Refills: 0 | Status: COMPLETED | OUTPATIENT
Start: 2018-12-07 | End: 2018-12-07

## 2018-12-07 RX ADMIN — ENOXAPARIN SODIUM 30 MILLIGRAM(S): 100 INJECTION SUBCUTANEOUS at 11:05

## 2018-12-07 RX ADMIN — OXYCODONE HYDROCHLORIDE 5 MILLIGRAM(S): 5 TABLET ORAL at 09:38

## 2018-12-07 RX ADMIN — Medication 975 MILLIGRAM(S): at 09:36

## 2018-12-07 RX ADMIN — Medication 20 MILLIEQUIVALENT(S): at 10:40

## 2018-12-07 RX ADMIN — Medication 975 MILLIGRAM(S): at 00:59

## 2018-12-07 RX ADMIN — Medication 975 MILLIGRAM(S): at 01:30

## 2018-12-07 NOTE — PROGRESS NOTE ADULT - SUBJECTIVE AND OBJECTIVE BOX
Red Team Surgery Progress Note    SUBJECTIVE: Patient seen and examined at the bedside. No acute events overnight. Feeling well this morning. Tolerating low fiber diet without nausea or vomiting. Pain is well controlled. Has passed flatus and a bowel movement. Ambulating well.     VITALS  T(C): 36.9 (12-07-18 @ 05:09), Max: 36.9 (12-07-18 @ 00:59)  HR: 91 (12-07-18 @ 05:09) (86 - 108)  BP: 116/80 (12-07-18 @ 05:09) (108/68 - 119/80)  RR: 18 (12-07-18 @ 05:09) (18 - 18)  SpO2: 99% (12-07-18 @ 05:09) (99% - 100%)    Is/Os    12-06 @ 07:01  -  12-07 @ 07:00  --------------------------------------------------------  IN:    dextrose 5% + sodium chloride 0.45% with potassium chloride 20 mEq/L: 562 mL    Oral Fluid: 1360 mL    Solution: 750 mL    Solution: 200 mL  Total IN: 2872 mL    OUT:    Voided: 2150 mL  Total OUT: 2150 mL    Total NET: 722 mL    PHYSICAL EXAM:   General: NAD, Lying in bed comfortably, alert, oriented x3  Pulm: Non-labored breathing  GI/Abd: Soft, NT/ND, no rebound/guarding, dressing is clean and dry, packing removed and not replaced    MEDICATIONS (STANDING): acetaminophen   Tablet .. 975 milliGRAM(s) Oral every 6 hours  DULoxetine 60 milliGRAM(s) Oral daily  enoxaparin Injectable 30 milliGRAM(s) SubCutaneous daily    MEDICATIONS (PRN):oxyCODONE    IR 5 milliGRAM(s) Oral every 6 hours PRN Moderate Pain (4 - 6)  oxyCODONE    IR 10 milliGRAM(s) Oral every 6 hours PRN Severe Pain (7 - 10)    LABS  CBC (12-06 @ 07:47)                              9.4<L>                         7.30    )----------------(  239        62.7  % Neutrophils, 26.3  % Lymphocytes, ANC: 4.58                                29.1<L>    BMP (12-06 @ 17:15)             --      |  --      |  --    		Ca++ --      Ca --                 ---------------------------------( --    		Mg --                 --      |  --      |  --    			Ph 2.0<L>  BMP (12-06 @ 07:08)             138     |  109<H>  |  5<L>  		Ca++ --      Ca 7.7<L>             ---------------------------------( 98    		Mg 2.2                3.9     |  19<L>   |  0.47<L>			Ph 1.5<L>

## 2018-12-07 NOTE — PROGRESS NOTE ADULT - ASSESSMENT
33F with h/o Diverticulitis of large intestine without perforation or abscess POD3 s/p Laparoscopic assisted low anterior resection of colon and rigid sigmoidoscopy with ROBF    - Diet: continue LRD for lunch  - OOB as tolerated  - F/U AM labs  - Pain control as needed, switch to oral pain medication + toradol  - DVT ppx  - dc planning for later today

## 2018-12-14 ENCOUNTER — APPOINTMENT (OUTPATIENT)
Dept: COLORECTAL SURGERY | Facility: CLINIC | Age: 33
End: 2018-12-14
Payer: COMMERCIAL

## 2018-12-14 VITALS — TEMPERATURE: 97.4 F

## 2018-12-14 PROCEDURE — 99024 POSTOP FOLLOW-UP VISIT: CPT

## 2019-01-12 ENCOUNTER — MEDICATION RENEWAL (OUTPATIENT)
Age: 34
End: 2019-01-12

## 2019-01-14 ENCOUNTER — APPOINTMENT (OUTPATIENT)
Dept: COLORECTAL SURGERY | Facility: CLINIC | Age: 34
End: 2019-01-14
Payer: COMMERCIAL

## 2019-01-14 PROCEDURE — 45330 DIAGNOSTIC SIGMOIDOSCOPY: CPT | Mod: 58

## 2019-02-11 ENCOUNTER — MEDICATION RENEWAL (OUTPATIENT)
Age: 34
End: 2019-02-11

## 2019-02-14 ENCOUNTER — MEDICATION RENEWAL (OUTPATIENT)
Age: 34
End: 2019-02-14

## 2019-02-16 ENCOUNTER — MEDICATION RENEWAL (OUTPATIENT)
Age: 34
End: 2019-02-16

## 2019-02-20 ENCOUNTER — APPOINTMENT (OUTPATIENT)
Dept: INTERNAL MEDICINE | Facility: CLINIC | Age: 34
End: 2019-02-20
Payer: COMMERCIAL

## 2019-02-20 VITALS — HEIGHT: 64 IN | WEIGHT: 206 LBS | BODY MASS INDEX: 35.17 KG/M2

## 2019-02-20 VITALS — RESPIRATION RATE: 14 BRPM | SYSTOLIC BLOOD PRESSURE: 120 MMHG | HEART RATE: 72 BPM | DIASTOLIC BLOOD PRESSURE: 76 MMHG

## 2019-02-20 PROCEDURE — 36415 COLL VENOUS BLD VENIPUNCTURE: CPT

## 2019-02-20 PROCEDURE — 99395 PREV VISIT EST AGE 18-39: CPT | Mod: 25

## 2019-02-20 PROCEDURE — 99214 OFFICE O/P EST MOD 30 MIN: CPT | Mod: 25

## 2019-02-20 PROCEDURE — 94010 BREATHING CAPACITY TEST: CPT

## 2019-02-20 RX ORDER — NEOMYCIN SULFATE 500 MG/1
500 TABLET ORAL
Qty: 6 | Refills: 0 | Status: DISCONTINUED | COMMUNITY
Start: 2018-11-01 | End: 2019-02-20

## 2019-02-20 RX ORDER — CLONAZEPAM 0.5 MG/1
0.5 TABLET ORAL
Qty: 30 | Refills: 0 | Status: DISCONTINUED | COMMUNITY
Start: 2018-08-17 | End: 2019-02-20

## 2019-02-20 RX ORDER — METRONIDAZOLE 500 MG/1
500 TABLET ORAL
Qty: 3 | Refills: 0 | Status: DISCONTINUED | COMMUNITY
Start: 2018-11-01 | End: 2019-02-20

## 2019-02-20 RX ORDER — OSELTAMIVIR PHOSPHATE 75 MG/1
75 CAPSULE ORAL
Qty: 10 | Refills: 0 | Status: DISCONTINUED | COMMUNITY
Start: 2019-02-11 | End: 2019-02-20

## 2019-02-20 NOTE — HEALTH RISK ASSESSMENT
[Excellent] : ~his/her~  mood as  excellent [] : No [No falls in past year] : Patient reported no falls in the past year [0] : 2) Feeling down, depressed, or hopeless: Not at all (0) [HIV test declined] : HIV test declined [Hepatitis C test declined] : Hepatitis C test declined [Fully functional (bathing, dressing, toileting, transferring, walking, feeding)] : Fully functional (bathing, dressing, toileting, transferring, walking, feeding) [Fully functional (using the telephone, shopping, preparing meals, housekeeping, doing laundry, using] : Fully functional and needs no help or supervision to perform IADLs (using the telephone, shopping, preparing meals, housekeeping, doing laundry, using transportation, managing medications and managing finances) [Reports changes in hearing] : Reports no changes in hearing [Reports changes in vision] : Reports no changes in vision [Reports normal functional visual acuity (ie: able to read med bottle)] : Reports normal functional visual acuity

## 2019-02-20 NOTE — ASSESSMENT
[FreeTextEntry1] : Blood work was drawn and sent to the lab today. The patient has been instructed to call the office next week to discuss today's lab work.\par \par Attempt to decrease ambien.\par \par F/U 3-4 months to reassess Cymbalta\par \par Optho eval\par ENT eval for decreased hearing\par \par Routine OBGYN evaluation \par \par Routine health counselling provided\par Weight loss / increase exercise\par Low cholesterol diet\par \par Annual CC\par \par F/U 3-4 months to reassess anxiety/insomnia

## 2019-02-20 NOTE — HISTORY OF PRESENT ILLNESS
[Health Maintenance] : health maintenance [___ Year(s) Ago] : [unfilled] year(s) ago [Good] : good [Reg. Dental Visits] : She has regular dental visits [Vision Problems] : She complains of vision problems [Glasses] : wearing glasses [Eye Exam > 1 Year] : an eye examination more than a year ago [Hearing Loss] : She denies hearing loss [Healthy Diet] : She consumes a diverse and healthy diet [Weight Concerns] : She has weight concerns [Regular Exercise] : She exercises regularly [Tobacco Use] : She does not use tobacco [Alcohol Use] : She consumes alcohol [Drug Abuse] : She denies drug use [Premenopausal] : the patient is premenopausal [Reviewed and Updated] : risk screening reviewed and updated [Up to Date] : up to date [FreeTextEntry1] : Pt presents for her annual physical.\par  ALso here for f/u of depression, obesity, anxiety, s/p surgery 12/18 for recurrent sigmoid diverticulitis.\par She has healed well and is back to work.\par \par Cymbalta working well for her depression/anxiety.\par

## 2019-02-20 NOTE — PHYSICAL EXAM
[General Appearance - Alert] : alert [General Appearance - In No Acute Distress] : in no acute distress [Sclera] : the sclera and conjunctiva were normal [PERRL With Normal Accommodation] : pupils were equal in size, round, and reactive to light [Extraocular Movements] : extraocular movements were intact [Outer Ear] : the ears and nose were normal in appearance [Oropharynx] : the oropharynx was normal [Neck Appearance] : the appearance of the neck was normal [Neck Cervical Mass (___cm)] : no neck mass was observed [Jugular Venous Distention Increased] : there was no jugular-venous distention [Thyroid Diffuse Enlargement] : the thyroid was not enlarged [Thyroid Nodule] : there were no palpable thyroid nodules [Auscultation Breath Sounds / Voice Sounds] : lungs were clear to auscultation bilaterally [Heart Rate And Rhythm] : heart rate was normal and rhythm regular [Heart Sounds] : normal S1 and S2 [Heart Sounds Gallop] : no gallops [Murmurs] : no murmurs [Heart Sounds Pericardial Friction Rub] : no pericardial rub [Full Pulse] : the pedal pulses are present [Edema] : there was no peripheral edema [Bowel Sounds] : normal bowel sounds [Abdomen Soft] : soft [Abdomen Tenderness] : non-tender [Abdomen Mass (___ Cm)] : no abdominal mass palpated [Patient Refused] : rectal exam was refused by the patient [FreeTextEntry1] : ALKA Acevedo last PAP 2/17 neg [Cervical Lymph Nodes Enlarged Posterior Bilaterally] : posterior cervical [Cervical Lymph Nodes Enlarged Anterior Bilaterally] : anterior cervical [Supraclavicular Lymph Nodes Enlarged Bilaterally] : supraclavicular [No CVA Tenderness] : no ~M costovertebral angle tenderness [No Spinal Tenderness] : no spinal tenderness [Abnormal Walk] : normal gait [Nail Clubbing] : no clubbing  or cyanosis of the fingernails [Musculoskeletal - Swelling] : no joint swelling seen [Motor Tone] : muscle strength and tone were normal [Skin Color & Pigmentation] : normal skin color and pigmentation [Skin Turgor] : normal skin turgor [] : no rash [Deep Tendon Reflexes (DTR)] : deep tendon reflexes were 2+ and symmetric [Sensation] : the sensory exam was normal to light touch and pinprick [No Focal Deficits] : no focal deficits [Oriented To Time, Place, And Person] : oriented to person, place, and time [Impaired Insight] : insight and judgment were intact [Affect] : the affect was normal

## 2019-02-21 ENCOUNTER — TRANSCRIPTION ENCOUNTER (OUTPATIENT)
Age: 34
End: 2019-02-21

## 2019-02-21 LAB
25(OH)D3 SERPL-MCNC: 25.9 NG/ML
APPEARANCE: CLEAR
BASOPHILS # BLD AUTO: 0.04 K/UL
BASOPHILS NFR BLD AUTO: 0.5 %
BILIRUBIN URINE: NEGATIVE
BLOOD URINE: NORMAL
COLOR: YELLOW
EOSINOPHIL # BLD AUTO: 0.13 K/UL
EOSINOPHIL NFR BLD AUTO: 1.6 %
FERRITIN SERPL-MCNC: 9 NG/ML
FOLATE SERPL-MCNC: 6.2 NG/ML
GLUCOSE QUALITATIVE U: NEGATIVE
HBA1C MFR BLD HPLC: 5.3 %
HCT VFR BLD CALC: 38.7 %
HGB BLD-MCNC: 11.8 G/DL
IMM GRANULOCYTES NFR BLD AUTO: 0.3 %
KETONES URINE: NEGATIVE
LEUKOCYTE ESTERASE URINE: NEGATIVE
LYMPHOCYTES # BLD AUTO: 2.05 K/UL
LYMPHOCYTES NFR BLD AUTO: 25.9 %
MAN DIFF?: NORMAL
MCHC RBC-ENTMCNC: 25.3 PG
MCHC RBC-ENTMCNC: 30.5 GM/DL
MCV RBC AUTO: 82.9 FL
MONOCYTES # BLD AUTO: 0.48 K/UL
MONOCYTES NFR BLD AUTO: 6.1 %
NEUTROPHILS # BLD AUTO: 5.19 K/UL
NEUTROPHILS NFR BLD AUTO: 65.6 %
NITRITE URINE: NEGATIVE
PH URINE: 6
PLATELET # BLD AUTO: 298 K/UL
PROTEIN URINE: NORMAL
RBC # BLD: 4.67 M/UL
RBC # FLD: 15.4 %
SPECIFIC GRAVITY URINE: 1.04
T4 FREE SERPL-MCNC: 1.2 NG/DL
T4 SERPL-MCNC: 6.7 UG/DL
TSH SERPL-ACNC: 2.28 UIU/ML
UROBILINOGEN URINE: NORMAL
VIT B12 SERPL-MCNC: 452 PG/ML
WBC # FLD AUTO: 7.91 K/UL

## 2019-02-23 LAB
ALBUMIN SERPL ELPH-MCNC: 4.7 G/DL
ALP BLD-CCNC: 87 U/L
ALT SERPL-CCNC: 16 U/L
ANION GAP SERPL CALC-SCNC: 23 MMOL/L
AST SERPL-CCNC: 18 U/L
BILIRUB SERPL-MCNC: <0.2 MG/DL
BUN SERPL-MCNC: 16 MG/DL
CALCIUM SERPL-MCNC: 9.3 MG/DL
CHLORIDE SERPL-SCNC: 113 MMOL/L
CO2 SERPL-SCNC: 14 MMOL/L
CREAT SERPL-MCNC: 0.67 MG/DL
GLUCOSE SERPL-MCNC: 100 MG/DL
IRON SATN MFR SERPL: 9 %
IRON SERPL-MCNC: 44 UG/DL
MAGNESIUM SERPL-MCNC: 1.9 MG/DL
POTASSIUM SERPL-SCNC: 5 MMOL/L
PROT SERPL-MCNC: 7.9 G/DL
SODIUM SERPL-SCNC: 150 MMOL/L
TIBC SERPL-MCNC: 483 UG/DL
UIBC SERPL-MCNC: 439 UG/DL

## 2019-02-25 LAB
CHOLEST SERPL-MCNC: 203 MG/DL
CHOLEST/HDLC SERPL: 3.2 RATIO
HDLC SERPL-MCNC: 63 MG/DL
LDLC SERPL CALC-MCNC: 122 MG/DL
TRIGL SERPL-MCNC: 91 MG/DL

## 2019-02-27 ENCOUNTER — APPOINTMENT (OUTPATIENT)
Age: 34
End: 2019-02-27
Payer: COMMERCIAL

## 2019-02-27 ENCOUNTER — LABORATORY RESULT (OUTPATIENT)
Age: 34
End: 2019-02-27

## 2019-02-27 PROCEDURE — 36415 COLL VENOUS BLD VENIPUNCTURE: CPT

## 2019-03-01 ENCOUNTER — APPOINTMENT (OUTPATIENT)
Dept: INTERNAL MEDICINE | Facility: CLINIC | Age: 34
End: 2019-03-01
Payer: COMMERCIAL

## 2019-03-01 PROCEDURE — 99213 OFFICE O/P EST LOW 20 MIN: CPT

## 2019-03-07 LAB
ALBUMIN SERPL ELPH-MCNC: 4.4 G/DL
ALP BLD-CCNC: 76 U/L
ALT SERPL-CCNC: 18 U/L
ANION GAP SERPL CALC-SCNC: 14 MMOL/L
AST SERPL-CCNC: 17 U/L
BASOPHILS # BLD AUTO: 0.04 K/UL
BASOPHILS NFR BLD AUTO: 0.5 %
BILIRUB SERPL-MCNC: 0.2 MG/DL
BUN SERPL-MCNC: 18 MG/DL
CALCIUM SERPL-MCNC: 9.7 MG/DL
CHLORIDE SERPL-SCNC: 101 MMOL/L
CO2 SERPL-SCNC: 23 MMOL/L
CREAT SERPL-MCNC: 0.66 MG/DL
EOSINOPHIL # BLD AUTO: 0.27 K/UL
EOSINOPHIL NFR BLD AUTO: 3.6 %
ERYTHROCYTE [SEDIMENTATION RATE] IN BLOOD BY WESTERGREN METHOD: 41 MM/HR
FOLATE SERPL-MCNC: 7.7 NG/ML
FSH SERPL-MCNC: 4.4 IU/L
GLUCOSE SERPL-MCNC: 88 MG/DL
HCT VFR BLD CALC: 40.2 %
HGB BLD-MCNC: 12.5 G/DL
IMM GRANULOCYTES NFR BLD AUTO: 0.3 %
LH SERPL-ACNC: 11.7 IU/L
LYMPHOCYTES # BLD AUTO: 2.6 K/UL
LYMPHOCYTES NFR BLD AUTO: 34.6 %
MAN DIFF?: NORMAL
MCHC RBC-ENTMCNC: 25.4 PG
MCHC RBC-ENTMCNC: 31.1 GM/DL
MCV RBC AUTO: 81.7 FL
MONOCYTES # BLD AUTO: 0.49 K/UL
MONOCYTES NFR BLD AUTO: 6.5 %
NEUTROPHILS # BLD AUTO: 4.1 K/UL
NEUTROPHILS NFR BLD AUTO: 54.5 %
PLATELET # BLD AUTO: 299 K/UL
POTASSIUM SERPL-SCNC: 4.3 MMOL/L
PROLACTIN SERPL-MCNC: 15.4 NG/ML
PROT SERPL-MCNC: 7.6 G/DL
RBC # BLD: 4.92 M/UL
RBC # FLD: 15.6 %
SODIUM SERPL-SCNC: 138 MMOL/L
VIT B12 SERPL-MCNC: 449 PG/ML
WBC # FLD AUTO: 7.52 K/UL

## 2019-03-08 LAB
A ALTERNATA IGE QN: <0.1 KUA/L
A FUMIGATUS IGE QN: <0.1 KUA/L
BERMUDA GRASS IGE QN: <0.1 KUA/L
BOXELDER IGE QN: <0.1 KUA/L
C HERBARUM IGE QN: <0.1 KUA/L
CALIF WALNUT IGE QN: <0.1 KUA/L
CAT DANDER IGE QN: <0.1 KUA/L
CMN PIGWEED IGE QN: <0.1 KUA/L
COMMON RAGWEED IGE QN: <0.1 KUA/L
COTTONWOOD IGE QN: <0.1 KUA/L
D FARINAE IGE QN: 2.34 KUA/L
D PTERONYSS IGE QN: 2.43 KUA/L
DEPRECATED A ALTERNATA IGE RAST QL: 0
DEPRECATED A FUMIGATUS IGE RAST QL: 0
DEPRECATED BERMUDA GRASS IGE RAST QL: 0
DEPRECATED BOXELDER IGE RAST QL: 0
DEPRECATED C HERBARUM IGE RAST QL: 0
DEPRECATED CAT DANDER IGE RAST QL: 0
DEPRECATED COMMON PIGWEED IGE RAST QL: 0
DEPRECATED COMMON RAGWEED IGE RAST QL: 0
DEPRECATED COTTONWOOD IGE RAST QL: 0
DEPRECATED D FARINAE IGE RAST QL: 2
DEPRECATED D PTERONYSS IGE RAST QL: 2
DEPRECATED DOG DANDER IGE RAST QL: 0
DEPRECATED GOOSEFOOT IGE RAST QL: 0
DEPRECATED LONDON PLANE IGE RAST QL: 0
DEPRECATED MUGWORT IGE RAST QL: 0
DEPRECATED P NOTATUM IGE RAST QL: 0
DEPRECATED RED CEDAR IGE RAST QL: 0
DEPRECATED ROACH IGE RAST QL: 3
DEPRECATED SHEEP SORREL IGE RAST QL: 0
DEPRECATED SILVER BIRCH IGE RAST QL: 0
DEPRECATED TIMOTHY IGE RAST QL: 0
DEPRECATED WHITE ASH IGE RAST QL: 0
DEPRECATED WHITE OAK IGE RAST QL: 0
DOG DANDER IGE QN: <0.1 KUA/L
GOOSEFOOT IGE QN: <0.1 KUA/L
LONDON PLANE IGE QN: <0.1 KUA/L
MUGWORT IGE QN: <0.1 KUA/L
MULBERRY (T70) CLASS: 0
MULBERRY (T70) CONC: <0.1 KUA/L
P NOTATUM IGE QN: <0.1 KUA/L
RED CEDAR IGE QN: <0.1 KUA/L
ROACH IGE QN: 5.21 KUA/L
SHEEP SORREL IGE QN: <0.1 KUA/L
SILVER BIRCH IGE QN: <0.1 KUA/L
TIMOTHY IGE QN: <0.1 KUA/L
TREE ALLERG MIX1 IGE QL: 0
WHITE ASH IGE QN: <0.1 KUA/L
WHITE ELM IGE QN: 0
WHITE ELM IGE QN: <0.1 KUA/L
WHITE OAK IGE QN: <0.1 KUA/L

## 2019-03-11 LAB
CLAM IGE QN: 0.16 KUA/L
CODFISH IGE QN: <0.1 KUA/L
CORN IGE QN: <0.1 KUA/L
COW MILK IGE QN: 0.75 KUA/L
DEPRECATED CLAM IGE RAST QL: NORMAL
DEPRECATED CODFISH IGE RAST QL: 0
DEPRECATED CORN IGE RAST QL: 0
DEPRECATED COW MILK IGE RAST QL: 2
DEPRECATED EGG WHITE IGE RAST QL: NORMAL
DEPRECATED PEANUT IGE RAST QL: 0
DEPRECATED SCALLOP IGE RAST QL: 0.18 KUA/L
DEPRECATED SESAME SEED IGE RAST QL: 0
DEPRECATED SHRIMP IGE RAST QL: 3
DEPRECATED SOYBEAN IGE RAST QL: 0
DEPRECATED WALNUT IGE RAST QL: 0
DEPRECATED WHEAT IGE RAST QL: NORMAL
EGG WHITE IGE QN: 0.24 KUA/L
PEANUT IGE QN: <0.1 KUA/L
SCALLOP IGE QN: 3.88 KUA/L
SCALLOP IGE QN: NORMAL
SESAME SEED IGE QN: <0.1 KUA/L
SOYBEAN IGE QN: <0.1 KUA/L
WALNUT IGE QN: <0.1 KUA/L
WHEAT IGE QN: 0.3 KUA/L

## 2019-03-25 ENCOUNTER — MEDICATION RENEWAL (OUTPATIENT)
Age: 34
End: 2019-03-25

## 2019-04-26 ENCOUNTER — MEDICATION RENEWAL (OUTPATIENT)
Age: 34
End: 2019-04-26

## 2019-05-30 ENCOUNTER — MEDICATION RENEWAL (OUTPATIENT)
Age: 34
End: 2019-05-30

## 2019-06-04 ENCOUNTER — FORM ENCOUNTER (OUTPATIENT)
Age: 34
End: 2019-06-04

## 2019-06-05 ENCOUNTER — APPOINTMENT (OUTPATIENT)
Dept: RADIOLOGY | Facility: CLINIC | Age: 34
End: 2019-06-05
Payer: COMMERCIAL

## 2019-06-05 ENCOUNTER — OUTPATIENT (OUTPATIENT)
Dept: OUTPATIENT SERVICES | Facility: HOSPITAL | Age: 34
LOS: 1 days | End: 2019-06-05
Payer: COMMERCIAL

## 2019-06-05 DIAGNOSIS — Z98.51 TUBAL LIGATION STATUS: Chronic | ICD-10-CM

## 2019-06-05 DIAGNOSIS — S93.609A UNSPECIFIED SPRAIN OF UNSPECIFIED FOOT, INITIAL ENCOUNTER: ICD-10-CM

## 2019-06-05 PROCEDURE — 73630 X-RAY EXAM OF FOOT: CPT

## 2019-06-05 PROCEDURE — 73630 X-RAY EXAM OF FOOT: CPT | Mod: 26,RT

## 2019-06-06 ENCOUNTER — APPOINTMENT (OUTPATIENT)
Dept: INTERNAL MEDICINE | Facility: CLINIC | Age: 34
End: 2019-06-06
Payer: COMMERCIAL

## 2019-06-06 PROCEDURE — 99213 OFFICE O/P EST LOW 20 MIN: CPT

## 2019-06-07 NOTE — PHYSICAL EXAM
[Normal Sclera/Conjunctiva] : normal sclera/conjunctiva [No Respiratory Distress] : no respiratory distress  [Supple] : supple [de-identified] : tenderness medial arch of right foot, with mild swelling. NO erythema [Regular Rhythm] : with a regular rhythm [Clear to Auscultation] : lungs were clear to auscultation bilaterally [Normal Rate] : normal rate

## 2019-06-07 NOTE — HISTORY OF PRESENT ILLNESS
[FreeTextEntry8] : Pt presents with 4 days of right medial foot pain.\par Pt has been exercising more than normal, doing 15-20K steps daily.\par No specific trauma.\par No erythema/ warmth.

## 2019-06-07 NOTE — ASSESSMENT
[FreeTextEntry1] : Xray negative\par \par Medrol Kvng\par Heating pad\par Advised patient to limit walking excessively for the next week, and to restart her exercise program when her pain has resolved. She should also restart at a slower pace to avoid repeat injury.\par

## 2019-06-09 ENCOUNTER — APPOINTMENT (OUTPATIENT)
Dept: ORTHOPEDIC SURGERY | Facility: CLINIC | Age: 34
End: 2019-06-09
Payer: COMMERCIAL

## 2019-06-09 VITALS — SYSTOLIC BLOOD PRESSURE: 118 MMHG | DIASTOLIC BLOOD PRESSURE: 71 MMHG | HEART RATE: 80 BPM

## 2019-06-09 DIAGNOSIS — Z01.818 ENCOUNTER FOR OTHER PREPROCEDURAL EXAMINATION: ICD-10-CM

## 2019-06-09 DIAGNOSIS — E87.0 HYPEROSMOLALITY AND HYPERNATREMIA: ICD-10-CM

## 2019-06-09 DIAGNOSIS — Z86.19 PERSONAL HISTORY OF OTHER INFECTIOUS AND PARASITIC DISEASES: ICD-10-CM

## 2019-06-09 DIAGNOSIS — Z87.19 PERSONAL HISTORY OF OTHER DISEASES OF THE DIGESTIVE SYSTEM: ICD-10-CM

## 2019-06-09 DIAGNOSIS — K57.32 DIVERTICULITIS OF LARGE INTESTINE W/OUT PERFORATION OR ABSCESS W/OUT BLEEDING: ICD-10-CM

## 2019-06-09 DIAGNOSIS — K57.31 DIVERTICULOSIS OF LARGE INTESTINE W/OUT PERFORATION OR ABSCESS WITH BLEEDING: ICD-10-CM

## 2019-06-09 DIAGNOSIS — Z20.828 CONTACT WITH AND (SUSPECTED) EXPOSURE TO OTHER VIRAL COMMUNICABLE DISEASES: ICD-10-CM

## 2019-06-09 DIAGNOSIS — Z87.898 PERSONAL HISTORY OF OTHER SPECIFIED CONDITIONS: ICD-10-CM

## 2019-06-09 DIAGNOSIS — Z87.39 PERSONAL HISTORY OF OTHER DISEASES OF THE MUSCULOSKELETAL SYSTEM AND CONNECTIVE TISSUE: ICD-10-CM

## 2019-06-09 DIAGNOSIS — M53.3 SACROCOCCYGEAL DISORDERS, NOT ELSEWHERE CLASSIFIED: ICD-10-CM

## 2019-06-09 DIAGNOSIS — M54.17 RADICULOPATHY, LUMBOSACRAL REGION: ICD-10-CM

## 2019-06-09 DIAGNOSIS — S93.491A SPRAIN OF OTHER LIGAMENT OF RIGHT ANKLE, INITIAL ENCOUNTER: ICD-10-CM

## 2019-06-09 DIAGNOSIS — K57.90 DIVERTICULOSIS OF INTESTINE, PART UNSPECIFIED, W/OUT PERFORATION OR ABSCESS W/OUT BLEEDING: ICD-10-CM

## 2019-06-09 DIAGNOSIS — Z86.010 PERSONAL HISTORY OF COLONIC POLYPS: ICD-10-CM

## 2019-06-09 DIAGNOSIS — M79.671 PAIN IN RIGHT FOOT: ICD-10-CM

## 2019-06-09 PROCEDURE — 99213 OFFICE O/P EST LOW 20 MIN: CPT

## 2019-06-09 RX ORDER — TRIAMCINOLONE ACETONIDE 0.25 MG/G
0.03 CREAM TOPICAL
Qty: 15 | Refills: 0 | Status: DISCONTINUED | COMMUNITY
Start: 2019-01-04

## 2019-06-09 RX ORDER — ALBUTEROL SULFATE 90 UG/1
108 (90 BASE) AEROSOL, METERED RESPIRATORY (INHALATION) EVERY 4 HOURS
Qty: 1 | Refills: 1 | Status: DISCONTINUED | COMMUNITY
Start: 2018-11-19 | End: 2019-06-09

## 2019-06-09 RX ORDER — CICLOPIROX 80 MG/ML
8 SOLUTION TOPICAL
Qty: 1 | Refills: 1 | Status: DISCONTINUED | COMMUNITY
Start: 2018-07-05 | End: 2019-06-09

## 2019-06-09 RX ORDER — TRAMADOL HYDROCHLORIDE 50 MG/1
50 TABLET, COATED ORAL
Qty: 30 | Refills: 0 | Status: DISCONTINUED | COMMUNITY
Start: 2018-03-22 | End: 2019-06-09

## 2019-06-09 NOTE — DISCUSSION/SUMMARY
[Medication Risks Reviewed] : Medication risks reviewed [de-identified] : Discussed patient's anatomy and probability of overuse. She was advised to stop the medrol since it isn't helping and change to diclofenac. She will ice. She was put into a cam boot. She will avoid irritating activities and modify her exercise regimen.  She was advised that I could not rule out a stress fracture, but that the boot would be protective for her. She will follow up with Dr. Elder in 2 weeks.

## 2019-06-09 NOTE — PHYSICAL EXAM
[Normal RLE] : Right Lower Extremity: No scars, rashes, lesions, ulcers, skin intact [Normal LLE] : Left Lower Extremity: No scars, rashes, lesions, ulcers, skin intact [Slightly Antalgic] : slightly antalgic [Normal] : Alert and in no acute distress [de-identified] : Right Foot/ankle:\par splay forefoot deformity with early hammer toes, calcaneum varum, arch well maintained\par full ROM\par Pain with passive dorsiflexion, mild pain with resistive plantar flexion\par 5/5 motor with dorsiflexion, plantarflexion, inversion, eversion, flexion & extension of all toes\par + marked tenderness at medial heel\par + marked tenderness over tibialis posterior tendon\par equivocal tinel sign at posterior tibial nerve\par Unable to single leg heel raise\par Sensation intact to LT entire foot and ankle\par brisk capillary refill all digits\par \par Left foot/ankle:\par + splay forefoot, calcaneal varum, arch well maintained\par NT to palpation\par FROM without pain\par 5/5 motor\par able to single leg heel raise\par sensation intact to light touch\par brisk capillary refill all digits\par \par  [de-identified] : Normal rate, no increased respiratory effort, no use of accessory muscles, no nasal flaring\par  [de-identified] : no swelling, no edema, skin warm and well-perfused  [de-identified] : Foot x-rays reviewed\par INTERPRETATION: CLINICAL INDICATION: right foot pain \par \par EXAM: \par Frontal, oblique, and lateral right foot from 6/5/2019 at 1059. Compared to \par prior study from 6/8/2014. \par \par IMPRESSION: \par No fractures or dislocations. \par \par Tarsometatarsal alignment maintained without evidence for a Lisfranc injury. \par \par Congenitally fused 5th DIP joint. Preserved remaining visualized joint \par spaces and no joint margin erosions. \par \par Developmental bipartite medial hallux sesamoid. \par \par No lytic or blastic lesions. \par \par

## 2019-06-09 NOTE — HISTORY OF PRESENT ILLNESS
[de-identified] : This is a 34 y.o. female who presents with c/o right foot pain x 3 weeks, worsening over the last week. She admits that she has been doing excessive walking for the [facility] challenge. She had x-rays taken 3 days ago at [facility], which have been reviewed.  Patient states that the pain is along her arch and radiates to just below the ankle on the anteromedial aspect.  It throbs.  There is associated numbness.  She has pain intermittently with and without weight bearing. It is awakening her from sleep. Sometimes putting pressure on the foot helps. She was put on a medrol dose pack. It hasn't helped whatsoever.  Patient gives history of WARNER x 3 in 2017, but denies h/o disc herniation. MRI findings of LS spine in EMR from 2017 are negative. She currently has not complaints of back pain or radiation of pain from her back.\par \par

## 2019-06-16 ENCOUNTER — MEDICATION RENEWAL (OUTPATIENT)
Age: 34
End: 2019-06-16

## 2019-06-18 ENCOUNTER — MEDICATION RENEWAL (OUTPATIENT)
Age: 34
End: 2019-06-18

## 2019-06-19 ENCOUNTER — APPOINTMENT (OUTPATIENT)
Dept: INTERNAL MEDICINE | Facility: CLINIC | Age: 34
End: 2019-06-19
Payer: COMMERCIAL

## 2019-06-19 VITALS
DIASTOLIC BLOOD PRESSURE: 70 MMHG | SYSTOLIC BLOOD PRESSURE: 120 MMHG | WEIGHT: 213 LBS | BODY MASS INDEX: 39.2 KG/M2 | HEART RATE: 78 BPM | HEIGHT: 62 IN | RESPIRATION RATE: 14 BRPM

## 2019-06-19 DIAGNOSIS — M76.821 POSTERIOR TIBIAL TENDINITIS, RIGHT LEG: ICD-10-CM

## 2019-06-19 PROCEDURE — 99214 OFFICE O/P EST MOD 30 MIN: CPT

## 2019-06-19 RX ORDER — LIRAGLUTIDE 6 MG/ML
18 INJECTION SUBCUTANEOUS DAILY
Qty: 1 | Refills: 1 | Status: DISCONTINUED | COMMUNITY
Start: 2019-06-16 | End: 2019-06-19

## 2019-06-19 RX ORDER — METHYLPREDNISOLONE 4 MG/1
4 TABLET ORAL
Qty: 1 | Refills: 0 | Status: DISCONTINUED | COMMUNITY
Start: 2019-06-06 | End: 2019-06-19

## 2019-06-24 ENCOUNTER — MEDICATION RENEWAL (OUTPATIENT)
Age: 34
End: 2019-06-24

## 2019-06-24 NOTE — PHYSICAL EXAM
[Normal Sclera/Conjunctiva] : normal sclera/conjunctiva [Supple] : supple [No Respiratory Distress] : no respiratory distress  [Clear to Auscultation] : lungs were clear to auscultation bilaterally [Normal Rate] : normal rate  [Regular Rhythm] : with a regular rhythm [de-identified] : tenderness medial arch of right foot, with mild swelling. NO erythema

## 2019-06-24 NOTE — ASSESSMENT
[FreeTextEntry1] : Continue diet and exercise.\par Advised an 1800 elsie diet\par \par Will try Saxenda for now.\par Repeat weight / bw in one month\par \par Orthopedic f/u for foot tendonitis.

## 2019-07-03 ENCOUNTER — APPOINTMENT (OUTPATIENT)
Dept: ORTHOPEDIC SURGERY | Facility: CLINIC | Age: 34
End: 2019-07-03
Payer: COMMERCIAL

## 2019-07-03 VITALS
WEIGHT: 213 LBS | HEIGHT: 62 IN | BODY MASS INDEX: 39.2 KG/M2 | HEART RATE: 76 BPM | DIASTOLIC BLOOD PRESSURE: 78 MMHG | SYSTOLIC BLOOD PRESSURE: 116 MMHG

## 2019-07-03 DIAGNOSIS — M21.41 FLAT FOOT [PES PLANUS] (ACQUIRED), RIGHT FOOT: ICD-10-CM

## 2019-07-03 DIAGNOSIS — M72.2 PLANTAR FASCIAL FIBROMATOSIS: ICD-10-CM

## 2019-07-03 DIAGNOSIS — M77.9 ENTHESOPATHY, UNSPECIFIED: ICD-10-CM

## 2019-07-03 DIAGNOSIS — M21.42 FLAT FOOT [PES PLANUS] (ACQUIRED), RIGHT FOOT: ICD-10-CM

## 2019-07-03 DIAGNOSIS — M21.6X9 OTHER ACQUIRED DEFORMITIES OF UNSPECIFIED FOOT: ICD-10-CM

## 2019-07-03 PROCEDURE — 73630 X-RAY EXAM OF FOOT: CPT | Mod: RT

## 2019-07-03 PROCEDURE — 99214 OFFICE O/P EST MOD 30 MIN: CPT

## 2019-07-06 ENCOUNTER — MEDICATION RENEWAL (OUTPATIENT)
Age: 34
End: 2019-07-06

## 2019-07-06 ENCOUNTER — RX RENEWAL (OUTPATIENT)
Age: 34
End: 2019-07-06

## 2019-07-17 ENCOUNTER — APPOINTMENT (OUTPATIENT)
Dept: INTERNAL MEDICINE | Facility: CLINIC | Age: 34
End: 2019-07-17
Payer: COMMERCIAL

## 2019-07-17 PROCEDURE — 36415 COLL VENOUS BLD VENIPUNCTURE: CPT

## 2019-07-19 ENCOUNTER — APPOINTMENT (OUTPATIENT)
Dept: INTERNAL MEDICINE | Facility: CLINIC | Age: 34
End: 2019-07-19
Payer: COMMERCIAL

## 2019-07-19 VITALS
DIASTOLIC BLOOD PRESSURE: 80 MMHG | HEART RATE: 72 BPM | WEIGHT: 205 LBS | RESPIRATION RATE: 14 BRPM | SYSTOLIC BLOOD PRESSURE: 120 MMHG | BODY MASS INDEX: 37.5 KG/M2

## 2019-07-19 DIAGNOSIS — M72.2 PLANTAR FASCIAL FIBROMATOSIS: ICD-10-CM

## 2019-07-19 LAB
ALBUMIN SERPL ELPH-MCNC: 4.6 G/DL
ALP BLD-CCNC: 70 U/L
ALT SERPL-CCNC: 15 U/L
ANION GAP SERPL CALC-SCNC: 14 MMOL/L
AST SERPL-CCNC: 35 U/L
BASOPHILS # BLD AUTO: 0.03 K/UL
BASOPHILS NFR BLD AUTO: 0.5 %
BILIRUB SERPL-MCNC: 0.4 MG/DL
BUN SERPL-MCNC: 13 MG/DL
CALCIUM SERPL-MCNC: 9.3 MG/DL
CHLORIDE SERPL-SCNC: 104 MMOL/L
CO2 SERPL-SCNC: 22 MMOL/L
CREAT SERPL-MCNC: 0.68 MG/DL
EOSINOPHIL # BLD AUTO: 0.07 K/UL
EOSINOPHIL NFR BLD AUTO: 1.1 %
ERYTHROCYTE [SEDIMENTATION RATE] IN BLOOD BY WESTERGREN METHOD: 28 MM/HR
ESTIMATED AVERAGE GLUCOSE: 94 MG/DL
GLUCOSE SERPL-MCNC: 92 MG/DL
HBA1C MFR BLD HPLC: 4.9 %
HCT VFR BLD CALC: 38.3 %
HGB BLD-MCNC: 12.2 G/DL
IMM GRANULOCYTES NFR BLD AUTO: 0.8 %
LYMPHOCYTES # BLD AUTO: 2 K/UL
LYMPHOCYTES NFR BLD AUTO: 30.2 %
MAN DIFF?: NORMAL
MCHC RBC-ENTMCNC: 26.9 PG
MCHC RBC-ENTMCNC: 31.9 GM/DL
MCV RBC AUTO: 84.4 FL
MONOCYTES # BLD AUTO: 0.41 K/UL
MONOCYTES NFR BLD AUTO: 6.2 %
NEUTROPHILS # BLD AUTO: 4.06 K/UL
NEUTROPHILS NFR BLD AUTO: 61.2 %
PLATELET # BLD AUTO: 289 K/UL
POTASSIUM SERPL-SCNC: 4 MMOL/L
PROT SERPL-MCNC: 7.4 G/DL
RBC # BLD: 4.54 M/UL
RBC # FLD: 14 %
SODIUM SERPL-SCNC: 140 MMOL/L
TSH SERPL-ACNC: 1.15 UIU/ML
WBC # FLD AUTO: 6.62 K/UL

## 2019-07-19 PROCEDURE — 99214 OFFICE O/P EST MOD 30 MIN: CPT

## 2019-07-19 NOTE — HISTORY OF PRESENT ILLNESS
[de-identified] : Pt presents for evaluation -\par Foot is doing a bit better - not using boot anymore.\par Trying to lose weight - has been exercising regularly and eating healthy.\par Has been using Saxenda daily - no side effects.\par \par has been more anxious and depressed lately.\par More withdrawn, short tempered.

## 2019-07-19 NOTE — ASSESSMENT
[FreeTextEntry1] : Labs reviewed\par Continue all meds\par Begin Wellbutrin 150 mg daily\par \par F/U if symptoms do not resolve, or if they should change/worsen\par F/U 3 months

## 2019-08-12 ENCOUNTER — MEDICATION RENEWAL (OUTPATIENT)
Age: 34
End: 2019-08-12

## 2019-08-20 ENCOUNTER — CLINICAL ADVICE (OUTPATIENT)
Age: 34
End: 2019-08-20

## 2019-08-23 ENCOUNTER — MEDICATION RENEWAL (OUTPATIENT)
Age: 34
End: 2019-08-23

## 2019-08-28 ENCOUNTER — MEDICATION RENEWAL (OUTPATIENT)
Age: 34
End: 2019-08-28

## 2019-09-05 ENCOUNTER — APPOINTMENT (OUTPATIENT)
Dept: INTERNAL MEDICINE | Facility: CLINIC | Age: 34
End: 2019-09-05
Payer: COMMERCIAL

## 2019-09-05 ENCOUNTER — MEDICATION RENEWAL (OUTPATIENT)
Age: 34
End: 2019-09-05

## 2019-09-05 DIAGNOSIS — N39.0 URINARY TRACT INFECTION, SITE NOT SPECIFIED: ICD-10-CM

## 2019-09-05 LAB
BILIRUB UR QL STRIP: NEGATIVE
CLARITY UR: NORMAL
COLLECTION METHOD: NORMAL
GLUCOSE UR-MCNC: NEGATIVE
HCG UR QL: 1 EU/DL
HGB UR QL STRIP.AUTO: NORMAL
KETONES UR-MCNC: NEGATIVE
LEUKOCYTE ESTERASE UR QL STRIP: ABNORMAL
NITRITE UR QL STRIP: NEGATIVE
PH UR STRIP: 7.5
PROT UR STRIP-MCNC: ABNORMAL
SP GR UR STRIP: 1.02

## 2019-09-05 PROCEDURE — 81002 URINALYSIS NONAUTO W/O SCOPE: CPT

## 2019-09-09 ENCOUNTER — MEDICATION RENEWAL (OUTPATIENT)
Age: 34
End: 2019-09-09

## 2019-09-09 LAB — BACTERIA UR CULT: ABNORMAL

## 2019-09-11 ENCOUNTER — APPOINTMENT (OUTPATIENT)
Dept: INTERNAL MEDICINE | Facility: CLINIC | Age: 34
End: 2019-09-11
Payer: COMMERCIAL

## 2019-09-11 ENCOUNTER — MEDICATION RENEWAL (OUTPATIENT)
Age: 34
End: 2019-09-11

## 2019-09-11 PROCEDURE — 86580 TB INTRADERMAL TEST: CPT

## 2019-09-13 LAB — BACTERIA UR CULT: NORMAL

## 2019-09-26 ENCOUNTER — MEDICATION RENEWAL (OUTPATIENT)
Age: 34
End: 2019-09-26

## 2019-09-26 DIAGNOSIS — B00.1 HERPESVIRAL VESICULAR DERMATITIS: ICD-10-CM

## 2019-10-02 ENCOUNTER — MEDICATION RENEWAL (OUTPATIENT)
Age: 34
End: 2019-10-02

## 2019-10-10 ENCOUNTER — MEDICATION RENEWAL (OUTPATIENT)
Age: 34
End: 2019-10-10

## 2019-10-18 ENCOUNTER — APPOINTMENT (OUTPATIENT)
Dept: INTERNAL MEDICINE | Facility: CLINIC | Age: 34
End: 2019-10-18
Payer: COMMERCIAL

## 2019-10-18 VITALS
RESPIRATION RATE: 14 BRPM | SYSTOLIC BLOOD PRESSURE: 122 MMHG | DIASTOLIC BLOOD PRESSURE: 80 MMHG | WEIGHT: 195 LBS | BODY MASS INDEX: 33.29 KG/M2 | HEIGHT: 64 IN | HEART RATE: 72 BPM

## 2019-10-18 PROCEDURE — 99214 OFFICE O/P EST MOD 30 MIN: CPT

## 2019-10-18 RX ORDER — NITROFURANTOIN (MONOHYDRATE/MACROCRYSTALS) 25; 75 MG/1; MG/1
100 CAPSULE ORAL
Qty: 14 | Refills: 0 | Status: DISCONTINUED | COMMUNITY
Start: 2019-09-11 | End: 2019-10-18

## 2019-10-18 RX ORDER — SULFAMETHOXAZOLE AND TRIMETHOPRIM 800; 160 MG/1; MG/1
800-160 TABLET ORAL TWICE DAILY
Qty: 14 | Refills: 0 | Status: DISCONTINUED | COMMUNITY
Start: 2019-09-05 | End: 2019-10-18

## 2019-10-18 RX ORDER — PREDNISONE 20 MG/1
20 TABLET ORAL
Qty: 4 | Refills: 0 | Status: DISCONTINUED | COMMUNITY
Start: 2019-07-22 | End: 2019-10-18

## 2019-10-20 NOTE — HISTORY OF PRESENT ILLNESS
[de-identified] : Pt presents for evaluation -\par \par Trying to lose weight - has been exercising regularly and eating healthy.\par Has been using Saxenda daily - no side effects.\par \par feels as if wellbutrin has been helping.

## 2019-10-20 NOTE — ASSESSMENT
[FreeTextEntry1] : \par Continue all meds\par Continue Wellbutrin 150 mg daily\par \par Stop Saxenda.\par begin topamax daily, will titrate to bid in a few weeks.\par \par Restart exercise program

## 2019-11-16 ENCOUNTER — MEDICATION RENEWAL (OUTPATIENT)
Age: 34
End: 2019-11-16

## 2019-11-16 RX ORDER — BUPROPION HYDROCHLORIDE 150 MG/1
150 TABLET, EXTENDED RELEASE ORAL DAILY
Qty: 90 | Refills: 1 | Status: DISCONTINUED | COMMUNITY
Start: 2019-07-19 | End: 2019-11-16

## 2019-12-05 ENCOUNTER — TRANSCRIPTION ENCOUNTER (OUTPATIENT)
Age: 34
End: 2019-12-05

## 2019-12-05 ENCOUNTER — RX RENEWAL (OUTPATIENT)
Age: 34
End: 2019-12-05

## 2019-12-18 ENCOUNTER — APPOINTMENT (OUTPATIENT)
Dept: INTERNAL MEDICINE | Facility: CLINIC | Age: 34
End: 2019-12-18

## 2019-12-18 ENCOUNTER — MEDICATION RENEWAL (OUTPATIENT)
Age: 34
End: 2019-12-18

## 2019-12-18 DIAGNOSIS — Z86.19 PERSONAL HISTORY OF OTHER INFECTIOUS AND PARASITIC DISEASES: ICD-10-CM

## 2019-12-18 RX ORDER — NYSTATIN 100000 1/G
100000 POWDER TOPICAL TWICE DAILY
Qty: 1 | Refills: 0 | Status: DISCONTINUED | COMMUNITY
Start: 2019-12-18 | End: 2019-12-18

## 2019-12-18 RX ORDER — TRIAMCINOLONE ACETONIDE 1 MG/G
0.1 CREAM TOPICAL TWICE DAILY
Qty: 1 | Refills: 0 | Status: ACTIVE | COMMUNITY
Start: 2019-12-18 | End: 1900-01-01

## 2019-12-27 ENCOUNTER — MEDICATION RENEWAL (OUTPATIENT)
Age: 34
End: 2019-12-27

## 2019-12-30 ENCOUNTER — APPOINTMENT (OUTPATIENT)
Dept: INTERNAL MEDICINE | Facility: CLINIC | Age: 34
End: 2019-12-30
Payer: COMMERCIAL

## 2019-12-30 VITALS — HEART RATE: 72 BPM | RESPIRATION RATE: 14 BRPM | DIASTOLIC BLOOD PRESSURE: 80 MMHG | SYSTOLIC BLOOD PRESSURE: 120 MMHG

## 2019-12-30 VITALS — BODY MASS INDEX: 35.51 KG/M2 | HEIGHT: 64 IN | WEIGHT: 208 LBS

## 2019-12-30 LAB
ALBUMIN SERPL ELPH-MCNC: 4.4 G/DL
ALP BLD-CCNC: 72 U/L
ALT SERPL-CCNC: 22 U/L
ANION GAP SERPL CALC-SCNC: 9 MMOL/L
AST SERPL-CCNC: 21 U/L
BILIRUB SERPL-MCNC: 0.2 MG/DL
BUN SERPL-MCNC: 21 MG/DL
CALCIUM SERPL-MCNC: 9.4 MG/DL
CHLORIDE SERPL-SCNC: 106 MMOL/L
CO2 SERPL-SCNC: 24 MMOL/L
CREAT SERPL-MCNC: 0.64 MG/DL
ESTIMATED AVERAGE GLUCOSE: 100 MG/DL
GLUCOSE SERPL-MCNC: 93 MG/DL
HBA1C MFR BLD HPLC: 5.1 %
POTASSIUM SERPL-SCNC: 4.8 MMOL/L
PROT SERPL-MCNC: 7.3 G/DL
SODIUM SERPL-SCNC: 139 MMOL/L

## 2019-12-30 PROCEDURE — 36415 COLL VENOUS BLD VENIPUNCTURE: CPT

## 2019-12-30 PROCEDURE — 99214 OFFICE O/P EST MOD 30 MIN: CPT | Mod: 25

## 2019-12-30 RX ORDER — CYCLOBENZAPRINE HYDROCHLORIDE 5 MG/1
5 TABLET, FILM COATED ORAL 3 TIMES DAILY
Qty: 30 | Refills: 0 | Status: COMPLETED | COMMUNITY
Start: 2019-12-30 | End: 2020-01-09

## 2019-12-30 NOTE — ASSESSMENT
[FreeTextEntry1] : Blood work was drawn and sent to the lab today. The patient has been instructed to call the office next week to discuss today's lab work.\par \par Prednisone taper\par \par MRI LS spine\par \par FLexeril PRN\par \par TO see spine center.

## 2019-12-30 NOTE — HISTORY OF PRESENT ILLNESS
[FreeTextEntry8] : Pt presents for evaluation of left sided low back pain for one week.\par Pain radiates to left knee.\par No numbness/tingling.\par NO relief with diclofenac/motrin/tylenol.\par

## 2020-01-01 ENCOUNTER — FORM ENCOUNTER (OUTPATIENT)
Age: 35
End: 2020-01-01

## 2020-01-02 ENCOUNTER — APPOINTMENT (OUTPATIENT)
Dept: MRI IMAGING | Facility: CLINIC | Age: 35
End: 2020-01-02
Payer: COMMERCIAL

## 2020-01-02 ENCOUNTER — OUTPATIENT (OUTPATIENT)
Dept: OUTPATIENT SERVICES | Facility: HOSPITAL | Age: 35
LOS: 1 days | End: 2020-01-02
Payer: COMMERCIAL

## 2020-01-02 DIAGNOSIS — Z98.51 TUBAL LIGATION STATUS: Chronic | ICD-10-CM

## 2020-01-02 DIAGNOSIS — M54.5 LOW BACK PAIN: ICD-10-CM

## 2020-01-02 PROCEDURE — 72148 MRI LUMBAR SPINE W/O DYE: CPT

## 2020-01-02 PROCEDURE — 72148 MRI LUMBAR SPINE W/O DYE: CPT | Mod: 26

## 2020-01-05 ENCOUNTER — MEDICATION RENEWAL (OUTPATIENT)
Age: 35
End: 2020-01-05

## 2020-01-07 ENCOUNTER — MEDICATION RENEWAL (OUTPATIENT)
Age: 35
End: 2020-01-07

## 2020-01-09 ENCOUNTER — EMERGENCY (EMERGENCY)
Facility: HOSPITAL | Age: 35
LOS: 1 days | Discharge: HOME | End: 2020-01-09
Attending: EMERGENCY MEDICINE | Admitting: EMERGENCY MEDICINE
Payer: COMMERCIAL

## 2020-01-09 VITALS
DIASTOLIC BLOOD PRESSURE: 85 MMHG | SYSTOLIC BLOOD PRESSURE: 128 MMHG | HEART RATE: 98 BPM | OXYGEN SATURATION: 100 % | RESPIRATION RATE: 16 BRPM

## 2020-01-09 VITALS
DIASTOLIC BLOOD PRESSURE: 93 MMHG | TEMPERATURE: 98 F | SYSTOLIC BLOOD PRESSURE: 140 MMHG | HEART RATE: 117 BPM | RESPIRATION RATE: 20 BRPM | OXYGEN SATURATION: 100 %

## 2020-01-09 DIAGNOSIS — Z98.51 TUBAL LIGATION STATUS: Chronic | ICD-10-CM

## 2020-01-09 LAB
ALBUMIN SERPL ELPH-MCNC: 4.5 G/DL — SIGNIFICANT CHANGE UP (ref 3.3–5)
ALP SERPL-CCNC: 78 U/L — SIGNIFICANT CHANGE UP (ref 40–120)
ALT FLD-CCNC: 22 U/L — SIGNIFICANT CHANGE UP (ref 4–33)
ANION GAP SERPL CALC-SCNC: 13 MMO/L — SIGNIFICANT CHANGE UP (ref 7–14)
APPEARANCE UR: CLEAR — SIGNIFICANT CHANGE UP
APTT BLD: 32 SEC — SIGNIFICANT CHANGE UP (ref 27.5–36.3)
AST SERPL-CCNC: 18 U/L — SIGNIFICANT CHANGE UP (ref 4–32)
BASE EXCESS BLDV CALC-SCNC: 4.3 MMOL/L — SIGNIFICANT CHANGE UP
BASOPHILS # BLD AUTO: 0.08 K/UL — SIGNIFICANT CHANGE UP (ref 0–0.2)
BASOPHILS NFR BLD AUTO: 0.5 % — SIGNIFICANT CHANGE UP (ref 0–2)
BILIRUB SERPL-MCNC: 0.3 MG/DL — SIGNIFICANT CHANGE UP (ref 0.2–1.2)
BILIRUB UR-MCNC: NEGATIVE — SIGNIFICANT CHANGE UP
BLD GP AB SCN SERPL QL: NEGATIVE — SIGNIFICANT CHANGE UP
BLOOD GAS VENOUS - CREATININE: 0.61 MG/DL — SIGNIFICANT CHANGE UP (ref 0.5–1.3)
BLOOD UR QL VISUAL: NEGATIVE — SIGNIFICANT CHANGE UP
BUN SERPL-MCNC: 19 MG/DL — SIGNIFICANT CHANGE UP (ref 7–23)
CALCIUM SERPL-MCNC: 9.7 MG/DL — SIGNIFICANT CHANGE UP (ref 8.4–10.5)
CHLORIDE BLDV-SCNC: 106 MMOL/L — SIGNIFICANT CHANGE UP (ref 96–108)
CHLORIDE SERPL-SCNC: 100 MMOL/L — SIGNIFICANT CHANGE UP (ref 98–107)
CO2 SERPL-SCNC: 28 MMOL/L — SIGNIFICANT CHANGE UP (ref 22–31)
COLOR SPEC: YELLOW — SIGNIFICANT CHANGE UP
CREAT SERPL-MCNC: 0.69 MG/DL — SIGNIFICANT CHANGE UP (ref 0.5–1.3)
EOSINOPHIL # BLD AUTO: 0.15 K/UL — SIGNIFICANT CHANGE UP (ref 0–0.5)
EOSINOPHIL NFR BLD AUTO: 1 % — SIGNIFICANT CHANGE UP (ref 0–6)
GAS PNL BLDV: 137 MMOL/L — SIGNIFICANT CHANGE UP (ref 136–146)
GLUCOSE BLDV-MCNC: 76 MG/DL — SIGNIFICANT CHANGE UP (ref 70–99)
GLUCOSE SERPL-MCNC: 76 MG/DL — SIGNIFICANT CHANGE UP (ref 70–99)
GLUCOSE UR-MCNC: NEGATIVE — SIGNIFICANT CHANGE UP
HCG SERPL-ACNC: < 5 MIU/ML — SIGNIFICANT CHANGE UP
HCO3 BLDV-SCNC: 27 MMOL/L — SIGNIFICANT CHANGE UP (ref 20–27)
HCT VFR BLD CALC: 43.6 % — SIGNIFICANT CHANGE UP (ref 34.5–45)
HCT VFR BLDV CALC: 40.4 % — SIGNIFICANT CHANGE UP (ref 34.5–45)
HGB BLD-MCNC: 14 G/DL — SIGNIFICANT CHANGE UP (ref 11.5–15.5)
HGB BLDV-MCNC: 13.2 G/DL — SIGNIFICANT CHANGE UP (ref 11.5–15.5)
IMM GRANULOCYTES NFR BLD AUTO: 1.4 % — SIGNIFICANT CHANGE UP (ref 0–1.5)
INR BLD: 0.99 — SIGNIFICANT CHANGE UP (ref 0.88–1.17)
KETONES UR-MCNC: NEGATIVE — SIGNIFICANT CHANGE UP
LACTATE BLDV-MCNC: 1.7 MMOL/L — SIGNIFICANT CHANGE UP (ref 0.5–2)
LEUKOCYTE ESTERASE UR-ACNC: NEGATIVE — SIGNIFICANT CHANGE UP
LIDOCAIN IGE QN: 213.9 U/L — HIGH (ref 7–60)
LYMPHOCYTES # BLD AUTO: 29.7 % — SIGNIFICANT CHANGE UP (ref 13–44)
LYMPHOCYTES # BLD AUTO: 4.38 K/UL — HIGH (ref 1–3.3)
MCHC RBC-ENTMCNC: 27.7 PG — SIGNIFICANT CHANGE UP (ref 27–34)
MCHC RBC-ENTMCNC: 32.1 % — SIGNIFICANT CHANGE UP (ref 32–36)
MCV RBC AUTO: 86.3 FL — SIGNIFICANT CHANGE UP (ref 80–100)
MONOCYTES # BLD AUTO: 1.21 K/UL — HIGH (ref 0–0.9)
MONOCYTES NFR BLD AUTO: 8.2 % — SIGNIFICANT CHANGE UP (ref 2–14)
NEUTROPHILS # BLD AUTO: 8.75 K/UL — HIGH (ref 1.8–7.4)
NEUTROPHILS NFR BLD AUTO: 59.2 % — SIGNIFICANT CHANGE UP (ref 43–77)
NITRITE UR-MCNC: NEGATIVE — SIGNIFICANT CHANGE UP
NRBC # FLD: 0 K/UL — SIGNIFICANT CHANGE UP (ref 0–0)
PCO2 BLDV: 50 MMHG — SIGNIFICANT CHANGE UP (ref 41–51)
PH BLDV: 7.38 PH — SIGNIFICANT CHANGE UP (ref 7.32–7.43)
PH UR: 8 — SIGNIFICANT CHANGE UP (ref 5–8)
PLATELET # BLD AUTO: 335 K/UL — SIGNIFICANT CHANGE UP (ref 150–400)
PMV BLD: 9.6 FL — SIGNIFICANT CHANGE UP (ref 7–13)
PO2 BLDV: 33 MMHG — LOW (ref 35–40)
POTASSIUM BLDV-SCNC: 3.4 MMOL/L — SIGNIFICANT CHANGE UP (ref 3.4–4.5)
POTASSIUM SERPL-MCNC: 3.4 MMOL/L — LOW (ref 3.5–5.3)
POTASSIUM SERPL-SCNC: 3.4 MMOL/L — LOW (ref 3.5–5.3)
PROT SERPL-MCNC: 7.9 G/DL — SIGNIFICANT CHANGE UP (ref 6–8.3)
PROT UR-MCNC: 10 — SIGNIFICANT CHANGE UP
PROTHROM AB SERPL-ACNC: 11.3 SEC — SIGNIFICANT CHANGE UP (ref 9.8–13.1)
RBC # BLD: 5.05 M/UL — SIGNIFICANT CHANGE UP (ref 3.8–5.2)
RBC # FLD: 13.3 % — SIGNIFICANT CHANGE UP (ref 10.3–14.5)
RH IG SCN BLD-IMP: POSITIVE — SIGNIFICANT CHANGE UP
SAO2 % BLDV: 59.6 % — LOW (ref 60–85)
SODIUM SERPL-SCNC: 141 MMOL/L — SIGNIFICANT CHANGE UP (ref 135–145)
SP GR SPEC: > 1.04 — HIGH (ref 1–1.04)
TRIGL SERPL-MCNC: 167 MG/DL — HIGH (ref 10–149)
UROBILINOGEN FLD QL: NORMAL — SIGNIFICANT CHANGE UP
WBC # BLD: 14.77 K/UL — HIGH (ref 3.8–10.5)
WBC # FLD AUTO: 14.77 K/UL — HIGH (ref 3.8–10.5)

## 2020-01-09 PROCEDURE — 99285 EMERGENCY DEPT VISIT HI MDM: CPT

## 2020-01-09 PROCEDURE — 74177 CT ABD & PELVIS W/CONTRAST: CPT | Mod: 26

## 2020-01-09 PROCEDURE — 71045 X-RAY EXAM CHEST 1 VIEW: CPT | Mod: 26

## 2020-01-09 PROCEDURE — 76705 ECHO EXAM OF ABDOMEN: CPT | Mod: 26

## 2020-01-09 RX ORDER — MORPHINE SULFATE 50 MG/1
4 CAPSULE, EXTENDED RELEASE ORAL ONCE
Refills: 0 | Status: DISCONTINUED | OUTPATIENT
Start: 2020-01-09 | End: 2020-01-09

## 2020-01-09 RX ORDER — SODIUM CHLORIDE 9 MG/ML
1000 INJECTION, SOLUTION INTRAVENOUS ONCE
Refills: 0 | Status: COMPLETED | OUTPATIENT
Start: 2020-01-09 | End: 2020-01-09

## 2020-01-09 RX ORDER — LIDOCAINE 4 G/100G
15 CREAM TOPICAL ONCE
Refills: 0 | Status: COMPLETED | OUTPATIENT
Start: 2020-01-09 | End: 2020-01-09

## 2020-01-09 RX ORDER — FAMOTIDINE 10 MG/ML
20 INJECTION INTRAVENOUS ONCE
Refills: 0 | Status: COMPLETED | OUTPATIENT
Start: 2020-01-09 | End: 2020-01-09

## 2020-01-09 RX ORDER — ONDANSETRON 8 MG/1
4 TABLET, FILM COATED ORAL ONCE
Refills: 0 | Status: COMPLETED | OUTPATIENT
Start: 2020-01-09 | End: 2020-01-09

## 2020-01-09 RX ADMIN — FAMOTIDINE 20 MILLIGRAM(S): 10 INJECTION INTRAVENOUS at 10:50

## 2020-01-09 RX ADMIN — Medication 10 MILLILITER(S): at 15:03

## 2020-01-09 RX ADMIN — ONDANSETRON 4 MILLIGRAM(S): 8 TABLET, FILM COATED ORAL at 10:33

## 2020-01-09 RX ADMIN — MORPHINE SULFATE 4 MILLIGRAM(S): 50 CAPSULE, EXTENDED RELEASE ORAL at 10:32

## 2020-01-09 RX ADMIN — SODIUM CHLORIDE 1000 MILLILITER(S): 9 INJECTION, SOLUTION INTRAVENOUS at 10:33

## 2020-01-09 RX ADMIN — Medication 30 MILLILITER(S): at 15:02

## 2020-01-09 RX ADMIN — MORPHINE SULFATE 4 MILLIGRAM(S): 50 CAPSULE, EXTENDED RELEASE ORAL at 13:58

## 2020-01-09 RX ADMIN — LIDOCAINE 15 MILLILITER(S): 4 CREAM TOPICAL at 15:03

## 2020-01-09 RX ADMIN — MORPHINE SULFATE 4 MILLIGRAM(S): 50 CAPSULE, EXTENDED RELEASE ORAL at 10:54

## 2020-01-09 RX ADMIN — MORPHINE SULFATE 4 MILLIGRAM(S): 50 CAPSULE, EXTENDED RELEASE ORAL at 13:30

## 2020-01-09 NOTE — ED PROVIDER NOTE - ATTENDING CONTRIBUTION TO CARE
ED Attending (Dr Garcia): I have personally performed a face to face bedside history and physical examination of this patient.  I have discussed the case with the PA and  I have personally authored the following components: HPI, ROS, Physical Exam and MDM in addition to reviewing and revising the rest of the Provider Note. 34 y.o female pmhx of diverticulitis status post colectomy in 2018 coming in with severe crampy/ intermittent epigastric pain started 2 hours prior to arrival, abdomen tender on exam. Will check labs, CT abdomen/pelvis, US Gallblaldder, pain control, antiemetics, hydration, reassessments. Unclear etiology but would assess for monae vs SBO.

## 2020-01-09 NOTE — ED PROVIDER NOTE - OBJECTIVE STATEMENT
34 y.o female pmhx of diverticulitis status post colectomy in 2018 coming in with epigastric pain and lower back pain that started suddenly 2 hours ago- pain has been coming and going she describes as contractions. Pt was driving to work drinking coffee when the pain started, felt nauseous and had an episode of vomiting. Pt is currently on macrobid for UTI, no dysuria or vaginal discharge. Denies any fevers, chills, chest pain, sob, cough, numbness, tingling, weakness, fecal or urinary incontinence, saddle anesthesia. Pt no longer menstruating. No smoking, ETOH or drug use. 34 y.o female pmhx of diverticulitis status post colectomy in 2018 coming in with epigastric pain and lower back pain that started suddenly 2 hours ago- pain has been coming and going she describes as contractions. Pt was driving to work drinking coffee when the pain started, felt nauseous and had an episode of vomiting. Pt is currently on macrobid for UTI, no dysuria or vaginal discharge. Denies any fevers, chills, chest pain, sob, cough, numbness, tingling, weakness, fecal or urinary incontinence, saddle anesthesia. Pt no longer menstruating. No smoking, ETOH or drug use.  Appears extremely uncomfortable upon arrival.

## 2020-01-09 NOTE — ED PROVIDER NOTE - PATIENT PORTAL LINK FT
You can access the FollowMyHealth Patient Portal offered by [facility] by registering at the following website: [email or web address]. By joining [employer]’s FollowMyHealth portal, you will also be able to view your health information using other applications (apps) compatible with our system.

## 2020-01-09 NOTE — ED PROVIDER NOTE - CLINICAL SUMMARY MEDICAL DECISION MAKING FREE TEXT BOX
34 y.o female pmhx of diverticulitis status post colectomy in 2018 coming in with crampy/ intermittent epigastric pain started 2 hours prior to arrival, abdomen tender on exam, . Will check labs, CT abdomen/pelvis, US Gallbaldder, pain control, antiemetics, hydration, reassessments. 34 y.o female pmhx of diverticulitis status post colectomy in 2018 coming in with crampy/ intermittent epigastric pain started 2 hours prior to arrival, abdomen tender on exam, . Will check labs, CT abdomen/pelvis, US Gallblaldder, pain control, antiemetics, hydration, reassessments. 34 y.o female pmhx of diverticulitis status post colectomy in 2018 coming in with severe crampy/ intermittent epigastric pain started 2 hours prior to arrival, abdomen tender on exam. Will check labs, CT abdomen/pelvis, US Gallblaldder, pain control, antiemetics, hydration, reassessments. Unclear etiology but would assess for monae vs SBO, also possibly AGE.

## 2020-01-09 NOTE — ED ADULT NURSE NOTE - OBJECTIVE STATEMENT
Sergio RN: 35 yo female, pmh colectomy s/p diverticulitis 2014, csection, with chronic lumbar pain, c/o sudden onset midepigastric pain as of this morning, with associated n/v. pt tender to URQ and LQU. pt last BM this morning. Pt on day 3 of Macrobid for UTI. pt reports SOB with pain. pt denies recent fever, headache, chest pain, urinary symptoms. MD at bedside for evaluation. 20g iv insert to right ac, labs sent. handoff report given to primary RN

## 2020-01-09 NOTE — ED PROVIDER NOTE - PROGRESS NOTE DETAILS
JULITA Pérez: pt reports feeling much better. US and CT no acute abnormalities. Slight elevation in lipase. discussed all results with patient. feeling well and would like to go home. she has been tolerating po intake without issue. Pain free at this time. abdomen nontender. strict return precautions given, expressed understanding, ready for dc.

## 2020-01-09 NOTE — ED ADULT TRIAGE NOTE - CHIEF COMPLAINT QUOTE
pt doubled over in pain, reports severe periumbilical abd  pain radiating to her back sudden onset 2 hours ago. pt reports hx of herniated discs in lumbar spine however back pain today is much worse as her normal back pain is dull. pt also reports hx of diverticulitis resulting in partial colectomy and has not had issues since.

## 2020-01-10 ENCOUNTER — APPOINTMENT (OUTPATIENT)
Dept: INTERNAL MEDICINE | Facility: CLINIC | Age: 35
End: 2020-01-10
Payer: COMMERCIAL

## 2020-01-10 LAB
BACTERIA UR CULT: SIGNIFICANT CHANGE UP
SPECIMEN SOURCE: SIGNIFICANT CHANGE UP

## 2020-01-10 PROCEDURE — 99214 OFFICE O/P EST MOD 30 MIN: CPT

## 2020-01-11 ENCOUNTER — APPOINTMENT (OUTPATIENT)
Dept: MRI IMAGING | Facility: CLINIC | Age: 35
End: 2020-01-11
Payer: COMMERCIAL

## 2020-01-11 ENCOUNTER — OUTPATIENT (OUTPATIENT)
Dept: OUTPATIENT SERVICES | Facility: HOSPITAL | Age: 35
LOS: 1 days | End: 2020-01-11
Payer: COMMERCIAL

## 2020-01-11 DIAGNOSIS — Z98.51 TUBAL LIGATION STATUS: Chronic | ICD-10-CM

## 2020-01-11 DIAGNOSIS — Z00.8 ENCOUNTER FOR OTHER GENERAL EXAMINATION: ICD-10-CM

## 2020-01-11 PROCEDURE — 72146 MRI CHEST SPINE W/O DYE: CPT

## 2020-01-11 PROCEDURE — 72146 MRI CHEST SPINE W/O DYE: CPT | Mod: 26

## 2020-01-13 LAB
APPEARANCE: CLEAR
BILIRUBIN URINE: NEGATIVE
BLOOD URINE: NEGATIVE
COLOR: NORMAL
GLUCOSE QUALITATIVE U: NEGATIVE
KETONES URINE: NEGATIVE
LEUKOCYTE ESTERASE URINE: NEGATIVE
NITRITE URINE: NEGATIVE
PH URINE: 7.5
PROTEIN URINE: NEGATIVE
SPECIFIC GRAVITY URINE: 1.01
UROBILINOGEN URINE: NORMAL

## 2020-01-17 ENCOUNTER — APPOINTMENT (OUTPATIENT)
Dept: INTERNAL MEDICINE | Facility: CLINIC | Age: 35
End: 2020-01-17
Payer: COMMERCIAL

## 2020-01-17 VITALS — SYSTOLIC BLOOD PRESSURE: 122 MMHG | RESPIRATION RATE: 14 BRPM | HEART RATE: 72 BPM | DIASTOLIC BLOOD PRESSURE: 80 MMHG

## 2020-01-17 PROCEDURE — 36415 COLL VENOUS BLD VENIPUNCTURE: CPT

## 2020-01-17 PROCEDURE — 99214 OFFICE O/P EST MOD 30 MIN: CPT | Mod: 25

## 2020-01-17 NOTE — HISTORY OF PRESENT ILLNESS
[de-identified] : Pt presents for evaluation -\par \par Had gastroenteritis this past week - nausea/vomiting/diarrhea.\par Started taking protonix at my direction on Tuesday.\par Feeling  lot better from a GI standpoint - still with mild GERD symptoms.\par \par Still with severe back pain - had thoracic MRI done earlier in the week  - no significant changes from previous.\par Seeing Physiatry later this morning.

## 2020-01-17 NOTE — ASSESSMENT
[FreeTextEntry1] : Blood work was drawn and sent to the lab today. The patient has been instructed to call the office next week to discuss today's lab work.\par \par Protonix bid for 14 days, then decrease to daily.\par \par F/U with physiatry.\par \par F/U pending BW and progress.

## 2020-01-17 NOTE — REVIEW OF SYSTEMS
[Abdominal Pain] : no abdominal pain [Nausea] : no nausea [Diarrhea] : diarrhea [Heartburn] : heartburn [Vomiting] : no vomiting [Negative] : Respiratory

## 2020-01-18 LAB
ALBUMIN SERPL ELPH-MCNC: 4.4 G/DL
ALP BLD-CCNC: 68 U/L
ALT SERPL-CCNC: 23 U/L
AMYLASE/CREAT SERPL: 69 U/L
ANION GAP SERPL CALC-SCNC: 11 MMOL/L
AST SERPL-CCNC: 17 U/L
BASOPHILS # BLD AUTO: 0.04 K/UL
BASOPHILS NFR BLD AUTO: 0.4 %
BILIRUB SERPL-MCNC: 0.3 MG/DL
BUN SERPL-MCNC: 13 MG/DL
CALCIUM SERPL-MCNC: 9.2 MG/DL
CHLORIDE SERPL-SCNC: 102 MMOL/L
CO2 SERPL-SCNC: 24 MMOL/L
CREAT SERPL-MCNC: 0.65 MG/DL
EOSINOPHIL # BLD AUTO: 0.13 K/UL
EOSINOPHIL NFR BLD AUTO: 1.3 %
GLUCOSE SERPL-MCNC: 78 MG/DL
HCT VFR BLD CALC: 39.9 %
HGB BLD-MCNC: 13.3 G/DL
IMM GRANULOCYTES NFR BLD AUTO: 0.5 %
LPL SERPL-CCNC: 70 U/L
LYMPHOCYTES # BLD AUTO: 2.22 K/UL
LYMPHOCYTES NFR BLD AUTO: 22.7 %
MAN DIFF?: NORMAL
MCHC RBC-ENTMCNC: 28.9 PG
MCHC RBC-ENTMCNC: 33.3 GM/DL
MCV RBC AUTO: 86.6 FL
MONOCYTES # BLD AUTO: 0.68 K/UL
MONOCYTES NFR BLD AUTO: 6.9 %
NEUTROPHILS # BLD AUTO: 6.68 K/UL
NEUTROPHILS NFR BLD AUTO: 68.2 %
PLATELET # BLD AUTO: 281 K/UL
POTASSIUM SERPL-SCNC: 3.6 MMOL/L
PROT SERPL-MCNC: 7.2 G/DL
RBC # BLD: 4.61 M/UL
RBC # FLD: 13.2 %
SODIUM SERPL-SCNC: 137 MMOL/L
WBC # FLD AUTO: 9.8 K/UL

## 2020-01-24 ENCOUNTER — RX RENEWAL (OUTPATIENT)
Age: 35
End: 2020-01-24

## 2020-03-10 DIAGNOSIS — Z86.19 PERSONAL HISTORY OF OTHER INFECTIOUS AND PARASITIC DISEASES: ICD-10-CM

## 2020-03-10 RX ORDER — CLOTRIMAZOLE 10 MG/G
1 CREAM TOPICAL
Qty: 1 | Refills: 0 | Status: ACTIVE | COMMUNITY
Start: 2020-03-10 | End: 1900-01-01

## 2020-03-27 NOTE — HISTORY OF PRESENT ILLNESS
[FreeTextEntry8] : Pt presents with complaints of abdominal pain.\par was in ER - evaluation was negative

## 2020-03-27 NOTE — REVIEW OF SYSTEMS
[Abdominal Pain] : abdominal pain [Nausea] : nausea [Heartburn] : heartburn [Negative] : Respiratory

## 2020-04-03 ENCOUNTER — APPOINTMENT (OUTPATIENT)
Dept: DERMATOLOGY | Facility: CLINIC | Age: 35
End: 2020-04-03

## 2020-04-25 ENCOUNTER — MESSAGE (OUTPATIENT)
Age: 35
End: 2020-04-25

## 2020-04-28 RX ORDER — NITROFURANTOIN (MONOHYDRATE/MACROCRYSTALS) 25; 75 MG/1; MG/1
100 CAPSULE ORAL
Qty: 14 | Refills: 0 | Status: DISCONTINUED | COMMUNITY
Start: 2020-01-07 | End: 2020-04-28

## 2020-05-06 ENCOUNTER — APPOINTMENT (OUTPATIENT)
Dept: DISASTER EMERGENCY | Facility: HOSPITAL | Age: 35
End: 2020-05-06

## 2020-05-07 LAB
SARS-COV-2 IGG SERPL IA-ACNC: 0 INDEX
SARS-COV-2 IGG SERPL QL IA: NEGATIVE

## 2020-05-09 ENCOUNTER — APPOINTMENT (OUTPATIENT)
Dept: INTERNAL MEDICINE | Facility: CLINIC | Age: 35
End: 2020-05-09
Payer: COMMERCIAL

## 2020-05-09 PROCEDURE — 99214 OFFICE O/P EST MOD 30 MIN: CPT | Mod: 95

## 2020-05-09 RX ORDER — CLINDAMYCIN PHOSPHATE AND BENZOYL PEROXIDE 10; 50 MG/G; MG/G
1.2-5 GEL TOPICAL
Qty: 45 | Refills: 0 | Status: COMPLETED | COMMUNITY
Start: 2020-04-16

## 2020-05-09 RX ORDER — TOPIRAMATE 25 MG/1
25 TABLET, FILM COATED ORAL TWICE DAILY
Qty: 1 | Refills: 1 | Status: DISCONTINUED | COMMUNITY
Start: 2019-10-18 | End: 2020-05-09

## 2020-05-09 RX ORDER — FLUCONAZOLE 150 MG/1
150 TABLET ORAL
Qty: 1 | Refills: 0 | Status: DISCONTINUED | COMMUNITY
Start: 2019-12-18 | End: 2020-05-09

## 2020-05-09 RX ORDER — TRETINOIN 0.25 MG/G
0.03 CREAM TOPICAL
Qty: 20 | Refills: 0 | Status: COMPLETED | COMMUNITY
Start: 2020-04-16

## 2020-05-09 RX ORDER — OXYCODONE 5 MG/1
5 TABLET ORAL
Qty: 20 | Refills: 0 | Status: DISCONTINUED | COMMUNITY
Start: 2017-02-16 | End: 2020-05-09

## 2020-05-09 RX ORDER — CLINDAMYCIN PHOSPHATE 10 MG/ML
1 LOTION TOPICAL
Qty: 60 | Refills: 0 | Status: COMPLETED | COMMUNITY
Start: 2020-02-23

## 2020-05-09 RX ORDER — VALACYCLOVIR 500 MG/1
500 TABLET, FILM COATED ORAL TWICE DAILY
Qty: 60 | Refills: 0 | Status: DISCONTINUED | COMMUNITY
Start: 2019-09-26 | End: 2020-05-09

## 2020-05-09 RX ORDER — NYSTATIN 100000 [USP'U]/G
100000 CREAM TOPICAL TWICE DAILY
Qty: 1 | Refills: 0 | Status: DISCONTINUED | COMMUNITY
Start: 2019-12-18 | End: 2020-05-09

## 2020-05-09 RX ORDER — PREDNISONE 10 MG/1
10 TABLET ORAL
Qty: 34 | Refills: 0 | Status: DISCONTINUED | COMMUNITY
Start: 2019-12-30 | End: 2020-05-09

## 2020-05-09 RX ORDER — FLUCONAZOLE 150 MG/1
150 TABLET ORAL
Qty: 1 | Refills: 0 | Status: DISCONTINUED | COMMUNITY
Start: 2020-03-10 | End: 2020-05-09

## 2020-05-09 RX ORDER — ZOLPIDEM TARTRATE 5 MG/1
5 TABLET ORAL
Qty: 30 | Refills: 2 | Status: DISCONTINUED | COMMUNITY
Start: 2019-02-20 | End: 2020-05-09

## 2020-05-09 NOTE — REVIEW OF SYSTEMS
[Headache] : headache [Suicidal] : not suicidal [Insomnia] : insomnia [Anxiety] : anxiety [Depression] : depression [Negative] : Gastrointestinal

## 2020-05-09 NOTE — PHYSICAL EXAM
[No Acute Distress] : no acute distress [Normal Sclera/Conjunctiva] : normal sclera/conjunctiva [No Respiratory Distress] : no respiratory distress  [No Accessory Muscle Use] : no accessory muscle use [Normal Mental Status] : the patient's orientation, memory, attention, language and fund of knowledge were normal [Anxious] : anxious [Depressed] : depressed [Impaired judgment] : intact judgment [Impaired Insight] : intact insight [Racing Thoughts] : racing thoughts [Normal Associations] :  no deficiency [Suicidal Ideation] : denied suicidal ideation [Suicidal Intent] : denied suicidal intent [Suicidal Plan] : denied suicidal plans [Homicidal Ideation] : denied homicidal ideation [Homicidal Intent] : denied homicidal intention [Homicidal Plan] : denied homicidal plans

## 2020-05-09 NOTE — HISTORY OF PRESENT ILLNESS
[Home] : at home, [unfilled] , at the time of the visit. [Medical Office: ([facility])___] : at the medical office located in  [Patient] : the patient [Self] : self [FreeTextEntry8] : Pt presents for televideo evaluation - pt admits to insomnia for the past week, with headaches, mostly relieved with NSAIDs.\par Pt very stressed at work during the pandemic. Also with increased financial and parental responsibilities which has left her completely overwhelmed.\par NO SI/HI\par Sometimes having a hard time functioning, and getting into more conflicts with co-workers.

## 2020-05-09 NOTE — ASSESSMENT
[FreeTextEntry1] : Pt has maxed her SSRI and wellbutrin, but I feel she needs additional medications for her depression/anxiety.\par I have referred her to psychiatry.\par Consider changing alprazolam to clonazepam.\par Consider trazodone to help with sleep.\par \par Will follow closely.

## 2020-05-28 ENCOUNTER — APPOINTMENT (OUTPATIENT)
Dept: INTERNAL MEDICINE | Facility: CLINIC | Age: 35
End: 2020-05-28
Payer: COMMERCIAL

## 2020-05-28 LAB
BASOPHILS # BLD AUTO: 0.04 K/UL
BASOPHILS NFR BLD AUTO: 0.6 %
EOSINOPHIL # BLD AUTO: 0.15 K/UL
EOSINOPHIL NFR BLD AUTO: 2.1 %
HCT VFR BLD CALC: 38.4 %
HGB BLD-MCNC: 12.7 G/DL
IMM GRANULOCYTES NFR BLD AUTO: 0.7 %
LYMPHOCYTES # BLD AUTO: 1.93 K/UL
LYMPHOCYTES NFR BLD AUTO: 27.6 %
MAN DIFF?: NORMAL
MCHC RBC-ENTMCNC: 29.4 PG
MCHC RBC-ENTMCNC: 33.1 GM/DL
MCV RBC AUTO: 88.9 FL
MONOCYTES # BLD AUTO: 0.54 K/UL
MONOCYTES NFR BLD AUTO: 7.7 %
NEUTROPHILS # BLD AUTO: 4.29 K/UL
NEUTROPHILS NFR BLD AUTO: 61.3 %
PLATELET # BLD AUTO: 288 K/UL
RBC # BLD: 4.32 M/UL
RBC # FLD: 12.7 %
WBC # FLD AUTO: 7 K/UL

## 2020-05-28 PROCEDURE — 36415 COLL VENOUS BLD VENIPUNCTURE: CPT

## 2020-05-29 LAB
25(OH)D3 SERPL-MCNC: 22.4 NG/ML
ALBUMIN SERPL ELPH-MCNC: 4.2 G/DL
ALP BLD-CCNC: 77 U/L
ALT SERPL-CCNC: 31 U/L
ANION GAP SERPL CALC-SCNC: 17 MMOL/L
APPEARANCE: CLEAR
AST SERPL-CCNC: 27 U/L
BILIRUB SERPL-MCNC: 0.2 MG/DL
BILIRUBIN URINE: NEGATIVE
BLOOD URINE: NEGATIVE
BUN SERPL-MCNC: 19 MG/DL
CALCIUM SERPL-MCNC: 8.9 MG/DL
CHLORIDE SERPL-SCNC: 103 MMOL/L
CHOLEST SERPL-MCNC: 194 MG/DL
CHOLEST/HDLC SERPL: 3.3 RATIO
CO2 SERPL-SCNC: 17 MMOL/L
COLOR: YELLOW
CREAT SERPL-MCNC: 0.7 MG/DL
ESTIMATED AVERAGE GLUCOSE: 97 MG/DL
GLUCOSE QUALITATIVE U: NEGATIVE
GLUCOSE SERPL-MCNC: 94 MG/DL
HBA1C MFR BLD HPLC: 5 %
HDLC SERPL-MCNC: 60 MG/DL
KETONES URINE: NEGATIVE
LDLC SERPL CALC-MCNC: 118 MG/DL
LEUKOCYTE ESTERASE URINE: NEGATIVE
NITRITE URINE: NEGATIVE
PH URINE: 6.5
POTASSIUM SERPL-SCNC: 4.3 MMOL/L
PROT SERPL-MCNC: 6.9 G/DL
PROTEIN URINE: NEGATIVE
SODIUM SERPL-SCNC: 136 MMOL/L
SPECIFIC GRAVITY URINE: 1.02
T4 SERPL-MCNC: 6.9 UG/DL
TRIGL SERPL-MCNC: 83 MG/DL
TSH SERPL-ACNC: 1.54 UIU/ML
UROBILINOGEN URINE: NORMAL

## 2020-05-30 ENCOUNTER — APPOINTMENT (OUTPATIENT)
Dept: INTERNAL MEDICINE | Facility: CLINIC | Age: 35
End: 2020-05-30
Payer: COMMERCIAL

## 2020-05-30 VITALS — HEART RATE: 72 BPM | SYSTOLIC BLOOD PRESSURE: 120 MMHG | RESPIRATION RATE: 14 BRPM | DIASTOLIC BLOOD PRESSURE: 70 MMHG

## 2020-05-30 VITALS — WEIGHT: 218 LBS | BODY MASS INDEX: 38.62 KG/M2 | HEIGHT: 63 IN

## 2020-05-30 LAB — BACTERIA UR CULT: NORMAL

## 2020-05-30 PROCEDURE — G0444 DEPRESSION SCREEN ANNUAL: CPT

## 2020-05-30 PROCEDURE — 99051 MED SERV EVE/WKEND/HOLIDAY: CPT

## 2020-05-30 PROCEDURE — 99395 PREV VISIT EST AGE 18-39: CPT | Mod: 25

## 2020-05-30 PROCEDURE — 99214 OFFICE O/P EST MOD 30 MIN: CPT | Mod: 25

## 2020-05-30 PROCEDURE — 93000 ELECTROCARDIOGRAM COMPLETE: CPT | Mod: 59

## 2020-05-30 RX ORDER — DICLOFENAC SODIUM 75 MG/1
75 TABLET, DELAYED RELEASE ORAL TWICE DAILY
Qty: 3 | Refills: 0 | Status: DISCONTINUED | COMMUNITY
Start: 2019-06-09 | End: 2020-05-30

## 2020-05-30 RX ORDER — PANTOPRAZOLE 40 MG/1
40 TABLET, DELAYED RELEASE ORAL DAILY
Qty: 30 | Refills: 1 | Status: DISCONTINUED | COMMUNITY
Start: 2020-01-10 | End: 2020-05-30

## 2020-05-30 NOTE — HISTORY OF PRESENT ILLNESS
[FreeTextEntry1] : JANAE HERRERA is a 35 year old female  presents for an annual physical. Patient is also here for f/u of chronic medical conditions, which have been stable on medications. These include depression, obesity, GERD, low back pain.\par  [de-identified] : pt has been stressed a great deal from the pandemic.\par Has tried to see Psych one visit so far - was advised to begin effexor but has not started.\par Pt has been struggling with her depression, at times having suicidal thoughts.\par None now.\par \par Back pain/ GERD has been under control.\par Sees OBGYN regularly.

## 2020-05-30 NOTE — PHYSICAL EXAM
[Well Developed] : well developed [No Acute Distress] : no acute distress [Well Nourished] : well nourished [Normal Sclera/Conjunctiva] : normal sclera/conjunctiva [PERRL] : pupils equal round and reactive to light [EOMI] : extraocular movements intact [Well-Appearing] : well-appearing [No Lymphadenopathy] : no lymphadenopathy [Normal Oropharynx] : the oropharynx was normal [No JVD] : no jugular venous distention [Normal Outer Ear/Nose] : the outer ears and nose were normal in appearance [No Respiratory Distress] : no respiratory distress  [Supple] : supple [Thyroid Normal, No Nodules] : the thyroid was normal and there were no nodules present [Normal Rate] : normal rate  [Clear to Auscultation] : lungs were clear to auscultation bilaterally [No Accessory Muscle Use] : no accessory muscle use [Regular Rhythm] : with a regular rhythm [Normal S1, S2] : normal S1 and S2 [No Murmur] : no murmur heard [No Carotid Bruits] : no carotid bruits [No Varicosities] : no varicosities [No Edema] : there was no peripheral edema [No Abdominal Bruit] : a ~M bruit was not heard ~T in the abdomen [Pedal Pulses Present] : the pedal pulses are present [No Extremity Clubbing/Cyanosis] : no extremity clubbing/cyanosis [No Palpable Aorta] : no palpable aorta [Soft] : abdomen soft [No Masses] : no abdominal mass palpated [Non-distended] : non-distended [Non Tender] : non-tender [Normal Bowel Sounds] : normal bowel sounds [Normal Posterior Cervical Nodes] : no posterior cervical lymphadenopathy [No HSM] : no HSM [Normal Anterior Cervical Nodes] : no anterior cervical lymphadenopathy [No Joint Swelling] : no joint swelling [No CVA Tenderness] : no CVA  tenderness [No Spinal Tenderness] : no spinal tenderness [Grossly Normal Strength/Tone] : grossly normal strength/tone [Coordination Grossly Intact] : coordination grossly intact [No Rash] : no rash [No Focal Deficits] : no focal deficits [Normal Gait] : normal gait [Normal Affect] : the affect was normal [Deep Tendon Reflexes (DTR)] : deep tendon reflexes were 2+ and symmetric [Normal Insight/Judgement] : insight and judgment were intact [de-identified] : Dr Curtis RUCKER

## 2020-05-30 NOTE — HEALTH RISK ASSESSMENT
[Excellent] : ~his/her~ current health as excellent [Fair] :  ~his/her~ mood as fair [No] : In the past 12 months have you used drugs other than those required for medical reasons? No [Yes] : Yes [No falls in past year] : Patient reported no falls in the past year [3] : 2) Feeling down, depressed, or hopeless for nearly every day (3) [Patient reported PAP Smear was normal] : Patient reported PAP Smear was normal [HIV Test offered] : HIV Test offered [Hepatitis C test offered] : Hepatitis C test offered [With Family] : lives with family [# Of Children ___] : has [unfilled] children [High School] : high school [Fully functional (bathing, dressing, toileting, transferring, walking, feeding)] : Fully functional (bathing, dressing, toileting, transferring, walking, feeding) [Employed] : employed [Fully functional (using the telephone, shopping, preparing meals, housekeeping, doing laundry, using] : Fully functional and needs no help or supervision to perform IADLs (using the telephone, shopping, preparing meals, housekeeping, doing laundry, using transportation, managing medications and managing finances) [] : No [[id number]] : 6 [Change in mental status noted] : No change in mental status noted [Reports changes in dental health] : Reports no changes in dental health [PapSmearDate] : 10/19 [Reports changes in vision] : Reports no changes in vision [Reports changes in hearing] : Reports no changes in hearing

## 2020-05-30 NOTE — ASSESSMENT
[FreeTextEntry1] : Labs reviewed\par \par Advised continued f/u with psych as well as proceeding with addition of Effexor.\par \par weight loss / increase exercise\par \par Add Vitamin D3 1000  IU/d\par \par Routine OBGYN eval\par Optho eval per routine\par \par F/U 3 months

## 2020-08-17 ENCOUNTER — APPOINTMENT (OUTPATIENT)
Dept: DISASTER EMERGENCY | Facility: CLINIC | Age: 35
End: 2020-08-17

## 2020-08-19 LAB — SARS-COV-2 N GENE NPH QL NAA+PROBE: NOT DETECTED

## 2020-08-28 ENCOUNTER — APPOINTMENT (OUTPATIENT)
Dept: INTERNAL MEDICINE | Facility: CLINIC | Age: 35
End: 2020-08-28

## 2020-09-04 NOTE — DISCHARGE NOTE ADULT - REASON FOR ADMISSION
Patient presented to ED with back pain and was called back when lytic lesion discovered on review of plain films. MRI of articular skeleton and head with many lytic lesions of thoracic spine/posterior ribs, soft tissue mass with invasion into right hip acetabulum and brain lesions. Two spiculated nodules in RUL concerning for primary lung cancer with widespread metastases although must rule out multiple myeloma. Also with lesions noted on thyroid and adrenal glands on imaging    Plan:  -s/p IR CH-csnced-ediodw of T11 soft tissue mass. Awaiting path and genetic workup of tumor.  -preliminary read: metastatic adenocarcinoma, IHC testing revealing ?GI/hepatobiliary source although no current imaging consistent with GI mass other than scattered liver cysts seen on MR and CT which were too small to characterize  -appreciate oncology recommendations  -Khorana score 2: patient at increased risk of VTE due to RLE immobility from lytic lesion of hip although also with small CPA intracranial metastasis. Decision re outpatient AC also dependent on decision re chemo/immunotherapy  -f/u CBC and Ddimer for risk stratification: Khorana score still 2 (Platelet count, and tumor site), ddimer elevated  -will call pathology to send NGS Lower anterior resection

## 2020-09-05 ENCOUNTER — APPOINTMENT (OUTPATIENT)
Dept: INTERNAL MEDICINE | Facility: CLINIC | Age: 35
End: 2020-09-05
Payer: COMMERCIAL

## 2020-09-05 VITALS — HEART RATE: 82 BPM | OXYGEN SATURATION: 99 % | HEIGHT: 63 IN | TEMPERATURE: 98 F

## 2020-09-05 VITALS — HEART RATE: 78 BPM | RESPIRATION RATE: 14 BRPM | SYSTOLIC BLOOD PRESSURE: 120 MMHG | DIASTOLIC BLOOD PRESSURE: 70 MMHG

## 2020-09-05 DIAGNOSIS — Z11.1 ENCOUNTER FOR SCREENING FOR RESPIRATORY TUBERCULOSIS: ICD-10-CM

## 2020-09-05 PROCEDURE — 36415 COLL VENOUS BLD VENIPUNCTURE: CPT

## 2020-09-05 PROCEDURE — 99051 MED SERV EVE/WKEND/HOLIDAY: CPT

## 2020-09-05 PROCEDURE — 99213 OFFICE O/P EST LOW 20 MIN: CPT | Mod: 25

## 2020-09-05 NOTE — ASSESSMENT
[FreeTextEntry1] : Blood work was drawn and sent to the lab today. The patient has been instructed to call the office next week to discuss today's lab work.\par \par Continue current meds\par Continue to hold saxenda and wellbutrin\par \par F/U 3-4 months

## 2020-09-05 NOTE — HISTORY OF PRESENT ILLNESS
[de-identified] : pt has been stressed a great deal from the pandemic.\par doing ok - just on cymbalta\par \par Back pain/ GERD has been under control.\par Sees OBGYN regularly.\par \par had liposuction procedure done last month - f/u with surgeon as scheduled\par doing lymph massage

## 2020-09-09 LAB
M TB IFN-G BLD-IMP: NEGATIVE
QUANTIFERON TB PLUS MITOGEN MINUS NIL: 7.45 IU/ML
QUANTIFERON TB PLUS NIL: 0.05 IU/ML
QUANTIFERON TB PLUS TB1 MINUS NIL: 0.02 IU/ML
QUANTIFERON TB PLUS TB2 MINUS NIL: 0 IU/ML

## 2020-10-20 ENCOUNTER — APPOINTMENT (OUTPATIENT)
Dept: INTERNAL MEDICINE | Facility: CLINIC | Age: 35
End: 2020-10-20
Payer: COMMERCIAL

## 2020-10-20 PROCEDURE — 99213 OFFICE O/P EST LOW 20 MIN: CPT | Mod: 95

## 2020-10-20 NOTE — PLAN
[FreeTextEntry1] : Advised patient to fill Sumatriptan--take once and may repeat q2hrs max 200/day; will also fill her next supply of Fiorcet as she is due to renew. She has appt with Neurologist schedule for the first week of Nov--if migraine persists despite above, may need to consider repeat CT  brain to ensure no component of pseudotumor cerebri, has risk factor of obesity per chart last BMI 38, recent vomiting, no vision changes. Pt to contact office if pain persists.

## 2020-10-20 NOTE — PHYSICAL EXAM
[Normal] : no acute distress, well nourished, well developed and well-appearing [Speech Grossly Normal] : speech grossly normal [Normal Affect] : the affect was normal [Normal Insight/Judgement] : insight and judgment were intact [Alert and Oriented x3] : oriented to person, place, and time [de-identified] : limited exam on telemed, but conjunctiva appear normal

## 2020-10-20 NOTE — HISTORY OF PRESENT ILLNESS
[Home] : at home, [unfilled] , at the time of the visit. [Medical Office: ([facility])___] : at the medical office located in  [Verbal consent obtained from patient] : the patient, [unfilled] [FreeTextEntry8] : JANAE HERRERA is a 35 year old female who was evaluated via telemedicine for complaint of headache. She describes a long history of migraine headaches. Current headache localized to the left temple region, described as sensation of "hot oil dripping into my brain". She feels pain is ~9/10 presently, throbbing-like with slight associated nausea. She has been taking Excedrin and Aleve without any improvement; also has been taking Esgic 4-5/day since Friday, also with minimal improvement. She also takes Imitrex, but notes that she ran out of it a few days ago--usually takes q6h when she has a migraine. She does not feel like this headache is the worst one that she had; she recalls when first diagnosed with migraines she had a headache that was worse. \par No fevers, no visual disturbances.

## 2020-11-21 RX ORDER — FLUOCINONIDE 0.5 MG/G
0.05 CREAM TOPICAL TWICE DAILY
Qty: 1 | Refills: 0 | Status: ACTIVE | COMMUNITY
Start: 2020-11-21 | End: 1900-01-01

## 2020-11-30 ENCOUNTER — NON-APPOINTMENT (OUTPATIENT)
Age: 35
End: 2020-11-30

## 2020-12-05 ENCOUNTER — APPOINTMENT (OUTPATIENT)
Dept: INTERNAL MEDICINE | Facility: CLINIC | Age: 35
End: 2020-12-05
Payer: COMMERCIAL

## 2020-12-05 VITALS — HEIGHT: 63 IN

## 2020-12-05 PROCEDURE — 99051 MED SERV EVE/WKEND/HOLIDAY: CPT

## 2020-12-05 PROCEDURE — 99214 OFFICE O/P EST MOD 30 MIN: CPT | Mod: 25

## 2020-12-05 PROCEDURE — 99072 ADDL SUPL MATRL&STAF TM PHE: CPT

## 2020-12-05 RX ORDER — BUPROPION HYDROCHLORIDE 300 MG/1
300 TABLET, EXTENDED RELEASE ORAL
Qty: 90 | Refills: 1 | Status: DISCONTINUED | COMMUNITY
Start: 2019-11-16 | End: 2020-12-05

## 2020-12-05 RX ORDER — LEVONORGESTREL 52 MG/1
INTRAUTERINE DEVICE INTRAUTERINE
Refills: 0 | Status: DISCONTINUED | COMMUNITY
End: 2020-12-05

## 2020-12-05 NOTE — ASSESSMENT
[FreeTextEntry1] : Continue all meds\par \par Reassurance and counselling provided.\par \par F/U with Neurology / Neurosx\par Advised evaluation with psychiatry for depression / anxiety ASAP.\par Pt denies HI/SI.\par \par F/U 3 months\par

## 2020-12-07 ENCOUNTER — NON-APPOINTMENT (OUTPATIENT)
Age: 35
End: 2020-12-07

## 2020-12-11 ENCOUNTER — NON-APPOINTMENT (OUTPATIENT)
Age: 35
End: 2020-12-11

## 2020-12-16 ENCOUNTER — TRANSCRIPTION ENCOUNTER (OUTPATIENT)
Age: 35
End: 2020-12-16

## 2020-12-21 ENCOUNTER — APPOINTMENT (OUTPATIENT)
Dept: INTERNAL MEDICINE | Facility: CLINIC | Age: 35
End: 2020-12-21
Payer: COMMERCIAL

## 2020-12-21 ENCOUNTER — NON-APPOINTMENT (OUTPATIENT)
Age: 35
End: 2020-12-21

## 2020-12-21 VITALS
RESPIRATION RATE: 18 BRPM | SYSTOLIC BLOOD PRESSURE: 124 MMHG | HEART RATE: 90 BPM | BODY MASS INDEX: 40.39 KG/M2 | WEIGHT: 228 LBS | DIASTOLIC BLOOD PRESSURE: 70 MMHG | OXYGEN SATURATION: 99 %

## 2020-12-21 PROBLEM — Z86.19 HISTORY OF CANDIDIASIS OF VAGINA: Status: RESOLVED | Noted: 2017-10-30 | Resolved: 2020-12-21

## 2020-12-21 PROBLEM — N39.0 ACUTE LOWER UTI: Status: RESOLVED | Noted: 2018-08-17 | Resolved: 2020-12-21

## 2020-12-21 PROCEDURE — 99214 OFFICE O/P EST MOD 30 MIN: CPT | Mod: 25

## 2020-12-21 PROCEDURE — 99072 ADDL SUPL MATRL&STAF TM PHE: CPT

## 2020-12-21 PROCEDURE — 36415 COLL VENOUS BLD VENIPUNCTURE: CPT

## 2020-12-21 NOTE — REVIEW OF SYSTEMS
[Shortness Of Breath] : shortness of breath [Wheezing] : no wheezing [Dyspnea on Exertion] : dyspnea on exertion [Negative] : Genitourinary

## 2020-12-21 NOTE — ASSESSMENT
[FreeTextEntry1] : Blood work was drawn and sent to the lab today. The patient has been instructed to call the office next week to discuss today's lab work.\par \par Albuterol inhaler 2 puffs q4 hours PRN\par \par For CXR PA and Lat\par \par Consider d/c topiramate if above negative.\par \par F/U if symptoms do not resolve, or if they should change/worsen.\par

## 2020-12-21 NOTE — HISTORY OF PRESENT ILLNESS
[FreeTextEntry8] : Pt presents for evaluation of SOB for 4-5 days, worsening.\par No fever/chills.\par No cough.\par Worse with exertion.\par NO CP/palpitations.\par No sick contacts.\par \par No leg pain/swelling.\par Has recently had IUD removed.\par On topiramate per Neuro for HA

## 2020-12-23 ENCOUNTER — TRANSCRIPTION ENCOUNTER (OUTPATIENT)
Age: 35
End: 2020-12-23

## 2020-12-23 ENCOUNTER — APPOINTMENT (OUTPATIENT)
Dept: CT IMAGING | Facility: IMAGING CENTER | Age: 35
End: 2020-12-23
Payer: COMMERCIAL

## 2020-12-23 ENCOUNTER — OUTPATIENT (OUTPATIENT)
Dept: OUTPATIENT SERVICES | Facility: HOSPITAL | Age: 35
LOS: 1 days | End: 2020-12-23
Payer: COMMERCIAL

## 2020-12-23 DIAGNOSIS — R09.02 HYPOXEMIA: ICD-10-CM

## 2020-12-23 DIAGNOSIS — Z98.51 TUBAL LIGATION STATUS: Chronic | ICD-10-CM

## 2020-12-23 PROBLEM — Z86.19 HISTORY OF CANDIDIASIS OF VAGINA: Status: RESOLVED | Noted: 2020-03-10 | Resolved: 2020-12-23

## 2020-12-23 LAB
ALBUMIN SERPL ELPH-MCNC: 4.4 G/DL
ALP BLD-CCNC: 73 U/L
ALT SERPL-CCNC: 31 U/L
ANION GAP SERPL CALC-SCNC: 16 MMOL/L
AST SERPL-CCNC: 22 U/L
BASOPHILS # BLD AUTO: 0.03 K/UL
BASOPHILS NFR BLD AUTO: 0.3 %
BILIRUB SERPL-MCNC: 0.2 MG/DL
BUN SERPL-MCNC: 16 MG/DL
CALCIUM SERPL-MCNC: 9.1 MG/DL
CHLORIDE SERPL-SCNC: 109 MMOL/L
CO2 SERPL-SCNC: 15 MMOL/L
CREAT SERPL-MCNC: 0.79 MG/DL
DEPRECATED D DIMER PPP IA-ACNC: 283 NG/ML DDU
EOSINOPHIL # BLD AUTO: 0.19 K/UL
EOSINOPHIL NFR BLD AUTO: 1.9 %
ERYTHROCYTE [SEDIMENTATION RATE] IN BLOOD BY WESTERGREN METHOD: 13 MM/HR
GLUCOSE SERPL-MCNC: 82 MG/DL
HCT VFR BLD CALC: 40.6 %
HGB BLD-MCNC: 13 G/DL
IMM GRANULOCYTES NFR BLD AUTO: 0.8 %
LYMPHOCYTES # BLD AUTO: 2.78 K/UL
LYMPHOCYTES NFR BLD AUTO: 28.1 %
MAN DIFF?: NORMAL
MCHC RBC-ENTMCNC: 27.7 PG
MCHC RBC-ENTMCNC: 32 GM/DL
MCV RBC AUTO: 86.6 FL
MONOCYTES # BLD AUTO: 0.67 K/UL
MONOCYTES NFR BLD AUTO: 6.8 %
NEUTROPHILS # BLD AUTO: 6.13 K/UL
NEUTROPHILS NFR BLD AUTO: 62.1 %
PLATELET # BLD AUTO: 302 K/UL
POTASSIUM SERPL-SCNC: 3.9 MMOL/L
PROT SERPL-MCNC: 7.5 G/DL
RBC # BLD: 4.69 M/UL
RBC # FLD: 13.4 %
SODIUM SERPL-SCNC: 141 MMOL/L
WBC # FLD AUTO: 9.88 K/UL

## 2020-12-23 PROCEDURE — 71275 CT ANGIOGRAPHY CHEST: CPT | Mod: 26

## 2020-12-23 PROCEDURE — 71275 CT ANGIOGRAPHY CHEST: CPT

## 2020-12-24 ENCOUNTER — TRANSCRIPTION ENCOUNTER (OUTPATIENT)
Age: 35
End: 2020-12-24

## 2020-12-26 ENCOUNTER — NON-APPOINTMENT (OUTPATIENT)
Age: 35
End: 2020-12-26

## 2021-01-11 ENCOUNTER — NON-APPOINTMENT (OUTPATIENT)
Age: 36
End: 2021-01-11

## 2021-02-10 ENCOUNTER — APPOINTMENT (OUTPATIENT)
Dept: INTERNAL MEDICINE | Facility: CLINIC | Age: 36
End: 2021-02-10

## 2021-03-27 ENCOUNTER — APPOINTMENT (OUTPATIENT)
Dept: INTERNAL MEDICINE | Facility: CLINIC | Age: 36
End: 2021-03-27
Payer: COMMERCIAL

## 2021-03-27 VITALS
TEMPERATURE: 97.9 F | WEIGHT: 220 LBS | BODY MASS INDEX: 40.48 KG/M2 | OXYGEN SATURATION: 97 % | HEART RATE: 109 BPM | HEIGHT: 62 IN | RESPIRATION RATE: 14 BRPM | SYSTOLIC BLOOD PRESSURE: 112 MMHG | DIASTOLIC BLOOD PRESSURE: 70 MMHG

## 2021-03-27 DIAGNOSIS — S93.609A UNSPECIFIED SPRAIN OF UNSPECIFIED FOOT, INITIAL ENCOUNTER: ICD-10-CM

## 2021-03-27 PROCEDURE — 99214 OFFICE O/P EST MOD 30 MIN: CPT

## 2021-03-27 PROCEDURE — 99072 ADDL SUPL MATRL&STAF TM PHE: CPT

## 2021-03-27 RX ORDER — LIRAGLUTIDE 6 MG/ML
18 INJECTION, SOLUTION SUBCUTANEOUS
Qty: 15 | Refills: 1 | Status: DISCONTINUED | COMMUNITY
Start: 2019-06-16 | End: 2021-03-27

## 2021-03-27 RX ORDER — ZOLPIDEM TARTRATE 5 MG/1
5 TABLET ORAL
Qty: 60 | Refills: 0 | Status: DISCONTINUED | COMMUNITY
Start: 2020-07-06 | End: 2021-03-27

## 2021-03-27 RX ORDER — ZOLPIDEM TARTRATE 10 MG/1
10 TABLET ORAL DAILY
Qty: 30 | Refills: 0 | Status: DISCONTINUED | COMMUNITY
Start: 2020-08-29 | End: 2021-03-27

## 2021-03-27 RX ORDER — BUDESONIDE AND FORMOTEROL FUMARATE DIHYDRATE 80; 4.5 UG/1; UG/1
80-4.5 AEROSOL RESPIRATORY (INHALATION)
Qty: 1 | Refills: 0 | Status: DISCONTINUED | COMMUNITY
Start: 2021-01-14 | End: 2021-03-27

## 2021-03-28 NOTE — HISTORY OF PRESENT ILLNESS
[de-identified] : pt has been stressed a great deal from the pandemic.\par \par Pt has had migraine / headaches - improving on Topamax\par Has seen neurology Dr Noriega - MRI showed possible venous sinus stenosis - was also advised to have IUD removed\par Has seen neuro-opthalmology who did not see any evidence of papilledema.\par LP not being considered at the mom\par \par Mood has been better. Taking duloxetine\par \par Had diagnosis of b/l ankle tendonitis, considering surgery later this summer

## 2021-03-28 NOTE — ASSESSMENT
[FreeTextEntry1] : Continue all meds\par Neuro f/u\par Orthopedic f/u - will need MC prior to surgery\par \par EMMY smith checked\par \par For CC in three months

## 2021-04-13 NOTE — H&P PST ADULT - BLOOD TRANSFUSION, PREVIOUS, PROFILE
Called patient as requested to see if he is back from [facility] yet. No answer X 2. Will try again in a couple of weeks. NM. 5/2021.   no

## 2021-05-28 ENCOUNTER — APPOINTMENT (OUTPATIENT)
Dept: INTERNAL MEDICINE | Facility: CLINIC | Age: 36
End: 2021-05-28
Payer: COMMERCIAL

## 2021-05-28 DIAGNOSIS — J30.2 OTHER SEASONAL ALLERGIC RHINITIS: ICD-10-CM

## 2021-05-28 PROCEDURE — 99214 OFFICE O/P EST MOD 30 MIN: CPT

## 2021-05-28 PROCEDURE — 99072 ADDL SUPL MATRL&STAF TM PHE: CPT

## 2021-05-29 NOTE — HISTORY OF PRESENT ILLNESS
[de-identified] : pt has been stressed a great deal from the pandemic.\par \par Pt has had migraine / headaches - improving on Topamax\par \par Mood has been better. Taking duloxetine\par \par however, now dealing with stress of daughters injury\par Seasonal allergies are not responding to OTC Zyrtec

## 2021-05-29 NOTE — ASSESSMENT
[FreeTextEntry1] : Continue all meds\par Neuro f/u\par \par Advised psych / therapy f/u\par \par Singulair daily for allergies

## 2021-05-29 NOTE — REVIEW OF SYSTEMS
[Shortness Of Breath] : shortness of breath [Dyspnea on Exertion] : dyspnea on exertion [Negative] : Genitourinary [Wheezing] : no wheezing

## 2021-07-08 ENCOUNTER — APPOINTMENT (OUTPATIENT)
Dept: INTERNAL MEDICINE | Facility: CLINIC | Age: 36
End: 2021-07-08

## 2021-07-08 ENCOUNTER — RX RENEWAL (OUTPATIENT)
Age: 36
End: 2021-07-08

## 2021-08-03 ENCOUNTER — APPOINTMENT (OUTPATIENT)
Dept: RADIOLOGY | Facility: HOSPITAL | Age: 36
End: 2021-08-03
Payer: COMMERCIAL

## 2021-08-03 ENCOUNTER — OUTPATIENT (OUTPATIENT)
Dept: OUTPATIENT SERVICES | Facility: HOSPITAL | Age: 36
LOS: 1 days | End: 2021-08-03
Payer: COMMERCIAL

## 2021-08-03 DIAGNOSIS — G93.2 BENIGN INTRACRANIAL HYPERTENSION: ICD-10-CM

## 2021-08-03 DIAGNOSIS — Z98.51 TUBAL LIGATION STATUS: Chronic | ICD-10-CM

## 2021-08-03 LAB
APPEARANCE CSF: CLEAR — SIGNIFICANT CHANGE UP
COLOR CSF: SIGNIFICANT CHANGE UP
GLUCOSE CSF-MCNC: 57 MG/DL — SIGNIFICANT CHANGE UP (ref 40–70)
LYMPHOCYTES # CSF: 80 % — SIGNIFICANT CHANGE UP (ref 40–80)
MONOS+MACROS NFR CSF: 20 % — SIGNIFICANT CHANGE UP (ref 15–45)
NEUTROPHILS # CSF: 0 % — SIGNIFICANT CHANGE UP (ref 0–6)
NRBC NFR CSF: 1 /UL — SIGNIFICANT CHANGE UP (ref 0–5)
PROT CSF-MCNC: 42 MG/DL — SIGNIFICANT CHANGE UP (ref 15–45)
RBC # CSF: 0 /UL — SIGNIFICANT CHANGE UP (ref 0–0)
TUBE TYPE: SIGNIFICANT CHANGE UP

## 2021-08-03 PROCEDURE — 62328 DX LMBR SPI PNXR W/FLUOR/CT: CPT

## 2021-08-03 PROCEDURE — 82945 GLUCOSE OTHER FLUID: CPT

## 2021-08-03 PROCEDURE — 84157 ASSAY OF PROTEIN OTHER: CPT

## 2021-08-03 PROCEDURE — 89051 BODY FLUID CELL COUNT: CPT

## 2021-08-06 ENCOUNTER — NON-APPOINTMENT (OUTPATIENT)
Age: 36
End: 2021-08-06

## 2021-08-07 DIAGNOSIS — G97.1 OTHER REACTION TO SPINAL AND LUMBAR PUNCTURE: ICD-10-CM

## 2021-08-09 ENCOUNTER — NON-APPOINTMENT (OUTPATIENT)
Age: 36
End: 2021-08-09

## 2021-08-09 ENCOUNTER — APPOINTMENT (OUTPATIENT)
Dept: PULMONOLOGY | Facility: CLINIC | Age: 36
End: 2021-08-09
Payer: COMMERCIAL

## 2021-08-09 DIAGNOSIS — R06.83 SNORING: ICD-10-CM

## 2021-08-09 PROCEDURE — 99205 OFFICE O/P NEW HI 60 MIN: CPT

## 2021-08-09 NOTE — ASSESSMENT
[FreeTextEntry1] : 35 y/o F with PMH of migraine headaches, insomnia and depression presented to the sleep clinic for an initial evaluation. She was seen by her neurologist who referred her to the sleep clinic for evaluation after she had an LP that showed elevated ICP.  Her main sleep complaints is frequent nocturnal awakenings and nonrestorative sleep.  She snores and wakes up with a dry mouth.  She wakes up feeling unrefreshed and has EDS.  Her ESS was 8 on today's encounter.  On exam, her oropharynx is crowded due to retrognathia, enlarged base of tongue and low lying soft palate -- all of which increase risk of REID. Assessment and treatment of REID is important for management of somnolence and nonrestorative sleep.  The patient was referred to the [facility] for diagnostic polysomnography with T-CO2 monitoring for further assessment. The ramifications of obstructive sleep apnea and its potential therapeutic modalities were discussed with the patient. The dangers of drowsy driving were discussed with the patient.  The patient was warned to avoid drowsy driving. The patient will followup after results of this study are available.\par \par She also notes issues with sleep initiation insomnia.  She has a variable sleep schedule.  Going to bed between 10 pm and 2 am at times.  Usually wakes up at 7 am but after the LP, she has been getting up at 9 am.  She takes Ambien 10 mg at night as a sedative hypnotic. Stimulus control, sleep restriction, and sleep hygiene measures were reviewed with the patient.  Sleep diaries was provided to the patient.  She was advised to anchor a daily fixed carlos up time at 7:00 am(patient's preferred wake up time) with reporting to bed at 1:00 am on Week 1. On Week 2, she will report to bed 30 - minutes earlier (to 12:30 am) and on Week 3 at 12:00a to regularize, and improve sleep quality and duration. She will continue her Ambien for now. She was also counseled to take her Cymbalta in the AM. Patient was advised to avoid spending large amounts of time in bed awake, to get into bed only when sleepy in order to increase sleep efficiency and to not use electronic devices (I-PAD, computer, phone) while in bed when he cannot sleep. The role of this intervention in strengthening the connection between the bed, bedroom environment and the ability to fall asleep was explained to the patient. \par \par  \par Jean Joyce, \par Pulmonary, Critical Care and Sleep Medicine Attending

## 2021-08-09 NOTE — PHYSICAL EXAM
[General Appearance - Well Developed] : well developed [Normal Appearance] : normal appearance [Well Groomed] : well groomed [General Appearance - Well Nourished] : well nourished [No Deformities] : no deformities [General Appearance - In No Acute Distress] : no acute distress [Normal Conjunctiva] : the conjunctiva exhibited no abnormalities [Eyelids - No Xanthelasma] : the eyelids demonstrated no xanthelasmas [Low Lying Soft Palate] : low lying soft palate [Neck Appearance] : the appearance of the neck was normal [Neck Cervical Mass (___cm)] : no neck mass was observed [Jugular Venous Distention Increased] : there was no jugular-venous distention [Thyroid Diffuse Enlargement] : the thyroid was not enlarged [Thyroid Nodule] : there were no palpable thyroid nodules [Heart Rate And Rhythm] : heart rate was normal and rhythm regular [Heart Sounds] : normal S1 and S2 [Heart Sounds Gallop] : no gallops [Murmurs] : no murmurs [Heart Sounds Pericardial Friction Rub] : no pericardial rub [Auscultation Breath Sounds / Voice Sounds] : lungs were clear to auscultation bilaterally [Abnormal Walk] : normal gait [Musculoskeletal - Swelling] : no joint swelling seen [Motor Tone] : muscle strength and tone were normal [Nail Clubbing] : no clubbing of the fingernails [Cyanosis, Localized] : no localized cyanosis [Petechial Hemorrhages (___cm)] : no petechial hemorrhages [Skin Color & Pigmentation] : normal skin color and pigmentation [Skin Turgor] : normal skin turgor [] : no rash [Deep Tendon Reflexes (DTR)] : deep tendon reflexes were 2+ and symmetric [Sensation] : the sensory exam was normal to light touch and pinprick [No Focal Deficits] : no focal deficits [Oriented To Time, Place, And Person] : oriented to person, place, and time [Impaired Insight] : insight and judgment were intact [Affect] : the affect was normal [III] : III

## 2021-08-09 NOTE — REVIEW OF SYSTEMS
[Negative] : Psychiatric [EDS: ESS=____] : daytime somnolence: ESS=[unfilled] [Fatigue] : fatigue [A.M. Dry Mouth] : a.m. dry mouth [Snoring] : snoring [Frequent Nocturnal Awakenings] : frequent nocturnal awakenings from sleep [Daytime Somnolence: ESS=____] : daytime somnolence: ESS=[unfilled] [Awakes Unrefreshed] : nonrestorative sleep [Awakes With Dry Mouth] : awakes with dry mouth [Difficulty Initiating Sleep] : difficulty falling asleep

## 2021-08-09 NOTE — HISTORY OF PRESENT ILLNESS
[FreeTextEntry1] : 37 y/o F with PMH of migraine headaches, insomnia and depression presented to the sleep clinic for an initial evaluation.\par \par Of note, she recently had an LP done which noted her to have elevated ICP.  She was seen by her neurologist who referred her to the sleep clinic for evaluation.  Her main sleep complaints is frequent nocturnal awakenings.  She snores and wakes up with a dry mouth.  She wakes up feeling unrefreshed and has EDS.  Her ESS was 8 on today's encounter.  \par \par She also notes issues with sleep initiation insomnia.  She has a variable sleep schedule.  Going to bed between 10 pm and 2 am at times.  Usually wakes up at 7 am but after the LP, she has been getting up at 9 am.  She takes Ambien 10 mg at night as a sedative hypnotic.\par \par ____________________________________________________________________\par EPWORTH SLEEPINESS SCALE\par \par How likely are you to doze off or fall asleep in the situations described below, in contrast to feeling just tired?  \par This refers to your usual way of life in recent times.  \par Even if you haven't done some of these things recently, try to work out how they would have affected you.\par Use the following scale to choose one most appropriate number for each situation.\par \par Chance of dozing.............Situation\par ............3............................Sitting and reading\par ............3............................Watching TV\par ............0............................Sitting inactive in a public place (eg a theatre or a meeting)\par ............1............................As a passenger in a car for an hour without a break\par ............1............................Lying down to rest in the afternoon when circumstances permit\par ............0............................Sitting and talking to someone\par ............0............................Sitting quietly after lunch without alcohol\par ............0............................In a car, while stopped for a few minutes in traffic\par \par ............8............................TOTAL SCORE\par \par 0 = Never would doze\par 1 = Slight chance of dozing\par 2 = Moderate chance of dozing\par 3 = High chance of dozing \par ______________________________________________________________________  [Snoring] : snoring [Frequent Nocturnal Awakening] : frequent nocturnal awakening [Unintentional Sleep While Inactive] : unintentional sleep while inactive [Awakes Unrefreshed] : awakening unrefreshed [Awakening With Dry Mouth] : awakening with dry mouth [Daytime Somnolence] : daytime somnolence [DIS] : DIS [ESS] : 8

## 2021-08-10 ENCOUNTER — APPOINTMENT (OUTPATIENT)
Dept: INTERNAL MEDICINE | Facility: CLINIC | Age: 36
End: 2021-08-10
Payer: COMMERCIAL

## 2021-08-10 ENCOUNTER — RX RENEWAL (OUTPATIENT)
Age: 36
End: 2021-08-10

## 2021-08-10 VITALS
HEIGHT: 62 IN | WEIGHT: 204 LBS | TEMPERATURE: 98.1 F | SYSTOLIC BLOOD PRESSURE: 120 MMHG | HEART RATE: 84 BPM | DIASTOLIC BLOOD PRESSURE: 80 MMHG | BODY MASS INDEX: 37.54 KG/M2

## 2021-08-10 PROCEDURE — 99213 OFFICE O/P EST LOW 20 MIN: CPT

## 2021-08-11 ENCOUNTER — TRANSCRIPTION ENCOUNTER (OUTPATIENT)
Age: 36
End: 2021-08-11

## 2021-08-11 LAB
RAPID RVP RESULT: NOT DETECTED
SARS-COV-2 RNA PNL RESP NAA+PROBE: NOT DETECTED

## 2021-09-03 ENCOUNTER — APPOINTMENT (OUTPATIENT)
Dept: INTERNAL MEDICINE | Facility: CLINIC | Age: 36
End: 2021-09-03
Payer: COMMERCIAL

## 2021-09-03 VITALS — HEART RATE: 78 BPM | DIASTOLIC BLOOD PRESSURE: 80 MMHG | SYSTOLIC BLOOD PRESSURE: 124 MMHG | RESPIRATION RATE: 14 BRPM

## 2021-09-03 PROCEDURE — 99214 OFFICE O/P EST MOD 30 MIN: CPT

## 2021-09-03 RX ORDER — TOPIRAMATE 25 MG/1
25 TABLET, FILM COATED ORAL TWICE DAILY
Qty: 1 | Refills: 1 | Status: DISCONTINUED | COMMUNITY
Start: 2020-12-05 | End: 2021-09-03

## 2021-09-03 RX ORDER — ELETRIPTAN HYDROBROMIDE 40 MG/1
40 TABLET, FILM COATED ORAL
Refills: 0 | Status: ACTIVE | COMMUNITY
Start: 2021-09-03

## 2021-09-03 NOTE — HISTORY OF PRESENT ILLNESS
[FreeTextEntry8] : Pt presents for evaluation of persistent headaches - pt has been seeing Neurology Dr Noriega since 11/2/20 - has had multiple images including MRI / MRV of brain with and without contrast.\par She had an LP on 8/3/21 which revealed mildly elevated pressure.\par She has been seeing Neuroopthalmology with a working diagnosis of pseudotumor.\par Her headaches are not improving\par She has been increasing her diamox at Dr Ortiz direction.\par She has tapered down on her topamax.\par She has not yet tried eletriptan, recently prescribed.\par However, sumatriptan has not worked in the past.\par \par Symptoms worse when working with up close work / working on computers. Headache causes nausea and vomiting.\par

## 2021-09-03 NOTE — REVIEW OF SYSTEMS
[Vision Problems] : vision problems [Headache] : headache [Dizziness] : dizziness [Negative] : Psychiatric

## 2021-09-03 NOTE — PHYSICAL EXAM
[Normal] : normal gait, coordination grossly intact, no focal deficits and deep tendon reflexes were 2+ and symmetric [de-identified] : +light sensitivity

## 2021-09-11 ENCOUNTER — NON-APPOINTMENT (OUTPATIENT)
Age: 36
End: 2021-09-11

## 2021-09-11 ENCOUNTER — APPOINTMENT (OUTPATIENT)
Dept: INTERNAL MEDICINE | Facility: CLINIC | Age: 36
End: 2021-09-11
Payer: COMMERCIAL

## 2021-09-11 VITALS
HEIGHT: 62 IN | BODY MASS INDEX: 37.91 KG/M2 | SYSTOLIC BLOOD PRESSURE: 122 MMHG | WEIGHT: 206 LBS | HEART RATE: 72 BPM | RESPIRATION RATE: 14 BRPM | DIASTOLIC BLOOD PRESSURE: 80 MMHG

## 2021-09-11 DIAGNOSIS — R51.9 HEADACHE, UNSPECIFIED: ICD-10-CM

## 2021-09-11 PROCEDURE — 99214 OFFICE O/P EST MOD 30 MIN: CPT | Mod: 25

## 2021-09-11 PROCEDURE — G0444 DEPRESSION SCREEN ANNUAL: CPT | Mod: 59

## 2021-09-11 PROCEDURE — 99395 PREV VISIT EST AGE 18-39: CPT | Mod: 25

## 2021-09-11 PROCEDURE — 93000 ELECTROCARDIOGRAM COMPLETE: CPT | Mod: 59

## 2021-09-11 PROCEDURE — 36415 COLL VENOUS BLD VENIPUNCTURE: CPT

## 2021-09-15 ENCOUNTER — APPOINTMENT (OUTPATIENT)
Dept: PAIN MANAGEMENT | Facility: CLINIC | Age: 36
End: 2021-09-15
Payer: COMMERCIAL

## 2021-09-15 ENCOUNTER — NON-APPOINTMENT (OUTPATIENT)
Age: 36
End: 2021-09-15

## 2021-09-15 VITALS
SYSTOLIC BLOOD PRESSURE: 120 MMHG | HEIGHT: 62 IN | DIASTOLIC BLOOD PRESSURE: 83 MMHG | WEIGHT: 206 LBS | BODY MASS INDEX: 37.91 KG/M2 | HEART RATE: 86 BPM

## 2021-09-15 LAB
25(OH)D3 SERPL-MCNC: 20.8 NG/ML
ALBUMIN SERPL ELPH-MCNC: 4.4 G/DL
ALP BLD-CCNC: 89 U/L
ALT SERPL-CCNC: 13 U/L
ANION GAP SERPL CALC-SCNC: 10 MMOL/L
APPEARANCE: CLEAR
AST SERPL-CCNC: 15 U/L
BASOPHILS # BLD AUTO: 0.03 K/UL
BASOPHILS NFR BLD AUTO: 0.4 %
BILIRUB SERPL-MCNC: <0.2 MG/DL
BILIRUBIN URINE: NEGATIVE
BLOOD URINE: NEGATIVE
BUN SERPL-MCNC: 16 MG/DL
CALCIUM SERPL-MCNC: 9 MG/DL
CHLORIDE SERPL-SCNC: 113 MMOL/L
CHOLEST SERPL-MCNC: 196 MG/DL
CO2 SERPL-SCNC: 17 MMOL/L
COLOR: NORMAL
CREAT SERPL-MCNC: 0.67 MG/DL
EOSINOPHIL # BLD AUTO: 0.2 K/UL
EOSINOPHIL NFR BLD AUTO: 2.9 %
ESTIMATED AVERAGE GLUCOSE: 97 MG/DL
FOLATE SERPL-MCNC: 5.3 NG/ML
GLUCOSE QUALITATIVE U: NEGATIVE
GLUCOSE SERPL-MCNC: 85 MG/DL
HBA1C MFR BLD HPLC: 5 %
HCT VFR BLD CALC: 39.8 %
HDLC SERPL-MCNC: 47 MG/DL
HGB BLD-MCNC: 12.4 G/DL
IMM GRANULOCYTES NFR BLD AUTO: 0.4 %
KETONES URINE: NEGATIVE
LDLC SERPL CALC-MCNC: 123 MG/DL
LEUKOCYTE ESTERASE URINE: NEGATIVE
LYMPHOCYTES # BLD AUTO: 2.43 K/UL
LYMPHOCYTES NFR BLD AUTO: 34.7 %
MAGNESIUM SERPL-MCNC: 2.1 MG/DL
MAN DIFF?: NORMAL
MCHC RBC-ENTMCNC: 27.9 PG
MCHC RBC-ENTMCNC: 31.2 GM/DL
MCV RBC AUTO: 89.4 FL
MONOCYTES # BLD AUTO: 0.43 K/UL
MONOCYTES NFR BLD AUTO: 6.1 %
NEUTROPHILS # BLD AUTO: 3.88 K/UL
NEUTROPHILS NFR BLD AUTO: 55.5 %
NITRITE URINE: NEGATIVE
NONHDLC SERPL-MCNC: 150 MG/DL
PH URINE: 6.5
PLATELET # BLD AUTO: 292 K/UL
POTASSIUM SERPL-SCNC: 4.1 MMOL/L
PROT SERPL-MCNC: 7.2 G/DL
PROTEIN URINE: NEGATIVE
RBC # BLD: 4.45 M/UL
RBC # FLD: 14.2 %
SODIUM SERPL-SCNC: 140 MMOL/L
SPECIFIC GRAVITY URINE: 1.01
T4 FREE SERPL-MCNC: 1 NG/DL
T4 SERPL-MCNC: 6.8 UG/DL
TRIGL SERPL-MCNC: 136 MG/DL
TSH SERPL-ACNC: 2 UIU/ML
URATE SERPL-MCNC: 3.7 MG/DL
UROBILINOGEN URINE: NORMAL
VIT B12 SERPL-MCNC: 400 PG/ML
WBC # FLD AUTO: 7 K/UL

## 2021-09-15 PROCEDURE — 99204 OFFICE O/P NEW MOD 45 MIN: CPT

## 2021-09-15 NOTE — HEALTH RISK ASSESSMENT
[Excellent] : ~his/her~ current health as excellent [Fair] :  ~his/her~ mood as fair [Yes] : Yes [No] : In the past 12 months have you used drugs other than those required for medical reasons? No [No falls in past year] : Patient reported no falls in the past year [3] : 2) Feeling down, depressed, or hopeless for nearly every day (3) [Patient reported PAP Smear was normal] : Patient reported PAP Smear was normal [HIV Test offered] : HIV Test offered [Hepatitis C test offered] : Hepatitis C test offered [With Family] : lives with family [Employed] : employed [High School] : high school [# Of Children ___] : has [unfilled] children [Fully functional (bathing, dressing, toileting, transferring, walking, feeding)] : Fully functional (bathing, dressing, toileting, transferring, walking, feeding) [Fully functional (using the telephone, shopping, preparing meals, housekeeping, doing laundry, using] : Fully functional and needs no help or supervision to perform IADLs (using the telephone, shopping, preparing meals, housekeeping, doing laundry, using transportation, managing medications and managing finances) [] : No [Moderately Severe] : severity of depression is moderately severe [PHQ-9 Positive] : PHQ-9 Positive [I have developed a follow-up plan documented below in the note.] : I have developed a follow-up plan documented below in the note. [[id number]] : 6 [[id number]] : 17 [Change in mental status noted] : No change in mental status noted [Reports changes in hearing] : Reports no changes in hearing [Reports changes in vision] : Reports no changes in vision [Reports changes in dental health] : Reports no changes in dental health [PapSmearDate] : 10/19

## 2021-09-15 NOTE — HISTORY OF PRESENT ILLNESS
[FreeTextEntry1] : JANAE HERRERA is a 36 year old female  presents for an annual physical. Patient is also here for f/u of chronic medical conditions, which have been stable on medications. These include depression, obesity, GERD, low back pain and chronic headache.\par  [de-identified] : pt has been stressed a great deal from the pandemic.\par Pt has been struggling with her depression, at times having suicidal thoughts.\par None now.\par \par Back pain/ GERD has been under control.\par Sees OBGYN regularly.\par \par Headaches have been a tremendous issue this year - she has had extensive workup with Dr Noriega including MRI/MRV, and lumbar puncture. She is also seeing neuro opthalmology. She is currently out of work due to her unrelenting HA.\par Mirena removed in December

## 2021-09-15 NOTE — ASSESSMENT
[FreeTextEntry1] : home sleep study \par indocin trial \par prn ubrelvy\par ajovy\par amf and dawnbuse website.

## 2021-09-15 NOTE — PHYSICAL EXAM
[General Appearance - Alert] : alert [General Appearance - In No Acute Distress] : in no acute distress [General Appearance - Well Nourished] : well nourished [General Appearance - Well Developed] : well developed [General Appearance - Well-Appearing] : healthy appearing [Oriented To Time, Place, And Person] : oriented to person, place, and time [Impaired Insight] : insight and judgment were intact [Affect] : the affect was normal [Mood] : the mood was normal [Memory Recent] : recent memory was not impaired [Memory Remote] : remote memory was not impaired [Person] : oriented to person [Place] : oriented to place [Time] : oriented to time [Short Term Intact] : short term memory intact [Remote Intact] : remote memory intact [Registration Intact] : recent registration memory intact [Concentration Intact] : normal concentrating ability [Visual Intact] : visual attention was ~T not ~L decreased [Naming Objects] : no difficulty naming common objects [Writing A Sentence] : no difficulty writing a sentence [Fluency] : fluency intact [Comprehension] : comprehension intact [Reading] : reading intact [Current Events] : adequate knowledge of current events [Past History] : adequate knowledge of personal past history [Cranial Nerves Oculomotor (III)] : extraocular motion intact [Cranial Nerves Facial (VII)] : face symmetrical [Cranial Nerves Vestibulocochlear (VIII)] : hearing was intact bilaterally [Cranial Nerves Accessory (XI - Cranial And Spinal)] : head turning and shoulder shrug symmetric [Cranial Nerves Hypoglossal (XII)] : there was no tongue deviation with protrusion [No Muscle Atrophy] : normal bulk in all four extremities [Motor Handedness Right-Handed] : the patient is right hand dominant [Motor Strength Upper Extremities Bilaterally] : strength was normal in both upper extremities [Tremor] : no tremor present [Dysdiadochokinesia Bilaterally] : not present [Sclera] : the sclera and conjunctiva were normal [No HERMELINDO] : no internuclear ophthalmoplegia [Strabismus] : no strabismus was seen [Outer Ear] : the ears and nose were normal in appearance [Neck Cervical Mass (___cm)] : no neck mass was observed [Exaggerated Use Of Accessory Muscles For Inspiration] : no accessory muscle use [Edema] : there was no peripheral edema [Abnormal Walk] : normal gait [Nail Clubbing] : no clubbing  or cyanosis of the fingernails [Involuntary Movements] : no involuntary movements were seen [Skin Color & Pigmentation] : normal skin color and pigmentation [] : no rash

## 2021-09-15 NOTE — PHYSICAL EXAM
[Well Nourished] : well nourished [Well Developed] : well developed [Well-Appearing] : well-appearing [Normal Sclera/Conjunctiva] : normal sclera/conjunctiva [PERRL] : pupils equal round and reactive to light [EOMI] : extraocular movements intact [Normal Outer Ear/Nose] : the outer ears and nose were normal in appearance [Normal Oropharynx] : the oropharynx was normal [No JVD] : no jugular venous distention [No Lymphadenopathy] : no lymphadenopathy [Supple] : supple [Thyroid Normal, No Nodules] : the thyroid was normal and there were no nodules present [No Respiratory Distress] : no respiratory distress  [No Accessory Muscle Use] : no accessory muscle use [Clear to Auscultation] : lungs were clear to auscultation bilaterally [Normal Rate] : normal rate  [Regular Rhythm] : with a regular rhythm [Normal S1, S2] : normal S1 and S2 [No Murmur] : no murmur heard [No Carotid Bruits] : no carotid bruits [No Abdominal Bruit] : a ~M bruit was not heard ~T in the abdomen [No Varicosities] : no varicosities [Pedal Pulses Present] : the pedal pulses are present [No Edema] : there was no peripheral edema [No Palpable Aorta] : no palpable aorta [No Extremity Clubbing/Cyanosis] : no extremity clubbing/cyanosis [Soft] : abdomen soft [Non Tender] : non-tender [Non-distended] : non-distended [No HSM] : no HSM [No Masses] : no abdominal mass palpated [Normal Bowel Sounds] : normal bowel sounds [Normal Posterior Cervical Nodes] : no posterior cervical lymphadenopathy [Normal Anterior Cervical Nodes] : no anterior cervical lymphadenopathy [No CVA Tenderness] : no CVA  tenderness [No Spinal Tenderness] : no spinal tenderness [No Joint Swelling] : no joint swelling [Grossly Normal Strength/Tone] : grossly normal strength/tone [No Rash] : no rash [Coordination Grossly Intact] : coordination grossly intact [No Focal Deficits] : no focal deficits [Normal Gait] : normal gait [Deep Tendon Reflexes (DTR)] : deep tendon reflexes were 2+ and symmetric [Normal Affect] : the affect was normal [Normal Insight/Judgement] : insight and judgment were intact [Declined Breast Exam] : declined breast exam  [de-identified] : Dr Curtis RUCKER
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
## 2021-09-15 NOTE — ASSESSMENT
[FreeTextEntry1] : Blood work was drawn and sent to the lab today. The patient has been instructed to call the office next week to discuss today's lab work.\par \par Advised restarting f/u with psych.\par Continue cymbalta\par \par weight loss / increase exercise\par \par Add Vitamin D3 1000  IU/d\par \par Routine OBGYN eval\par Mammogram due to FH of breast CA\par Optho eval per routine\par \par pt to have second neuro opinion this week with Dr Moreno\par \par F/U 3 months\par will get Flu vaccine at work.

## 2021-09-15 NOTE — REVIEW OF SYSTEMS
[Fever] : no fever [Chills] : no chills [Feeling Poorly] : feeling poorly [Feeling Tired] : feeling tired [Eye Pain] : eye pain [Eyesight Problems] : eyesight problems [Loss Of Hearing] : no hearing loss [Nasal Discharge] : no nasal discharge [Chest Pain] : no chest pain [Palpitations] : no palpitations [Shortness Of Breath] : shortness of breath [Cough] : no cough [Arthralgias] : arthralgias [Neck Pain] : neck pain [Lower Back Pain] : no lower back pain [Skin Lesions] : no skin lesions [Itching] : no itching [Convulsions] : no convulsions [Fainting] : no fainting [Sleep Disturbances] : sleep disturbances [Muscle Weakness] : no muscle weakness [Swollen Glands] : no swollen glands

## 2021-09-19 ENCOUNTER — TRANSCRIPTION ENCOUNTER (OUTPATIENT)
Age: 36
End: 2021-09-19

## 2021-09-20 ENCOUNTER — NON-APPOINTMENT (OUTPATIENT)
Age: 36
End: 2021-09-20

## 2021-09-20 DIAGNOSIS — B37.3 CANDIDIASIS OF VULVA AND VAGINA: ICD-10-CM

## 2021-10-01 ENCOUNTER — NON-APPOINTMENT (OUTPATIENT)
Age: 36
End: 2021-10-01

## 2021-10-03 ENCOUNTER — FORM ENCOUNTER (OUTPATIENT)
Age: 36
End: 2021-10-03

## 2021-10-03 ENCOUNTER — OUTPATIENT (OUTPATIENT)
Dept: OUTPATIENT SERVICES | Facility: HOSPITAL | Age: 36
LOS: 1 days | End: 2021-10-03
Payer: COMMERCIAL

## 2021-10-03 ENCOUNTER — APPOINTMENT (OUTPATIENT)
Dept: SLEEP CENTER | Facility: CLINIC | Age: 36
End: 2021-10-03
Payer: COMMERCIAL

## 2021-10-03 DIAGNOSIS — Z98.51 TUBAL LIGATION STATUS: Chronic | ICD-10-CM

## 2021-10-03 PROCEDURE — 95810 POLYSOM 6/> YRS 4/> PARAM: CPT | Mod: 26

## 2021-10-03 PROCEDURE — 95810 POLYSOM 6/> YRS 4/> PARAM: CPT

## 2021-10-04 ENCOUNTER — APPOINTMENT (OUTPATIENT)
Dept: SURGERY | Facility: CLINIC | Age: 36
End: 2021-10-04

## 2021-10-05 DIAGNOSIS — G47.33 OBSTRUCTIVE SLEEP APNEA (ADULT) (PEDIATRIC): ICD-10-CM

## 2021-10-18 ENCOUNTER — NON-APPOINTMENT (OUTPATIENT)
Age: 36
End: 2021-10-18

## 2021-10-19 ENCOUNTER — RESULT REVIEW (OUTPATIENT)
Age: 36
End: 2021-10-19

## 2021-10-20 ENCOUNTER — APPOINTMENT (OUTPATIENT)
Dept: PAIN MANAGEMENT | Facility: CLINIC | Age: 36
End: 2021-10-20
Payer: COMMERCIAL

## 2021-10-20 VITALS
BODY MASS INDEX: 39.07 KG/M2 | DIASTOLIC BLOOD PRESSURE: 88 MMHG | WEIGHT: 212.31 LBS | HEART RATE: 101 BPM | HEIGHT: 62 IN | SYSTOLIC BLOOD PRESSURE: 124 MMHG

## 2021-10-20 PROCEDURE — 99212 OFFICE O/P EST SF 10 MIN: CPT

## 2021-10-20 NOTE — PHYSICAL EXAM
[General Appearance - Alert] : alert [General Appearance - Well-Appearing] : healthy appearing [Oriented To Time, Place, And Person] : oriented to person, place, and time [Affect] : the affect was normal [Mood] : the mood was normal [Motor Strength] : muscle strength was normal in all four extremities [Paresis Pronator Drift Right-Sided] : no pronator drift on the right [Paresis Pronator Drift Left-Sided] : no pronator drift on the left [Motor Strength Upper Extremities Bilaterally] : strength was normal in both upper extremities [Motor Strength Lower Extremities Bilaterally] : strength was normal in both lower extremities [Sclera] : the sclera and conjunctiva were normal [PERRL With Normal Accommodation] : pupils were equal in size, round, reactive to light, with normal accommodation [Extraocular Movements] : extraocular movements were intact [Optic Disc Abnormality] : the optic disc were normal in size and color [] : no respiratory distress [Abnormal Walk] : normal gait

## 2021-10-20 NOTE — HISTORY OF PRESENT ILLNESS
[FreeTextEntry1] : Pt is here today for a followup visit. Continues to have chronic daily headache/ migraine. \par Has not been able to get approval for Ajovy yet. Has tried Ubrelvy and has been helpful. \par Pt has visits with Neuro- ophthalmologist every 2 months. \par Next appt 11/5. \par \par Pt currently taking Diamox 200mg / day and Topamax 100mg / day .  \par \par  [Headache] : headache [Dizziness] : dizziness [Nausea] : nausea [Photophobia] : photophobia [Phonophobia] : phonophobia [Daily] : daily

## 2021-10-20 NOTE — ASSESSMENT
[FreeTextEntry1] : Taper off Topamax\par Continue to to try to attain Ajovy \par RTO 3-4 weeks. \par \par Dr Moreno on site , billed incident to service.

## 2021-10-29 ENCOUNTER — APPOINTMENT (OUTPATIENT)
Dept: PAIN MANAGEMENT | Facility: CLINIC | Age: 36
End: 2021-10-29

## 2021-11-01 ENCOUNTER — APPOINTMENT (OUTPATIENT)
Dept: INTERNAL MEDICINE | Facility: CLINIC | Age: 36
End: 2021-11-01
Payer: COMMERCIAL

## 2021-11-01 VITALS — DIASTOLIC BLOOD PRESSURE: 78 MMHG | HEART RATE: 84 BPM | RESPIRATION RATE: 14 BRPM | SYSTOLIC BLOOD PRESSURE: 116 MMHG

## 2021-11-01 PROCEDURE — 36415 COLL VENOUS BLD VENIPUNCTURE: CPT

## 2021-11-01 PROCEDURE — 99214 OFFICE O/P EST MOD 30 MIN: CPT | Mod: 25

## 2021-11-03 LAB
ALBUMIN SERPL ELPH-MCNC: 4.1 G/DL
ALP BLD-CCNC: 79 U/L
ALT SERPL-CCNC: 11 U/L
ANION GAP SERPL CALC-SCNC: 12 MMOL/L
AST SERPL-CCNC: 14 U/L
BASOPHILS # BLD AUTO: 0.04 K/UL
BASOPHILS NFR BLD AUTO: 0.5 %
BILIRUB SERPL-MCNC: 0.2 MG/DL
BUN SERPL-MCNC: 17 MG/DL
CALCIUM SERPL-MCNC: 8.5 MG/DL
CHLORIDE SERPL-SCNC: 113 MMOL/L
CO2 SERPL-SCNC: 16 MMOL/L
CREAT SERPL-MCNC: 0.73 MG/DL
EOSINOPHIL # BLD AUTO: 0.24 K/UL
EOSINOPHIL NFR BLD AUTO: 3.3 %
FERRITIN SERPL-MCNC: 21 NG/ML
FOLATE SERPL-MCNC: 6.4 NG/ML
GLUCOSE SERPL-MCNC: 86 MG/DL
HCT VFR BLD CALC: 36.3 %
HGB BLD-MCNC: 11.8 G/DL
IMM GRANULOCYTES NFR BLD AUTO: 0.4 %
IRON SATN MFR SERPL: 21 %
IRON SERPL-MCNC: 68 UG/DL
LYMPHOCYTES # BLD AUTO: 2.4 K/UL
LYMPHOCYTES NFR BLD AUTO: 32.8 %
MAN DIFF?: NORMAL
MCHC RBC-ENTMCNC: 28.4 PG
MCHC RBC-ENTMCNC: 32.5 GM/DL
MCV RBC AUTO: 87.5 FL
MONOCYTES # BLD AUTO: 0.43 K/UL
MONOCYTES NFR BLD AUTO: 5.9 %
NEUTROPHILS # BLD AUTO: 4.17 K/UL
NEUTROPHILS NFR BLD AUTO: 57.1 %
PLATELET # BLD AUTO: 259 K/UL
POTASSIUM SERPL-SCNC: 4.2 MMOL/L
PROT SERPL-MCNC: 6.8 G/DL
RBC # BLD: 4.15 M/UL
RBC # FLD: 14.3 %
SODIUM SERPL-SCNC: 140 MMOL/L
TIBC SERPL-MCNC: 330 UG/DL
TSH SERPL-ACNC: 1.51 UIU/ML
UIBC SERPL-MCNC: 262 UG/DL
VIT B12 SERPL-MCNC: 373 PG/ML
WBC # FLD AUTO: 7.31 K/UL

## 2021-11-05 NOTE — REVIEW OF SYSTEMS
[Abdominal Pain] : abdominal pain [Nausea] : nausea [Constipation] : no constipation [Diarrhea] : diarrhea [Vomiting] : vomiting [Heartburn] : heartburn [Melena] : no melena [Negative] : Musculoskeletal

## 2021-11-05 NOTE — HISTORY OF PRESENT ILLNESS
[FreeTextEntry8] : Pt presents for evaluation - pt describes one episode of hematemesis as well as worsening of her menstrual bleeding.\par She continues to be depressed.\par Not seeing therapist.\par Pt denies intentional vomiting.\par 
<--- Click to Launch ICDx for PreOp, PostOp and Procedure

## 2021-11-05 NOTE — ASSESSMENT
[FreeTextEntry1] : Blood work was drawn and sent to the lab today. The patient has been instructed to call the office next week to discuss today's lab work.\par \par Continue protonix\par \par OBGYN evalution needed\par \par May need GI if pt experiences another episode of hematemesis.\par \par Needs psychiatry f/u\par \par will follow closely.

## 2021-11-05 NOTE — PHYSICAL EXAM
[Well Nourished] : well nourished [Well Developed] : well developed [Well-Appearing] : well-appearing [Normal Sclera/Conjunctiva] : normal sclera/conjunctiva [PERRL] : pupils equal round and reactive to light [EOMI] : extraocular movements intact [Normal Outer Ear/Nose] : the outer ears and nose were normal in appearance [Normal Oropharynx] : the oropharynx was normal [No JVD] : no jugular venous distention [No Lymphadenopathy] : no lymphadenopathy [Supple] : supple [Thyroid Normal, No Nodules] : the thyroid was normal and there were no nodules present [No Respiratory Distress] : no respiratory distress  [No Accessory Muscle Use] : no accessory muscle use [Clear to Auscultation] : lungs were clear to auscultation bilaterally [Normal Rate] : normal rate  [Regular Rhythm] : with a regular rhythm [Normal S1, S2] : normal S1 and S2 [No Murmur] : no murmur heard [No Carotid Bruits] : no carotid bruits [No Abdominal Bruit] : a ~M bruit was not heard ~T in the abdomen [No Varicosities] : no varicosities [Pedal Pulses Present] : the pedal pulses are present [No Edema] : there was no peripheral edema [No Palpable Aorta] : no palpable aorta [No Extremity Clubbing/Cyanosis] : no extremity clubbing/cyanosis [Declined Breast Exam] : declined breast exam  [Soft] : abdomen soft [Non Tender] : non-tender [Non-distended] : non-distended [No Masses] : no abdominal mass palpated [No HSM] : no HSM [Normal Bowel Sounds] : normal bowel sounds [Normal Posterior Cervical Nodes] : no posterior cervical lymphadenopathy [Normal Anterior Cervical Nodes] : no anterior cervical lymphadenopathy [No CVA Tenderness] : no CVA  tenderness [No Spinal Tenderness] : no spinal tenderness [No Joint Swelling] : no joint swelling [Grossly Normal Strength/Tone] : grossly normal strength/tone [No Rash] : no rash [Coordination Grossly Intact] : coordination grossly intact [No Focal Deficits] : no focal deficits [Normal Gait] : normal gait [Deep Tendon Reflexes (DTR)] : deep tendon reflexes were 2+ and symmetric [Normal Affect] : the affect was normal [Normal Insight/Judgement] : insight and judgment were intact [de-identified] : Dr Curtis RUCKER

## 2021-11-11 ENCOUNTER — APPOINTMENT (OUTPATIENT)
Dept: PAIN MANAGEMENT | Facility: CLINIC | Age: 36
End: 2021-11-11
Payer: COMMERCIAL

## 2021-11-11 VITALS
BODY MASS INDEX: 39.01 KG/M2 | SYSTOLIC BLOOD PRESSURE: 118 MMHG | DIASTOLIC BLOOD PRESSURE: 84 MMHG | HEIGHT: 62 IN | WEIGHT: 212 LBS | HEART RATE: 73 BPM

## 2021-11-11 PROCEDURE — 96372 THER/PROPH/DIAG INJ SC/IM: CPT

## 2021-11-11 PROCEDURE — 99212 OFFICE O/P EST SF 10 MIN: CPT | Mod: 25

## 2021-11-11 NOTE — PHYSICAL EXAM
[General Appearance - Alert] : alert [General Appearance - Well-Appearing] : healthy appearing [] : normal voice and communication [Oriented To Time, Place, And Person] : oriented to person, place, and time [Mood] : the mood was normal [Affect] : the affect was normal [Cranial Nerves Facial (VII)] : face symmetrical [Cranial Nerves Accessory (XI - Cranial And Spinal)] : head turning and shoulder shrug symmetric [Cranial Nerves Hypoglossal (XII)] : there was no tongue deviation with protrusion [Motor Strength] : muscle strength was normal in all four extremities [Paresis Pronator Drift Right-Sided] : no pronator drift on the right [Paresis Pronator Drift Left-Sided] : no pronator drift on the left [Motor Strength Upper Extremities Bilaterally] : strength was normal in both upper extremities [Motor Strength Lower Extremities Bilaterally] : strength was normal in both lower extremities [Sclera] : the sclera and conjunctiva were normal [Extraocular Movements] : extraocular movements were intact [Abnormal Walk] : normal gait

## 2021-11-11 NOTE — ASSESSMENT
[FreeTextEntry1] : Ajovy 225mg given to left lower abdomen - tolerated injection well. \par \par Re- Start topamax 25mg this week and increase to 50mg in 7 days \par RTO 1month \par \par Dr Moreno on site , billed incident to service. \par

## 2021-11-11 NOTE — HISTORY OF PRESENT ILLNESS
[FreeTextEntry1] : Pt returns today for first dose of Ajovy. \par Has had increase in pressure on left side of head since tapering off Topamax. Did have a recent appt with Neuro -opthalmogy and reports no papilledema. \par  Pt denies any new symptoms. Denies blurry vision or floaters.  \par \par  [Headache] : headache [Daily] : daily

## 2021-11-15 NOTE — ASU PREOP CHECKLIST - LOOSE TEETH
Detail Level: Detailed Body Location Override (Optional - Billing Will Still Be Based On Selected Body Map Location If Applicable): sternum X Size Of Lesion In Cm (Optional): 0 Incorporate Mauc In Note: No no

## 2021-11-15 NOTE — PATIENT PROFILE ADULT - NSPROCHRONICPAIN_GEN_A_NUR
Quality 431: Preventive Care And Screening: Unhealthy Alcohol Use - Screening: Patient not identified as an unhealthy alcohol user when screened for unhealthy alcohol use using a systematic screening method
Detail Level: Detailed
Quality 226: Preventive Care And Screening: Tobacco Use: Screening And Cessation Intervention: Patient screened for tobacco use and is an ex/non-smoker
Quality 130: Documentation Of Current Medications In The Medical Record: Current Medications Documented
no

## 2021-11-16 ENCOUNTER — NON-APPOINTMENT (OUTPATIENT)
Age: 36
End: 2021-11-16

## 2021-11-17 ENCOUNTER — RX RENEWAL (OUTPATIENT)
Age: 36
End: 2021-11-17

## 2021-12-01 ENCOUNTER — APPOINTMENT (OUTPATIENT)
Dept: INTERNAL MEDICINE | Facility: CLINIC | Age: 36
End: 2021-12-01

## 2021-12-04 ENCOUNTER — APPOINTMENT (OUTPATIENT)
Dept: INTERNAL MEDICINE | Facility: CLINIC | Age: 36
End: 2021-12-04
Payer: COMMERCIAL

## 2021-12-04 DIAGNOSIS — F41.9 ANXIETY DISORDER, UNSPECIFIED: ICD-10-CM

## 2021-12-04 PROCEDURE — 99214 OFFICE O/P EST MOD 30 MIN: CPT

## 2021-12-08 NOTE — HISTORY OF PRESENT ILLNESS
[FreeTextEntry8] : Pt presents for evaluation - she has been feeling increasingly depressed and anxious,. Taking cymbalta without obvious benefit.\par Feels very teary eyed\par not sleeping well.\par Headaches are improving, and pt is ready to go back to work pending clearance from S.

## 2021-12-08 NOTE — ASSESSMENT
[FreeTextEntry1] : Restart Wellbutrin  mg daily\par Stressed importance of seeing psychiatry ASAP for further medication adjustment.\par \par

## 2021-12-10 ENCOUNTER — APPOINTMENT (OUTPATIENT)
Dept: PAIN MANAGEMENT | Facility: CLINIC | Age: 36
End: 2021-12-10
Payer: COMMERCIAL

## 2021-12-10 VITALS
WEIGHT: 209 LBS | DIASTOLIC BLOOD PRESSURE: 85 MMHG | HEIGHT: 62 IN | BODY MASS INDEX: 38.46 KG/M2 | HEART RATE: 92 BPM | SYSTOLIC BLOOD PRESSURE: 130 MMHG

## 2021-12-10 PROCEDURE — 99213 OFFICE O/P EST LOW 20 MIN: CPT

## 2021-12-10 NOTE — PHYSICAL EXAM
[General Appearance - Alert] : alert [General Appearance - Well-Appearing] : healthy appearing [Oriented To Time, Place, And Person] : oriented to person, place, and time [Affect] : the affect was normal [Mood] : the mood was normal [Cranial Nerves Facial (VII)] : face symmetrical [Cranial Nerves Accessory (XI - Cranial And Spinal)] : head turning and shoulder shrug symmetric [Cranial Nerves Hypoglossal (XII)] : there was no tongue deviation with protrusion [Abnormal Walk] : normal gait [Sclera] : the sclera and conjunctiva were normal [PERRL With Normal Accommodation] : pupils were equal in size, round, reactive to light, with normal accommodation [Extraocular Movements] : extraocular movements were intact [Optic Disc Abnormality] : the optic disc were normal in size and color [] : no respiratory distress

## 2021-12-10 NOTE — ASSESSMENT
[FreeTextEntry1] : Can increase Topamax to 75mg / day \par Continue Ajovy and Diamox. \par Continue Ubrelvy to abort migraine . \par RTO for a follow up with Dr Moreno 1 month

## 2021-12-10 NOTE — HISTORY OF PRESENT ILLNESS
[Headache] : headache [Nausea] : nausea [Photophobia] : photophobia [Phonophobia] : phonophobia [___ Times Per Week] : [unfilled] times each week [improved] : improved [FreeTextEntry1] : Pt returns today for a follow up visit .\par Started Ajovy 1 month ago . Continues t take Diamox 2000mg / day  and topamax 50mg / day  . Pt continues to have migraine 2x/ week . Taking Ubrelvy to abort migraine with good results. \par \par Pt has an appt with Opthalmology coming up due to blurry vision . \par  \par

## 2021-12-17 ENCOUNTER — RX RENEWAL (OUTPATIENT)
Age: 36
End: 2021-12-17

## 2021-12-20 ENCOUNTER — NON-APPOINTMENT (OUTPATIENT)
Age: 36
End: 2021-12-20

## 2021-12-29 ENCOUNTER — APPOINTMENT (OUTPATIENT)
Dept: PSYCHIATRY | Facility: CLINIC | Age: 36
End: 2021-12-29
Payer: COMMERCIAL

## 2021-12-29 PROCEDURE — 99205 OFFICE O/P NEW HI 60 MIN: CPT

## 2022-01-03 ENCOUNTER — APPOINTMENT (OUTPATIENT)
Dept: PAIN MANAGEMENT | Facility: CLINIC | Age: 37
End: 2022-01-03
Payer: COMMERCIAL

## 2022-01-03 DIAGNOSIS — F51.04 PSYCHOPHYSIOLOGIC INSOMNIA: ICD-10-CM

## 2022-01-03 DIAGNOSIS — G93.2 BENIGN INTRACRANIAL HYPERTENSION: ICD-10-CM

## 2022-01-03 PROCEDURE — 99214 OFFICE O/P EST MOD 30 MIN: CPT | Mod: 95

## 2022-01-09 PROBLEM — F51.04 CHRONIC INSOMNIA: Status: ACTIVE | Noted: 2021-08-09

## 2022-01-09 PROBLEM — G93.2 IDIOPATHIC INTRACRANIAL HYPERTENSION: Status: ACTIVE | Noted: 2021-09-03

## 2022-01-09 NOTE — HISTORY OF PRESENT ILLNESS
[Home] : at home, [unfilled] , at the time of the visit. [Medical Office: ([facility])___] : at the medical office located in  [Verbal consent obtained from patient] : the patient, [unfilled] [FreeTextEntry1] : P notes that she has been trying to adjust with her headaches and has returned to work through telemedicine.\александр has had spiking events which can be pounding to side of her head.\александр Does get some visual changes particularly at night.  Last visit with opthalmology 3 months ago with Dr. Drake (sp?) neuro opthalmology.\александр Has remained on Diamox and did have topamax bumped to 75mg.  Appears to tolerate ok without new se's.\par Less clear of triggers but able to go back to work if she needs a break due to spiking pain. [Headache] : headache [Photophobia] : photophobia [Neck Pain] : neck pain [Scotoma] : no scotoma [> 15 days per month] : > 15 days per month [> 4 hours] : > 4 hours [improved] : improved [Stable] : The patient reports ~his/her~ symptoms since the last visit are stable [de-identified] : some visual blurring

## 2022-01-09 NOTE — ASSESSMENT
[FreeTextEntry1] : To continue current regimen\par TO have f/u with neuroophthalmology re: visual change and repeated funduscopy.\par referral to Dr. Mcintosh for surgical evaluation.

## 2022-01-09 NOTE — PHYSICAL EXAM
[General Appearance - Alert] : alert [General Appearance - In No Acute Distress] : in no acute distress [General Appearance - Well Nourished] : well nourished [General Appearance - Well Developed] : well developed [General Appearance - Well-Appearing] : healthy appearing [Oriented To Time, Place, And Person] : oriented to person, place, and time [Impaired Insight] : insight and judgment were intact [Affect] : the affect was normal [Mood] : the mood was normal [Memory Recent] : recent memory was not impaired [Memory Remote] : remote memory was not impaired [Person] : oriented to person [Place] : oriented to place [Time] : oriented to time [Short Term Intact] : short term memory intact [Remote Intact] : remote memory intact [Registration Intact] : recent registration memory intact [Concentration Intact] : normal concentrating ability [Visual Intact] : visual attention was ~T not ~L decreased [Naming Objects] : no difficulty naming common objects [Fluency] : fluency intact [Comprehension] : comprehension intact [Current Events] : adequate knowledge of current events [Past History] : adequate knowledge of personal past history [Cranial Nerves Oculomotor (III)] : extraocular motion intact [Cranial Nerves Facial (VII)] : face symmetrical [Cranial Nerves Vestibulocochlear (VIII)] : hearing was intact bilaterally [Cranial Nerves Accessory (XI - Cranial And Spinal)] : head turning and shoulder shrug symmetric [Cranial Nerves Hypoglossal (XII)] : there was no tongue deviation with protrusion [Motor Handedness Right-Handed] : the patient is right hand dominant [Sclera] : the sclera and conjunctiva were normal [No HERMELINDO] : no internuclear ophthalmoplegia [Strabismus] : no strabismus was seen [Outer Ear] : the ears and nose were normal in appearance [Neck Cervical Mass (___cm)] : no neck mass was observed [Exaggerated Use Of Accessory Muscles For Inspiration] : no accessory muscle use [Edema] : there was no peripheral edema [Involuntary Movements] : no involuntary movements were seen [Skin Color & Pigmentation] : normal skin color and pigmentation [] : no rash [Motor Strength Upper Extremities Bilaterally] : strength was normal in both upper extremities [Dysdiadochokinesia Bilaterally] : not present

## 2022-01-09 NOTE — REVIEW OF SYSTEMS
[Fever] : no fever [Chills] : no chills [Feeling Poorly] : feeling poorly [Eyesight Problems] : eyesight problems [Nasal Discharge] : no nasal discharge [Chest Pain] : no chest pain [Palpitations] : no palpitations [Cough] : no cough [Arthralgias] : arthralgias [Itching] : no itching [Confused] : no confusion [Convulsions] : no convulsions [Fainting] : no fainting [As Noted in HPI] : as noted in HPI [Muscle Weakness] : no muscle weakness

## 2022-01-19 ENCOUNTER — RX RENEWAL (OUTPATIENT)
Age: 37
End: 2022-01-19

## 2022-01-26 ENCOUNTER — APPOINTMENT (OUTPATIENT)
Dept: PSYCHIATRY | Facility: CLINIC | Age: 37
End: 2022-01-26
Payer: COMMERCIAL

## 2022-01-26 PROCEDURE — 99214 OFFICE O/P EST MOD 30 MIN: CPT | Mod: 95

## 2022-02-04 ENCOUNTER — APPOINTMENT (OUTPATIENT)
Dept: PAIN MANAGEMENT | Facility: CLINIC | Age: 37
End: 2022-02-04
Payer: COMMERCIAL

## 2022-02-04 VITALS
WEIGHT: 205 LBS | SYSTOLIC BLOOD PRESSURE: 130 MMHG | HEART RATE: 100 BPM | BODY MASS INDEX: 37.73 KG/M2 | DIASTOLIC BLOOD PRESSURE: 79 MMHG | HEIGHT: 62 IN

## 2022-02-04 PROCEDURE — 99213 OFFICE O/P EST LOW 20 MIN: CPT

## 2022-02-04 NOTE — HISTORY OF PRESENT ILLNESS
[Headache] : headache [Dizziness] : dizziness [Nausea] : nausea [Photophobia] : photophobia [Phonophobia] : phonophobia [Neck Pain] : neck pain [improved] : improved [FreeTextEntry1] : Pt has an appt with Neuro - opthal - Dr Alamo next week. 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 \par Pt reports working from home she able to mange pain and get work accomplished. \par \par Mood has been stable. No thoughts of suicide . Pt speaks with Psychiatrist 1x/ month .\par Has not contacted Neurosurgeon for consult yet  - Phone # for Dr Strauss given.

## 2022-02-23 ENCOUNTER — APPOINTMENT (OUTPATIENT)
Dept: PSYCHIATRY | Facility: CLINIC | Age: 37
End: 2022-02-23
Payer: COMMERCIAL

## 2022-02-23 PROCEDURE — 99214 OFFICE O/P EST MOD 30 MIN: CPT | Mod: 95

## 2022-03-14 NOTE — ED CDU PROVIDER INITIAL DAY NOTE - GASTROINTESTINAL, MLM
INFECTIOUS DISEASE PROGRESS NOTE      SUBJECTIVE  Doing well in rehab  No fever or chills  Denies abd pain, nausea, vomiting  No new or worsening back or joint    MEDICATIONS  Current Facility-Administered Medications   Medication Dose Route Frequency Provider Last Rate Last Admin   • amLODIPine (NORVASC) tablet 2.5 mg  2.5 mg Oral QHS Seth Aly DO   2.5 mg at 03/13/22 2010   • QUEtiapine (SEROquel) tablet 12.5 mg  12.5 mg Oral QHS Pantera Sorto MD   12.5 mg at 03/13/22 2010   • enoxaparin (LOVENOX) injection 40 mg  40 mg Subcutaneous Q24H Adrienne Jameson CNP   40 mg at 03/12/22 1831   • docusate sodium (ENEMEEZ MINI) 283 MG rectal enema 283 mg  283 mg Rectal Daily PRN Adrienne Jameson CNP       • sodium chloride (PF) 0.9 % injection 2 mL  2 mL Intracatheter 2 times per day Seth Aly DO   2 mL at 03/14/22 0745   • sodium chloride 0.9 % flush bag 25 mL  25 mL Intravenous PRN Seth Aly DO       • acetaminophen (TYLENOL) tablet 650 mg  650 mg Oral Q6H PRN Seth Aly DO       • atenolol (TENORMIN) tablet 100 mg  100 mg Oral Daily Seth Aly DO   100 mg at 03/14/22 0639   • bacitracin ointment   Topical BID Seth Aly DO       • belladonna-opium (B&O SUPPRETTE) 16.2-30 MG suppository 1 suppository  1 suppository Rectal BID PRN Seth Aly DO       • carbidopa-levodopa (SINEMET)  MG per tablet 1 tablet  1 tablet Oral 4x Daily Seth Aly DO   1 tablet at 03/14/22 1220   • cloNIDine (CATAPRES-TTS 3) 0.3 MG/24HR 1 patch  1 patch Transdermal Once per day on Mon Seth Aly DO   1 patch at 03/14/22 0847   • cyanocobalamin (Vitamin B-12) tablet 1,000 mcg  1,000 mcg Oral Daily Seth Aly DO   1,000 mcg at 03/14/22 0846   • docusate sodium (COLACE) capsule 100 mg  100 mg Oral Daily Seth Aly DO   100 mg at 03/11/22 0837   • entacapone (COMTAN) tablet 200 mg  200 mg Oral 4x Daily Seth Aly DO   200 mg at 03/14/22 1220   •  escitalopram (LEXAPRO) tablet 5 mg  5 mg Oral Daily with breakfast Seth Aly DO   5 mg at 03/14/22 0846   • lidocaine (XYLOCAINE) 2 % jelly   Topical PRN Seth Aly DO       • polyethylene glycol (MIRALAX) packet 17 g  17 g Oral Daily Seth Aly DO   17 g at 03/09/22 0928   • tamsulosin (FLOMAX) capsule 0.4 mg  0.4 mg Oral Nightly Seth Aly DO   0.4 mg at 03/13/22 2010   • VANCOMYCIN - PHARMACIST MONITORED Misc   Does not apply See Admin Instructions Seth Aly DO             Allergy:  ALLERGIES:  No Known Allergies    PHYSICAL EXAM  Temp:  [97.7 °F (36.5 °C)-98.4 °F (36.9 °C)] 98.4 °F (36.9 °C)  Heart Rate:  [54-65] 54  Resp:  [16] 16  BP: (115-197)/() 115/71    General appearance: alert, no distress  Heart: regular rate and rhythm, S1, S2 normal  Lungs: clear bilat, no rales or wheezes  Abdomen: soft, NT, ND  : [place] in place  Extremities: well perfused  Skin: no rash    LABORATORY  Recent Labs   Lab 03/14/22  0655   WBC 8.9   RBC 3.02*   HGB 9.7*   HCT 30.8*        Recent Labs   Lab 03/14/22  0655 03/12/22  0842 03/11/22  0531   SODIUM 144 141 141   POTASSIUM 3.5 3.9 4.1   CHLORIDE 109* 108* 110*   CO2 30 28 26   BUN 31* 39* 33*   CREATININE 1.16 1.19* 1.10   GLUCOSE 92 92 97   CALCIUM 8.4 8.8 8.3*     Microbiology Results     None            IMPRESSION:  -S epidermidis sepsis (poa) secondary to CA-UTI (poa)             -TTE neg for vegetations             -Renal US neg for hydronephrosis             -Recent h/o S lugdunensis UTI, treated             -urinary retention  -Parkinson's disease    PLAN:  -Complete vancomycin today, day 10  -supportive care  -will follow      Loly Cutler MD  [facility]  T [phone]  F [phone]  3/14/2022 3:57 PM       Abdomen soft, non-tender, no rebound, no guarding. Abdomen soft, ttp at LLQ and mildly ttp at epigastrium

## 2022-03-17 ENCOUNTER — RX RENEWAL (OUTPATIENT)
Age: 37
End: 2022-03-17

## 2022-03-21 ENCOUNTER — APPOINTMENT (OUTPATIENT)
Dept: INTERNAL MEDICINE | Facility: CLINIC | Age: 37
End: 2022-03-21

## 2022-03-22 ENCOUNTER — APPOINTMENT (OUTPATIENT)
Dept: PAIN MANAGEMENT | Facility: CLINIC | Age: 37
End: 2022-03-22
Payer: COMMERCIAL

## 2022-03-22 VITALS
SYSTOLIC BLOOD PRESSURE: 117 MMHG | DIASTOLIC BLOOD PRESSURE: 81 MMHG | HEART RATE: 112 BPM | HEIGHT: 62 IN | BODY MASS INDEX: 37.73 KG/M2 | WEIGHT: 205 LBS

## 2022-03-22 PROCEDURE — 99213 OFFICE O/P EST LOW 20 MIN: CPT

## 2022-03-22 NOTE — HISTORY OF PRESENT ILLNESS
[FreeTextEntry1] : Pt had an appt with Neuro - opthal - Dr Alamo next and reports her optic nerves look good. Pt will send us letterfrom Dr Alamo. 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 \par Pt reports working from home she able to manage pain and get work accomplished. \par \par Mood has been stable. No thoughts of suicide . Pt speaks with Psychiatrist 1x/ month .\par Has not contacted Neurosurgeon for consult yet  . [Headache] : headache [Dizziness] : dizziness [Nausea] : nausea [Photophobia] : photophobia [Phonophobia] : phonophobia [Neck Pain] : neck pain [improved] : improved

## 2022-03-22 NOTE — ASSESSMENT
[FreeTextEntry1] : Pt to set up consult with Dr Strauss.\par \par No changes made today in medications. \par RTO 4 weeks. \par \par Dr Moreno on site , billed incident to service.

## 2022-03-23 ENCOUNTER — APPOINTMENT (OUTPATIENT)
Dept: PSYCHIATRY | Facility: CLINIC | Age: 37
End: 2022-03-23
Payer: COMMERCIAL

## 2022-03-23 PROCEDURE — 99214 OFFICE O/P EST MOD 30 MIN: CPT | Mod: 95

## 2022-04-04 ENCOUNTER — APPOINTMENT (OUTPATIENT)
Dept: PAIN MANAGEMENT | Facility: CLINIC | Age: 37
End: 2022-04-04

## 2022-04-09 ENCOUNTER — NON-APPOINTMENT (OUTPATIENT)
Age: 37
End: 2022-04-09

## 2022-04-09 ENCOUNTER — APPOINTMENT (OUTPATIENT)
Dept: INTERNAL MEDICINE | Facility: CLINIC | Age: 37
End: 2022-04-09
Payer: COMMERCIAL

## 2022-04-09 VITALS
SYSTOLIC BLOOD PRESSURE: 122 MMHG | HEIGHT: 63.5 IN | RESPIRATION RATE: 14 BRPM | DIASTOLIC BLOOD PRESSURE: 76 MMHG | HEART RATE: 84 BPM | WEIGHT: 204 LBS | BODY MASS INDEX: 35.7 KG/M2

## 2022-04-09 DIAGNOSIS — F32.A DEPRESSION, UNSPECIFIED: ICD-10-CM

## 2022-04-09 PROCEDURE — 93000 ELECTROCARDIOGRAM COMPLETE: CPT | Mod: 59

## 2022-04-09 PROCEDURE — 99213 OFFICE O/P EST LOW 20 MIN: CPT | Mod: 25

## 2022-04-09 RX ORDER — MONTELUKAST 10 MG/1
10 TABLET, FILM COATED ORAL
Qty: 30 | Refills: 2 | Status: DISCONTINUED | COMMUNITY
Start: 2021-05-28 | End: 2022-04-09

## 2022-04-09 RX ORDER — TOPIRAMATE 25 MG/1
25 TABLET, COATED ORAL
Refills: 0 | Status: DISCONTINUED | COMMUNITY
Start: 2021-11-11 | End: 2022-04-09

## 2022-04-09 RX ORDER — KETOROLAC TROMETHAMINE 10 MG/1
10 TABLET, FILM COATED ORAL
Qty: 12 | Refills: 0 | Status: DISCONTINUED | COMMUNITY
Start: 2021-10-01 | End: 2022-04-09

## 2022-04-09 RX ORDER — FLUCONAZOLE 150 MG/1
150 TABLET ORAL
Qty: 2 | Refills: 0 | Status: DISCONTINUED | COMMUNITY
Start: 2021-09-20 | End: 2022-04-09

## 2022-04-10 ENCOUNTER — TRANSCRIPTION ENCOUNTER (OUTPATIENT)
Age: 37
End: 2022-04-10

## 2022-04-11 LAB
ALBUMIN SERPL ELPH-MCNC: 4.3 G/DL
ALP BLD-CCNC: 84 U/L
ALT SERPL-CCNC: 14 U/L
ANION GAP SERPL CALC-SCNC: 10 MMOL/L
APTT BLD: 33.4 SEC
AST SERPL-CCNC: 15 U/L
BASOPHILS # BLD AUTO: 0.06 K/UL
BASOPHILS NFR BLD AUTO: 0.8 %
BILIRUB SERPL-MCNC: <0.2 MG/DL
BUN SERPL-MCNC: 13 MG/DL
CALCIUM SERPL-MCNC: 9.3 MG/DL
CHLORIDE SERPL-SCNC: 112 MMOL/L
CO2 SERPL-SCNC: 19 MMOL/L
CREAT SERPL-MCNC: 0.84 MG/DL
EGFR: 92 ML/MIN/1.73M2
EOSINOPHIL # BLD AUTO: 0.18 K/UL
EOSINOPHIL NFR BLD AUTO: 2.3 %
ESTIMATED AVERAGE GLUCOSE: 103 MG/DL
GLUCOSE SERPL-MCNC: 78 MG/DL
HBA1C MFR BLD HPLC: 5.2 %
HCG SERPL-MCNC: <1 MIU/ML
HCT VFR BLD CALC: 40 %
HGB BLD-MCNC: 12.6 G/DL
IMM GRANULOCYTES NFR BLD AUTO: 0.6 %
INR PPP: 0.96 RATIO
LYMPHOCYTES # BLD AUTO: 2.33 K/UL
LYMPHOCYTES NFR BLD AUTO: 30 %
MAN DIFF?: NORMAL
MCHC RBC-ENTMCNC: 27.9 PG
MCHC RBC-ENTMCNC: 31.5 GM/DL
MCV RBC AUTO: 88.5 FL
MONOCYTES # BLD AUTO: 0.54 K/UL
MONOCYTES NFR BLD AUTO: 7 %
NEUTROPHILS # BLD AUTO: 4.6 K/UL
NEUTROPHILS NFR BLD AUTO: 59.3 %
PLATELET # BLD AUTO: 295 K/UL
POTASSIUM SERPL-SCNC: 4.1 MMOL/L
PROT SERPL-MCNC: 6.9 G/DL
PT BLD: 11.2 SEC
RBC # BLD: 4.52 M/UL
RBC # FLD: 14.6 %
SODIUM SERPL-SCNC: 141 MMOL/L
WBC # FLD AUTO: 7.76 K/UL

## 2022-04-11 NOTE — HISTORY OF PRESENT ILLNESS
[No Pertinent Cardiac History] : no history of aortic stenosis, atrial fibrillation, coronary artery disease, recent myocardial infarction, or implantable device/pacemaker [No Pertinent Pulmonary History] : no history of asthma, COPD, sleep apnea, or smoking [No Adverse Anesthesia Reaction] : no adverse anesthesia reaction in self or family member [(Patient denies any chest pain, claudication, dyspnea on exertion, orthopnea, palpitations or syncope)] : Patient denies any chest pain, claudication, dyspnea on exertion, orthopnea, palpitations or syncope [Excellent (>10 METs)] : Excellent (>10 METs) [FreeTextEntry1] : Extended abdominoplasty with liposuction [FreeTextEntry2] : 5/9/22 [FreeTextEntry3] : Dr Martel  Fax  [FreeTextEntry4] : Pt presents for medical evaluation prior to planned elective procedure.

## 2022-04-11 NOTE — CONSULT LETTER
[Dear  ___] : Dear  [unfilled], [Consult Letter:] : I had the pleasure of evaluating your patient, [unfilled]. [Please see my note below.] : Please see my note below. [Consult Closing:] : Thank you very much for allowing me to participate in the care of this patient.  If you have any questions, please do not hesitate to contact me. [Sincerely,] : Sincerely, [FreeTextEntry3] : Andrade Flores MD\par [facility]\par  of Medicine,\par [employer]\par \par

## 2022-04-11 NOTE — ASSESSMENT
[Patient Optimized for Surgery] : Patient optimized for surgery [No Further Testing Recommended] : no further testing recommended [Modify medications prior to procedure] : Modify medications prior to procedure [As per surgery] : as per surgery [FreeTextEntry7] : NO NSAIDS/ASA one week prior to surgery

## 2022-04-13 ENCOUNTER — OUTPATIENT (OUTPATIENT)
Dept: OUTPATIENT SERVICES | Facility: HOSPITAL | Age: 37
LOS: 1 days | End: 2022-04-13
Payer: COMMERCIAL

## 2022-04-13 ENCOUNTER — APPOINTMENT (OUTPATIENT)
Dept: RADIOLOGY | Facility: CLINIC | Age: 37
End: 2022-04-13
Payer: COMMERCIAL

## 2022-04-13 DIAGNOSIS — Z98.51 TUBAL LIGATION STATUS: Chronic | ICD-10-CM

## 2022-04-13 DIAGNOSIS — Z01.818 ENCOUNTER FOR OTHER PREPROCEDURAL EXAMINATION: ICD-10-CM

## 2022-04-13 PROCEDURE — 71046 X-RAY EXAM CHEST 2 VIEWS: CPT | Mod: 26

## 2022-04-13 PROCEDURE — 71046 X-RAY EXAM CHEST 2 VIEWS: CPT

## 2022-04-14 ENCOUNTER — NON-APPOINTMENT (OUTPATIENT)
Age: 37
End: 2022-04-14

## 2022-04-17 ENCOUNTER — EMERGENCY (EMERGENCY)
Facility: HOSPITAL | Age: 37
LOS: 1 days | Discharge: HOME | End: 2022-04-17
Attending: PERSONAL EMERGENCY RESPONSE ATTENDANT
Payer: COMMERCIAL

## 2022-04-17 VITALS
SYSTOLIC BLOOD PRESSURE: 111 MMHG | HEART RATE: 106 BPM | DIASTOLIC BLOOD PRESSURE: 78 MMHG | TEMPERATURE: 98 F | OXYGEN SATURATION: 98 % | RESPIRATION RATE: 18 BRPM | HEIGHT: 64 IN

## 2022-04-17 VITALS
HEART RATE: 89 BPM | SYSTOLIC BLOOD PRESSURE: 124 MMHG | TEMPERATURE: 98 F | OXYGEN SATURATION: 99 % | DIASTOLIC BLOOD PRESSURE: 85 MMHG | RESPIRATION RATE: 16 BRPM

## 2022-04-17 DIAGNOSIS — Z98.51 TUBAL LIGATION STATUS: Chronic | ICD-10-CM

## 2022-04-17 PROCEDURE — 29515 APPLICATION SHORT LEG SPLINT: CPT

## 2022-04-17 PROCEDURE — 99284 EMERGENCY DEPT VISIT MOD MDM: CPT | Mod: 25

## 2022-04-17 PROCEDURE — 29515 APPLICATION SHORT LEG SPLINT: CPT | Mod: RT

## 2022-04-17 PROCEDURE — 73610 X-RAY EXAM OF ANKLE: CPT

## 2022-04-17 PROCEDURE — 73610 X-RAY EXAM OF ANKLE: CPT | Mod: 26,RT

## 2022-04-17 PROCEDURE — 73590 X-RAY EXAM OF LOWER LEG: CPT

## 2022-04-17 PROCEDURE — 96372 THER/PROPH/DIAG INJ SC/IM: CPT | Mod: XU

## 2022-04-17 PROCEDURE — 73590 X-RAY EXAM OF LOWER LEG: CPT | Mod: 26,RT

## 2022-04-17 RX ORDER — ACETAMINOPHEN 500 MG
975 TABLET ORAL ONCE
Refills: 0 | Status: COMPLETED | OUTPATIENT
Start: 2022-04-17 | End: 2022-04-17

## 2022-04-17 RX ORDER — KETOROLAC TROMETHAMINE 30 MG/ML
15 SYRINGE (ML) INJECTION ONCE
Refills: 0 | Status: DISCONTINUED | OUTPATIENT
Start: 2022-04-17 | End: 2022-04-17

## 2022-04-17 RX ADMIN — Medication 15 MILLIGRAM(S): at 19:03

## 2022-04-17 RX ADMIN — Medication 975 MILLIGRAM(S): at 19:02

## 2022-04-17 NOTE — ED ADULT NURSE NOTE - OBJECTIVE STATEMENT
36 year old female presents to ED via walk-in complaining of ankle pain. States she has had multiple injuries and sprain to right ankle before. Today was sitting cross legged, went to stretch leg and felt a pop in the right foot. After that has been unable to ambulate. Bed locked and lowered. Comfort and safety measures maintained.

## 2022-04-17 NOTE — ED PROVIDER NOTE - PATIENT PORTAL LINK FT
You can access the FollowMyHealth Patient Portal offered by [facility] by registering at the following website: [email or web address]. By joining [employer]’s FollowMyHealth portal, you will also be able to view your health information using other applications (apps) compatible with our system.

## 2022-04-17 NOTE — ED PROVIDER NOTE - ATTENDING CONTRIBUTION TO CARE
Attending MD Mclaughlin.  Agree with HPI by resident physician.  Likely tendonous/ligamentous iunjury/sprain. Attending MD Mclaughlin.  Agree with HPI by resident physician.  Likely tendonous/ligamentous injury/sprain.

## 2022-04-17 NOTE — ED PROVIDER NOTE - NS ED ROS FT
Constitutional:  See HPI  ENMT: No neck pain or stiffness  Cardiac:  No chest pain  Respiratory:  No cough or respiratory distress.   GI:  No nausea, vomiting, diarrhea or abdominal pain.  MS:  +R ankle pain with some radiation upwards into leg.   Neuro:  No headache   Skin:  No skin rash  Except as documented in the HPI,  all other systems are negative

## 2022-04-17 NOTE — ED PROVIDER NOTE - CLINICAL SUMMARY MEDICAL DECISION MAKING FREE TEXT BOX
fern pgy1: 37yo F with known hx of ligament issues in R ankle now presenting with acute pain after prolonged sitting cross legged position. Possible worsening ankle sprain , unlikely fx given mechanism. Xray, pain control, will need ortho f/u ultimately.

## 2022-04-17 NOTE — ED ADULT NURSE NOTE - NSICDXPASTMEDICALHX_GEN_ALL_CORE_FT
PAST MEDICAL HISTORY:  Anxiety and depression     Diverticulitis of intestine without perforation or abscess with bleeding     Insomnia     Migraines     Problems related to multiparity

## 2022-04-17 NOTE — ED PROVIDER NOTE - OBJECTIVE STATEMENT
37yo F with PMH of idiopathic intracranial HTN, migraines, venous sinus stenosis, hx of diverticulitis s/p colectomy presents to ED for R ankle pain. Hx of sprains to ankle in past, sees Dr Mely zacarias 6mo ago who was planning for plating given ligament instability. Today was sitting cross legged on floor for some time with her kids, tried to straighten R ankle to stretch foot and felt a 'pop' and had immediate ankle pain. Afterwards unable weight bear, so came to ED. Last took tylenol for HA ~3pm. didn't take any other pain meds. Denies fall, twisting, or other recent strain to ankle that preceding. Ankle was feeling well prior to this.

## 2022-04-17 NOTE — ED PROVIDER NOTE - NSFOLLOWUPCLINICS_GEN_ALL_ED_FT
[facility]  Sports Medicine  [address] [place] [address]  Phone: [phone]  Fax:     [facility]  Orthopedic Surgery  [address] [address]  Phone: [phone]  Fax:

## 2022-04-17 NOTE — ED PROVIDER NOTE - PHYSICAL EXAMINATION
CONSTITUTIONAL: NAD  SKIN: Warm dry  HEAD: NCAT  ENT: MMM  NECK: Supple; non tender.  CARD: RRR  RESP: CTAB  ABD: S/NT no R/G  EXT: R ankle lateral swelling, holding foot still, unable to range at ankle, intact sensation/dorsalis pedis pulses; no erythema; some pain in ankle when squeezing mid calf, no pain or swelling @ knee/hip. L ankle WNL exam  NEURO: Grossly unremarkable  PSYCH: Cooperative, appropriate.

## 2022-04-17 NOTE — ED PROVIDER NOTE - NSFOLLOWUPINSTRUCTIONS_ED_ALL_ED_FT
Fracture - Right Navicular Foot Bone    A fracture is a break in one of your bones. This can occur from a variety of injuries, especially traumatic ones. Symptoms include pain, bruising, or swelling. Do not use the injured limb. If a fracture is in one of the bones below your waist, do not put weight on that limb unless instructed to do so by your healthcare provider. Crutches or a cane may have been provided. A splint or cast may have been applied by your health care provider. Make sure to keep it dry and follow up with an orthopedist as instructed.    - Keep splint dry and clean, do not get wet, cover with bag while in shower.  - Can loosen ACE wrap if feels too tight.   - Keep splint on until cleared by orthopedic provider  - Can ice foot through splint, elevate when stationary.   - Take Tylenol and / or motrin every 6-8 hours for pain/swelling  - NON WEIGHT BEARING ON RIGHT FOOT, please use crutches.       SEEK IMMEDIATE MEDICAL CARE IF YOU HAVE ANY OF THE FOLLOWING SYMPTOMS: numbness, tingling, increasing pain, or weakness in any part of the injured limb.

## 2022-04-17 NOTE — ED ADULT NURSE NOTE - NSICDXFAMILYHX_GEN_ALL_CORE_FT
FAMILY HISTORY:  Father  Still living? Unknown  Family history of colonic diverticulitis, Age at diagnosis: Age Unknown  Family history of Crohn's disease, Age at diagnosis: Age Unknown    Mother  Still living? Unknown  Family history of colonic diverticulitis, Age at diagnosis: Age Unknown

## 2022-04-17 NOTE — ED PROVIDER NOTE - PROGRESS NOTE DETAILS
fern pgy1: xray showed navicular fx, splint placed. Agree to dispo with ortho f/u. Crutches provided, aware needs to be NWB.

## 2022-04-18 ENCOUNTER — NON-APPOINTMENT (OUTPATIENT)
Age: 37
End: 2022-04-18

## 2022-04-19 NOTE — ED POST DISCHARGE NOTE - RESULT SUMMARY
IF: pt ankle and tib/fib xray resulted for read stating recommend dedicated foot xray. pt placed in posterior splint and was given cruthces and ortho fup. No need to contact pt as management wouldn't change - Breanne Gómez PA-C

## 2022-04-21 ENCOUNTER — APPOINTMENT (OUTPATIENT)
Dept: ORTHOPEDIC SURGERY | Facility: CLINIC | Age: 37
End: 2022-04-21
Payer: COMMERCIAL

## 2022-04-21 VITALS — HEIGHT: 63 IN | WEIGHT: 204 LBS | BODY MASS INDEX: 36.14 KG/M2

## 2022-04-21 DIAGNOSIS — S92.251A DISPLACED FRACTURE OF NAVICULAR [SCAPHOID] OF RIGHT FOOT, INITIAL ENCOUNTER FOR CLOSED FRACTURE: ICD-10-CM

## 2022-04-21 DIAGNOSIS — S93.491A SPRAIN OF OTHER LIGAMENT OF RIGHT ANKLE, INITIAL ENCOUNTER: ICD-10-CM

## 2022-04-21 LAB
APPEARANCE: CLEAR
BILIRUBIN URINE: NEGATIVE
BLOOD URINE: NEGATIVE
COLOR: YELLOW
ESTIMATED AVERAGE GLUCOSE: 103 MG/DL
GLUCOSE QUALITATIVE U: NEGATIVE
HBA1C MFR BLD HPLC: 5.2 %
KETONES URINE: NEGATIVE
LEUKOCYTE ESTERASE URINE: NEGATIVE
NITRITE URINE: NEGATIVE
PH URINE: 6.5
PROTEIN URINE: NEGATIVE
SPECIFIC GRAVITY URINE: 1.02
UROBILINOGEN URINE: NORMAL

## 2022-04-21 PROCEDURE — L4361: CPT

## 2022-04-21 PROCEDURE — 28450 TX TARSAL B1 FX W/O MNPJ EA: CPT

## 2022-04-21 PROCEDURE — 99214 OFFICE O/P EST MOD 30 MIN: CPT | Mod: 25

## 2022-04-21 PROCEDURE — 73630 X-RAY EXAM OF FOOT: CPT | Mod: RT

## 2022-04-21 NOTE — IMAGING
[Outside films reviewed] : Outside films reviewed [Right] : right foot [Weight -] : weightbearing [FreeTextEntry9] : navic avulsion  [de-identified] : navic avulsion  [de-identified] : navic avulsion fx

## 2022-04-21 NOTE — HISTORY OF PRESENT ILLNESS
[6] : 6 [Dull/Aching] : dull/aching [Nothing helps with pain getting better] : Nothing helps with pain getting better [de-identified] : 3/7/21: She reports "spraining" her right ankle 2 months ago, she rolled her ankle in a hole in her yard and it never got better. She then sprained her left ankle one month ago, no injury her ankle gave out on her while standing. Swelled up right away and immediate pain. She iced, used a CAM boot which she had a home. She reports a history of about 5 sprains on the right and 4 on the left. She reports intense pain with activity, but also burning pain even at rest. This is staring to interfere with her day to day activities. She cannot walk fast or exercise. She is not currently using any kind of brace. She last did PT for this 3 months ago, but she does do home exercises. reports mechanical symptoms and feelings of giving way. blood clot in brain recently dx'd on coumadin. no dm/tob. [facility] admin\par 3/15/21: continued pain. MRI f/up\par 4/12/21: had new injury 4 days ago to R ankle. no tx thus far. was inversion injury.\par \par 4/21/22: new injury 4 days when had inversion injury and felt crack. went to ed and placed in splint, but patient removed on own. now using aircast. ankle was doing well since last visit. neuro issues resolved - no longer on anticoag [] : no [FreeTextEntry1] : Rt Foot [FreeTextEntry7] : up the leg

## 2022-04-21 NOTE — PHYSICAL EXAM
[Right] : right foot and ankle [NL (40)] : plantar flexion 40 degrees [NL 30)] : inversion 30 degrees [NL (20)] : eversion 20 degrees [5___] : eversion 5[unfilled]/5 [2+] : dorsalis pedis pulse: 2+ [] : no achilles tendon tenderness [TWNoteComboBox7] : dorsiflexion 15 degrees

## 2022-04-21 NOTE — ASSESSMENT
[FreeTextEntry1] : wbat\par cam boot\par ice/eelvate\par nsaids prn\par f/up 3 wks w/ foot xray\par

## 2022-04-22 ENCOUNTER — APPOINTMENT (OUTPATIENT)
Dept: PAIN MANAGEMENT | Facility: CLINIC | Age: 37
End: 2022-04-22
Payer: COMMERCIAL

## 2022-04-22 VITALS
WEIGHT: 204 LBS | SYSTOLIC BLOOD PRESSURE: 115 MMHG | BODY MASS INDEX: 36.14 KG/M2 | HEIGHT: 63 IN | DIASTOLIC BLOOD PRESSURE: 80 MMHG | HEART RATE: 88 BPM

## 2022-04-22 PROCEDURE — 99212 OFFICE O/P EST SF 10 MIN: CPT

## 2022-04-22 NOTE — PHYSICAL EXAM
[General Appearance - Alert] : alert [General Appearance - Well-Appearing] : healthy appearing [Oriented To Time, Place, And Person] : oriented to person, place, and time [Affect] : the affect was normal [Mood] : the mood was normal [Cranial Nerves Facial (VII)] : face symmetrical [Cranial Nerves Accessory (XI - Cranial And Spinal)] : head turning and shoulder shrug symmetric [Cranial Nerves Hypoglossal (XII)] : there was no tongue deviation with protrusion [Motor Strength] : muscle strength was normal in all four extremities [Paresis Pronator Drift Right-Sided] : no pronator drift on the right [Paresis Pronator Drift Left-Sided] : no pronator drift on the left [Sclera] : the sclera and conjunctiva were normal [PERRL With Normal Accommodation] : pupils were equal in size, round, reactive to light, with normal accommodation [Extraocular Movements] : extraocular movements were intact [] : no respiratory distress

## 2022-04-22 NOTE — REVIEW OF SYSTEMS
[Fever] : no fever [Chills] : no chills [Chest Pain] : no chest pain [Palpitations] : no palpitations [Shortness Of Breath] : no shortness of breath [Wheezing] : no wheezing [Dizziness] : no dizziness [Fainting] : no fainting

## 2022-04-22 NOTE — HISTORY OF PRESENT ILLNESS
[Headache] : headache [Dizziness] : dizziness [Nausea] : nausea [Photophobia] : photophobia [Phonophobia] : phonophobia [___ Times Per Week] : [unfilled] times each week [FreeTextEntry1] : REturns today for a followup appt . \par Fractured right ankle last week .\par Headaches have been better .  Mostly related to stress and work . \par Denies vision changes.\par Mood has been good.

## 2022-05-23 ENCOUNTER — APPOINTMENT (OUTPATIENT)
Dept: PSYCHIATRY | Facility: CLINIC | Age: 37
End: 2022-05-23
Payer: COMMERCIAL

## 2022-05-23 ENCOUNTER — APPOINTMENT (OUTPATIENT)
Dept: INTERNAL MEDICINE | Facility: CLINIC | Age: 37
End: 2022-05-23
Payer: COMMERCIAL

## 2022-05-23 VITALS — HEIGHT: 63 IN | WEIGHT: 198 LBS | BODY MASS INDEX: 35.08 KG/M2

## 2022-05-23 VITALS — DIASTOLIC BLOOD PRESSURE: 80 MMHG | RESPIRATION RATE: 14 BRPM | HEART RATE: 72 BPM | SYSTOLIC BLOOD PRESSURE: 120 MMHG

## 2022-05-23 VITALS — WEIGHT: 198 LBS | HEIGHT: 63 IN | BODY MASS INDEX: 35.08 KG/M2

## 2022-05-23 DIAGNOSIS — R53.81 OTHER MALAISE: ICD-10-CM

## 2022-05-23 DIAGNOSIS — R53.83 OTHER MALAISE: ICD-10-CM

## 2022-05-23 DIAGNOSIS — E66.01 MORBID (SEVERE) OBESITY DUE TO EXCESS CALORIES: ICD-10-CM

## 2022-05-23 PROCEDURE — 99214 OFFICE O/P EST MOD 30 MIN: CPT | Mod: 95

## 2022-05-23 PROCEDURE — 99214 OFFICE O/P EST MOD 30 MIN: CPT

## 2022-05-23 NOTE — ASSESSMENT
[FreeTextEntry1] : Pt is medically stable to return to full time work. Patient without restrictions except no lifting greater than 5 pounds.\par \par Continue current medications.\par \par For routine physical due in 3 months\par \par

## 2022-05-23 NOTE — HISTORY OF PRESENT ILLNESS
[de-identified] : Pt presents for f/u evaluation of migraines, malaise, s/p abdominoplasty.\par She has her pain under control.\par Mood is better.\par MIgraines are very episodic on current medications.

## 2022-06-01 ENCOUNTER — APPOINTMENT (OUTPATIENT)
Dept: INTERNAL MEDICINE | Facility: CLINIC | Age: 37
End: 2022-06-01

## 2022-06-06 ENCOUNTER — APPOINTMENT (OUTPATIENT)
Dept: ORTHOPEDIC SURGERY | Facility: CLINIC | Age: 37
End: 2022-06-06

## 2022-06-15 ENCOUNTER — RX RENEWAL (OUTPATIENT)
Age: 37
End: 2022-06-15

## 2022-07-01 ENCOUNTER — APPOINTMENT (OUTPATIENT)
Dept: PAIN MANAGEMENT | Facility: CLINIC | Age: 37
End: 2022-07-01

## 2022-07-01 ENCOUNTER — APPOINTMENT (OUTPATIENT)
Dept: INTERNAL MEDICINE | Facility: CLINIC | Age: 37
End: 2022-07-01

## 2022-07-01 VITALS
DIASTOLIC BLOOD PRESSURE: 70 MMHG | WEIGHT: 197 LBS | HEIGHT: 63 IN | HEART RATE: 80 BPM | BODY MASS INDEX: 34.91 KG/M2 | SYSTOLIC BLOOD PRESSURE: 111 MMHG

## 2022-07-01 PROCEDURE — 99213 OFFICE O/P EST LOW 20 MIN: CPT

## 2022-07-01 PROCEDURE — 36415 COLL VENOUS BLD VENIPUNCTURE: CPT

## 2022-07-01 PROCEDURE — 99213 OFFICE O/P EST LOW 20 MIN: CPT | Mod: 25

## 2022-07-01 RX ORDER — TRAZODONE HYDROCHLORIDE 50 MG/1
50 TABLET ORAL
Qty: 30 | Refills: 0 | Status: COMPLETED | COMMUNITY
Start: 2022-01-26

## 2022-07-01 NOTE — PHYSICAL EXAM
[General Appearance - Alert] : alert [General Appearance - Well-Appearing] : healthy appearing [Oriented To Time, Place, And Person] : oriented to person, place, and time [Affect] : the affect was normal [Mood] : the mood was normal [Cranial Nerves Facial (VII)] : face symmetrical [Motor Strength] : muscle strength was normal in all four extremities [Involuntary Movements] : no involuntary movements were seen [Coordination - Dysmetria Impaired Finger-to-Nose Bilateral] : not present [Sclera] : the sclera and conjunctiva were normal [PERRL With Normal Accommodation] : pupils were equal in size, round, reactive to light, with normal accommodation [Extraocular Movements] : extraocular movements were intact [] : no respiratory distress [Edema] : there was no peripheral edema

## 2022-07-01 NOTE — ASSESSMENT
[FreeTextEntry1] : Check serum potassium level .\par Consider decreasing meds ?? \par RTO3-4 month , Dr Moreno  on site billed incident to service \par

## 2022-07-01 NOTE — HISTORY OF PRESENT ILLNESS
[FreeTextEntry1] : Pt returns today for a follow up appt. Has not had a migraine since April .\par Overall feeling good and tolerates medications well.

## 2022-07-02 LAB
ALBUMIN SERPL ELPH-MCNC: 4.2 G/DL
ALP BLD-CCNC: 75 U/L
ALT SERPL-CCNC: 16 U/L
ANION GAP SERPL CALC-SCNC: 12 MMOL/L
AST SERPL-CCNC: 20 U/L
BASOPHILS # BLD AUTO: 0.03 K/UL
BASOPHILS NFR BLD AUTO: 0.4 %
BILIRUB SERPL-MCNC: <0.2 MG/DL
BUN SERPL-MCNC: 12 MG/DL
CALCIUM SERPL-MCNC: 9 MG/DL
CHLORIDE SERPL-SCNC: 107 MMOL/L
CO2 SERPL-SCNC: 17 MMOL/L
CREAT SERPL-MCNC: 0.77 MG/DL
EGFR: 102 ML/MIN/1.73M2
EOSINOPHIL # BLD AUTO: 0.21 K/UL
EOSINOPHIL NFR BLD AUTO: 3 %
ERYTHROCYTE [SEDIMENTATION RATE] IN BLOOD BY WESTERGREN METHOD: 30 MM/HR
GLUCOSE SERPL-MCNC: 83 MG/DL
HCT VFR BLD CALC: 36.6 %
HGB BLD-MCNC: 11.4 G/DL
IMM GRANULOCYTES NFR BLD AUTO: 0.6 %
LYMPHOCYTES # BLD AUTO: 2.08 K/UL
LYMPHOCYTES NFR BLD AUTO: 30.1 %
MAN DIFF?: NORMAL
MCHC RBC-ENTMCNC: 27.1 PG
MCHC RBC-ENTMCNC: 31.1 GM/DL
MCV RBC AUTO: 87.1 FL
MONOCYTES # BLD AUTO: 0.47 K/UL
MONOCYTES NFR BLD AUTO: 6.8 %
NEUTROPHILS # BLD AUTO: 4.07 K/UL
NEUTROPHILS NFR BLD AUTO: 59.1 %
PLATELET # BLD AUTO: 303 K/UL
POTASSIUM SERPL-SCNC: 4.1 MMOL/L
PROT SERPL-MCNC: 7.1 G/DL
RBC # BLD: 4.2 M/UL
RBC # FLD: 13.5 %
SODIUM SERPL-SCNC: 136 MMOL/L
WBC # FLD AUTO: 6.9 K/UL

## 2022-07-05 NOTE — ASSESSMENT
[FreeTextEntry1] : Blood work was drawn and sent to the lab today. The patient has been instructed to call the office next week to discuss today's lab work.\par \par ALKA f/u suggested\par \par Pt will be following up with Neurology later this afternoon.

## 2022-07-05 NOTE — HISTORY OF PRESENT ILLNESS
[FreeTextEntry8] : Pt presents for evaluation - has been experiencing hot flashes for past few weeks - think she is going through early menopause.\par NO other acute complaints.\par Periods are regular.\par Poor libido

## 2022-07-06 LAB
FSH SERPL-MCNC: 7.3 IU/L
LH SERPL-ACNC: 5.6 IU/L
T4 FREE SERPL-MCNC: 1.1 NG/DL
T4 SERPL-MCNC: 7.4 UG/DL
TSH SERPL-ACNC: 3 UIU/ML

## 2022-07-07 ENCOUNTER — NON-APPOINTMENT (OUTPATIENT)
Age: 37
End: 2022-07-07

## 2022-07-08 ENCOUNTER — NON-APPOINTMENT (OUTPATIENT)
Age: 37
End: 2022-07-08

## 2022-07-08 LAB — ESTROGEN SERPL-MCNC: 121 PG/ML

## 2022-08-10 ENCOUNTER — APPOINTMENT (OUTPATIENT)
Dept: PSYCHIATRY | Facility: CLINIC | Age: 37
End: 2022-08-10

## 2022-08-10 PROCEDURE — 99214 OFFICE O/P EST MOD 30 MIN: CPT | Mod: 95

## 2022-09-09 ENCOUNTER — APPOINTMENT (OUTPATIENT)
Dept: PSYCHIATRY | Facility: CLINIC | Age: 37
End: 2022-09-09

## 2022-09-09 PROCEDURE — 99214 OFFICE O/P EST MOD 30 MIN: CPT | Mod: 95

## 2022-10-03 ENCOUNTER — APPOINTMENT (OUTPATIENT)
Dept: PAIN MANAGEMENT | Facility: CLINIC | Age: 37
End: 2022-10-03

## 2022-10-31 ENCOUNTER — APPOINTMENT (OUTPATIENT)
Dept: PSYCHIATRY | Facility: CLINIC | Age: 37
End: 2022-10-31

## 2022-10-31 PROCEDURE — 99214 OFFICE O/P EST MOD 30 MIN: CPT | Mod: 95

## 2022-10-31 NOTE — CURRENT PSYCHIATRIC SYMPTOMS
[Depressed Mood] : depressed mood [Anhedonia] : anhedonia [Guilt] : feeling guilty [Decreased Concentration] : decreased concentrating ability [Insomnia] : insomnia [Psychomotor Agitation] : psychomotor agitation [Euphoria] : euphoria [Highly Irritable] : high irritability [Distractibility] : distractibility [Buying Sprees] : buying sprees [Hallucination Visual] : visual hallucinations [Hallucination Auditory] : auditory hallucinations [Excessive Worry] : excessive worry [Ruminations] : ruminations [Re-experiencing] : re-experiencing [Restlessness] : restlessness [Hypochondriasis] : hypochondriasis [Panic] : panic  [de-identified] : hears someone walking and sees dark shawdows [de-identified] : denies [de-identified] : denies [de-identified] : denies
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
## 2022-10-31 NOTE — PAST MEDICAL HISTORY
[FreeTextEntry1] : H/o anxiety and depression\par \par Denies any inpatient hospitalization admission \par \par Past SI attempts: states she had thoughts and had plan but never did them for the sake of the kids\par \par Therapy: saw a therapist in 2013 for 1.5 years\par \par Psychiatrist: denies\par \par Medication trials: Prozac and Lexapro (did not help)\par \par Current medications: Cymbalta 60 (since 2013) and Wellbutrin  mg (2 years), Ambien 10 mg, Klonopin 0.5 BID PRN\par \par Firearms: denies\par Medical: Pseudotumor cerebri, venous sinus stenosis, herniated disk, glaucoma, and chronic migraines\par

## 2022-10-31 NOTE — DISCUSSION/SUMMARY
[FreeTextEntry1] : Assessment: Patient is a 35 yo  female with h/o depression and anxiety seen today for medication management. . Patient is compliant with medications and tolerating without any new reported side effects. I-stop reviewed and no issues noticed.\par \par I-STOP:\par Patient Name: Esha Murphy \par YOB: 1985 \par Address: [address] [place] [address] [place] [address] \par Sex: Female \par Rx Written	Rx Dispensed	Drug	Quantity	Days Supply	Prescriber Name	Prescriber Maritza #	Payment Method	Dispenser\par 12/20/2021	12/22/2021	clonazepam 0.5 mg tablet 	60	30	Andrade Flores MD	[id number]	Insurance	[id number]\par 09/23/2021	12/04/2021	zolpidem tartrate 10 mg tablet 	30	30	Andrade Flores MD	[id number]	Insurance	[id number]\par 11/20/2021	11/20/2021	pregabalin 50 mg capsule 	60	30	Andrade Flores MD	[id number]	Insurance	[id number]\par 09/23/2021	10/26/2021	zolpidem tartrate 10 mg tablet 	30	30	Andrade Flores MD	[id number]	Insurance	[id number]\par 10/25/2021	10/26/2021	clonazepam 0.5 mg tablet 	60	30	Andrade Flores MD	[id number]	Insurance	[id number]\par 10/08/2021	10/14/2021	pregabalin 50 mg capsule 	60	30	Noelle Noriega	[id number]	Insurance	[id number]\par 09/23/2021	09/25/2021	clonazepam 0.5 mg tablet 	60	30	Andrade Flores MD	[id number]	Insurance	[id number]\par 09/23/2021	09/25/2021	zolpidem tartrate 10 mg tablet 	30	30	Andrade Flores MD	[id number]	Insurance	[id number]\par 09/13/2021	09/14/2021	pregabalin 50 mg capsule 	60	30	Noelle Noriega	[id number]	Insurance	[id number]\par 08/14/2021	08/18/2021	clonazepam 0.5 mg tablet 	60	30	Andrade Flores MD	[id number]	Insurance	[id number]\par 06/09/2021	08/18/2021	zolpidem tartrate 10 mg tablet 	30	30	MazAndrade kessler MD	[id number]	Insurance	[id number]\par 06/09/2021	07/18/2021	zolpidem tartrate 10 mg tablet 	30	30	MazAndrade kessler MD	[id number]	Insurance	[id number]\par 07/14/2021	07/18/2021	clonazepam 0.5 mg tablet 	60	30	Andrade Flores MD	[id number]	Insurance	[id number]\par 07/09/2021	07/09/2021	hydrocodone-acetaminophen 5-325 mg tablet 	12	2	Alberto Ballard	[id number]	Insurance	[id number]\par 06/21/2021	06/21/2021	hydrocodone-acetaminophen 5-325 mg tablet 	18	3	Alberto Ballard	[id number]	Insurance	[id number]\par 06/09/2021	06/14/2021	zolpidem tartrate 10 mg tablet 	30	30	Andrade Flores MD	[id number]	Insurance	[id number]\par \par Patient Name: Esha Murphy \par YOB: 1985 \par Address: [address] [place], [place] [address] \par Sex: Female \par Rx Written	Rx Dispensed	Drug	Quantity	Days Supply	Prescriber Name	Prescriber Maritza #	Payment Method	Dispenser\par 08/18/2021	08/19/2021	pregabalin 25 mg capsule 	60	30	Noelle Noriega	[id number]	Insurance	[id number]\par 06/02/2021	06/04/2021	clonazepam 0.5 mg tablet 	60	30	Kristin Dunham (DO)	[id number]	Insurance	[id number]\par 03/04/2021	05/14/2021	zolpidem tartrate 10 mg tablet 	30	30	Andrade Flores MD	[id number]	Insurance	[id number]\par 03/04/2021	04/09/2021	zolpidem tartrate 10 mg tablet 	30	30	Andrade Flores MD	[id number]	Insurance	[id number]\par 03/27/2021	03/30/2021	clonazepam 0.5 mg tablet 	60	30	Andrade Flores MD	[id number]	Insurance	[id number]\par 03/04/2021	03/11/2021	zolpidem tartrate 10 mg tablet 	30	30	Andrade Flores MD	[id number]	Insurance	[id number]\par 11/30/2020	02/05/2021	zolpidem tartrate 10 mg tablet 	30	30	Andrade Flores MD	[id number]	Insurance	[id number]\par 02/05/2021	02/05/2021	clonazepam 0.5 mg tablet 	60	30	Andrade Flores MD	[id number]	Insurance	[id number]\par 11/30/2020	12/31/2020	zolpidem tartrate 10 mg tablet 	30	30	Andrade Flores MD	[id number]	Insurance	[id number]\par 12/23/2020	12/31/2020	alprazolam 0.5 mg tablet 	90	30	Andrade Flores MD	[id number]	Insurance	[id number]\par \par \par \par PLAN:\par Increase Abilify 5 to 7 mg PO QHS for depression augmentation\par Continue Trazodone 200 mg PO QHS for insomnia \par Continue Cymbalta 60 mg PO QHS for depression, anxiety, and neuropathic pain\par Continue Klonopin 0.5 mg PO BID PRN for anxiety. none prescribed as she has. \par Continue Wellbutrin  PO QAM for depression, low motivation, energy and concentration\par - Discussed risks and benefits of medications including side effects of GI and sexual side effects with SSRI. Alternative strategies including no intervention discussed with patient. Patient consents to current medications as prescribed.\par - Discussed with patient regarding importance of abstinence and sobriety from alcohol and drugs. Educated about relationship between worsening mood/anxiety symptoms and drug use and improvement of symptoms with abstinence. \par - Discussed about unpredictable effects including cardiorespiratory collapse from the combination of illicit drugs and prescribed medications. Patient verbalized understanding.\par - Patient understands to contact clinic prn with concerns and agrees to call 911 or go to nearest ER if symptoms worsen.\par - Next appointment made in 1 month. Patient left the office without any distress.\par
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
## 2022-11-07 ENCOUNTER — APPOINTMENT (OUTPATIENT)
Dept: INTERNAL MEDICINE | Facility: CLINIC | Age: 37
End: 2022-11-07

## 2022-11-07 PROCEDURE — 99213 OFFICE O/P EST LOW 20 MIN: CPT | Mod: 95

## 2022-11-11 NOTE — ASSESSMENT
[FreeTextEntry1] : Bromfed PRN\par Ventolin HFA q4\par F/U if symptoms do not resolve, or if they should change/worsen.\par

## 2022-11-11 NOTE — HISTORY OF PRESENT ILLNESS
[Home] : at home, [unfilled] , at the time of the visit. [Medical Office: ([facility])___] : at the medical office located in  [Verbal consent obtained from patient] : the patient, [unfilled] [Congestion] : congestion [Cough] : cough [Sore Throat] : sore throat

## 2022-11-11 NOTE — REVIEW OF SYSTEMS
[Fever] : no fever [Nasal Discharge] : nasal discharge [Sore Throat] : sore throat [Cough] : cough [Negative] : Cardiovascular

## 2022-11-19 ENCOUNTER — APPOINTMENT (OUTPATIENT)
Dept: INTERNAL MEDICINE | Facility: CLINIC | Age: 37
End: 2022-11-19

## 2022-11-19 VITALS
SYSTOLIC BLOOD PRESSURE: 110 MMHG | DIASTOLIC BLOOD PRESSURE: 70 MMHG | HEART RATE: 72 BPM | HEIGHT: 63 IN | RESPIRATION RATE: 14 BRPM

## 2022-11-19 PROCEDURE — 99213 OFFICE O/P EST LOW 20 MIN: CPT | Mod: 25

## 2022-11-27 NOTE — HISTORY OF PRESENT ILLNESS
[Congestion] : congestion [Cough] : cough [Sore Throat] : sore throat [FreeTextEntry8] : Pt presents for evaluation - has been experiencing URI symptoms - no relief with OTC analgesics nor prescribed inhalers\par no fever / chills\par No SOB\par

## 2022-11-27 NOTE — ASSESSMENT
[FreeTextEntry1] : Augmentin\par Advair\par Robitussin with codeine PRN\par \par F/U if symptoms do not resolve, or if they should change/worsen.\par

## 2022-12-02 ENCOUNTER — APPOINTMENT (OUTPATIENT)
Dept: PSYCHIATRY | Facility: CLINIC | Age: 37
End: 2022-12-02

## 2022-12-02 PROCEDURE — 99214 OFFICE O/P EST MOD 30 MIN: CPT | Mod: 95
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
## 2022-12-02 NOTE — CURRENT PSYCHIATRIC SYMPTOMS
[Depressed Mood] : depressed mood [Anhedonia] : anhedonia [Guilt] : feeling guilty [Decreased Concentration] : decreased concentrating ability [Insomnia] : insomnia [Psychomotor Agitation] : psychomotor agitation [Euphoria] : euphoria [Highly Irritable] : high irritability [Distractibility] : distractibility [Buying Sprees] : buying sprees [Hallucination Visual] : visual hallucinations [Hallucination Auditory] : auditory hallucinations [Excessive Worry] : excessive worry [Ruminations] : ruminations [Re-experiencing] : re-experiencing [Restlessness] : restlessness [Hypochondriasis] : hypochondriasis [Panic] : panic  [de-identified] : hears someone walking and sees dark shawdows [de-identified] : denies [de-identified] : denies [de-identified] : denies
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
## 2022-12-02 NOTE — DISCUSSION/SUMMARY
[FreeTextEntry1] : Assessment: Patient is a 37 yo  female with h/o depression and anxiety seen today for medication management. . Patient is compliant with medications and tolerating without any new reported side effects. I-stop reviewed and no issues noticed.\par \par I-STOP:\par Patient Name: Esha Murphy \par YOB: 1985 \par Address: [address] [place] [address] [facility] [place] [address] \par Sex: Female \par Rx Written	Rx Dispensed	Drug	Quantity	Days Supply	Prescriber Name	Prescriber Maritza #	Payment Method	Dispenser\par 12/20/2021	12/22/2021	clonazepam 0.5 mg tablet 	60	30	Andrade Flores MD	[id number]	Insurance	[id number]\par 09/23/2021	12/04/2021	zolpidem tartrate 10 mg tablet 	30	30	Andrade Flores MD	[id number]	Insurance	[id number]\par 11/20/2021	11/20/2021	pregabalin 50 mg capsule 	60	30	Andrade Flores MD	[id number]	Insurance	[id number]\par 09/23/2021	10/26/2021	zolpidem tartrate 10 mg tablet 	30	30	Anrdade Flores MD	[id number]	Insurance	[id number]\par 10/25/2021	10/26/2021	clonazepam 0.5 mg tablet 	60	30	Andrade Flores MD	[id number]	Insurance	[id number]\par 10/08/2021	10/14/2021	pregabalin 50 mg capsule 	60	30	Noelle Noriega	[id number]	Insurance	[id number]\par 09/23/2021	09/25/2021	clonazepam 0.5 mg tablet 	60	30	Andrade Flores MD	[id number]	Insurance	[id number]\par 09/23/2021	09/25/2021	zolpidem tartrate 10 mg tablet 	30	30	Andrade Flores MD	[id number]	Insurance	[id number]\par 09/13/2021	09/14/2021	pregabalin 50 mg capsule 	60	30	Noelle Noriega	[id number]	Insurance	[id number]\par 08/14/2021	08/18/2021	clonazepam 0.5 mg tablet 	60	30	Andrade Flores MD	[id number]	Insurance	[id number]\par 06/09/2021	08/18/2021	zolpidem tartrate 10 mg tablet 	30	30	MazAndrade kessler MD	[id number]	Insurance	[id number]\par 06/09/2021	07/18/2021	zolpidem tartrate 10 mg tablet 	30	30	MazAndrade kessler MD	[id number]	Insurance	[id number]\par 07/14/2021	07/18/2021	clonazepam 0.5 mg tablet 	60	30	Andrade Flores MD	[id number]	Insurance	[id number]\par 07/09/2021	07/09/2021	hydrocodone-acetaminophen 5-325 mg tablet 	12	2	Alberto Ballard	[id number]	Insurance	[id number]\par 06/21/2021	06/21/2021	hydrocodone-acetaminophen 5-325 mg tablet 	18	3	Alberto Ballard	[id number]	Insurance	[id number]\par 06/09/2021	06/14/2021	zolpidem tartrate 10 mg tablet 	30	30	Andrade Flores MD	[id number]	Insurance	[id number]\par \par Patient Name: Esha Murphy \par YOB: 1985 \par Address: [address] [place], [place] [id number] \par Sex: Female \par Rx Written	Rx Dispensed	Drug	Quantity	Days Supply	Prescriber Name	Prescriber Maritza #	Payment Method	Dispenser\par 08/18/2021	08/19/2021	pregabalin 25 mg capsule 	60	30	Noelle Noriega	[id number]	Insurance	[id number]\par 06/02/2021	06/04/2021	clonazepam 0.5 mg tablet 	60	30	Kristin Dunham (DO)	[id number]	Insurance	[id number]\par 03/04/2021	05/14/2021	zolpidem tartrate 10 mg tablet 	30	30	Andrade Flores MD	[id number]	Insurance	[id number]\par 03/04/2021	04/09/2021	zolpidem tartrate 10 mg tablet 	30	30	Andrade Flores MD	[id number]	Insurance	[id number]\par 03/27/2021	03/30/2021	clonazepam 0.5 mg tablet 	60	30	Andrade Flores MD	[id number]	Insurance	[id number]\par 03/04/2021	03/11/2021	zolpidem tartrate 10 mg tablet 	30	30	Andrade Flores MD	[id number]	Insurance	[id number]\par 11/30/2020	02/05/2021	zolpidem tartrate 10 mg tablet 	30	30	Andrade Flores MD	[id number]	Insurance	[id number]\par 02/05/2021	02/05/2021	clonazepam 0.5 mg tablet 	60	30	Andrade Flores MD	[id number]	Insurance	[id number]\par 11/30/2020	12/31/2020	zolpidem tartrate 10 mg tablet 	30	30	Andrade Flores MD	[id number]	Insurance	[id number]\par 12/23/2020	12/31/2020	alprazolam 0.5 mg tablet 	90	30	Andrade Flores MD	[id number]	Insurance	[id number]\par \par \par \par PLAN:\par Continue Abilify 7 mg PO QHS for depression augmentation\par Continue Trazodone 200 mg PO QHS for insomnia \par Continue Cymbalta 60 mg PO QHS for depression, anxiety, and neuropathic pain\par Continue Klonopin 0.5 mg PO BID PRN for anxiety. none prescribed as she has. \par Continue Wellbutrin  PO QAM for depression, low motivation, energy and concentration\par - Discussed risks and benefits of medications including side effects of GI and sexual side effects with SSRI. Alternative strategies including no intervention discussed with patient. Patient consents to current medications as prescribed.\par - Discussed with patient regarding importance of abstinence and sobriety from alcohol and drugs. Educated about relationship between worsening mood/anxiety symptoms and drug use and improvement of symptoms with abstinence. \par - Discussed about unpredictable effects including cardiorespiratory collapse from the combination of illicit drugs and prescribed medications. Patient verbalized understanding.\par - Patient understands to contact clinic prn with concerns and agrees to call 911 or go to nearest ER if symptoms worsen.\par - Next appointment made in 3 month. Patient left the office without any distress.\par

## 2022-12-12 ENCOUNTER — APPOINTMENT (OUTPATIENT)
Dept: PAIN MANAGEMENT | Facility: CLINIC | Age: 37
End: 2022-12-12

## 2022-12-15 ENCOUNTER — APPOINTMENT (OUTPATIENT)
Dept: PAIN MANAGEMENT | Facility: CLINIC | Age: 37
End: 2022-12-15

## 2022-12-16 ENCOUNTER — NON-APPOINTMENT (OUTPATIENT)
Age: 37
End: 2022-12-16

## 2023-01-10 ENCOUNTER — APPOINTMENT (OUTPATIENT)
Dept: INTERNAL MEDICINE | Facility: CLINIC | Age: 38
End: 2023-01-10
Payer: COMMERCIAL

## 2023-01-10 ENCOUNTER — NON-APPOINTMENT (OUTPATIENT)
Age: 38
End: 2023-01-10

## 2023-01-10 VITALS — TEMPERATURE: 98.1 F | OXYGEN SATURATION: 98 % | DIASTOLIC BLOOD PRESSURE: 80 MMHG | SYSTOLIC BLOOD PRESSURE: 120 MMHG

## 2023-01-10 PROCEDURE — 99214 OFFICE O/P EST MOD 30 MIN: CPT | Mod: 25

## 2023-01-10 RX ORDER — GUAIFENESIN AND CODEINE PHOSPHATE 10; 100 MG/5ML; MG/5ML
100-10 LIQUID ORAL
Qty: 1 | Refills: 0 | Status: DISCONTINUED | COMMUNITY
Start: 2022-11-19 | End: 2023-01-10

## 2023-01-10 RX ORDER — BROMPHENIRAMINE MALEATE, PSEUDOEPHEDRINE HYDROCHLORIDE, 2; 30; 10 MG/5ML; MG/5ML; MG/5ML
30-2-10 SYRUP ORAL
Qty: 1 | Refills: 0 | Status: DISCONTINUED | COMMUNITY
Start: 2022-11-07 | End: 2023-01-10

## 2023-01-10 RX ORDER — AMOXICILLIN AND CLAVULANATE POTASSIUM 875; 125 MG/1; MG/1
875-125 TABLET, COATED ORAL
Qty: 20 | Refills: 0 | Status: DISCONTINUED | COMMUNITY
Start: 2022-11-19 | End: 2023-01-10

## 2023-01-10 RX ORDER — TRIAMCINOLONE ACETONIDE 1 MG/G
0.1 PASTE DENTAL
Qty: 6 | Refills: 0 | Status: ACTIVE | COMMUNITY
Start: 2023-01-10 | End: 1900-01-01

## 2023-01-10 NOTE — HISTORY OF PRESENT ILLNESS
[FreeTextEntry8] : cc: sore throat , cough \par \par URI - sx including sore throat & cough x 8 days \par \par initially had URI in 11/22 that was improving and had slight rare cough\par \par 8 days ago developed new cough - worse at night - no relief with otc mucinex dm prn \par +epistaxis - has been usign saline nasal sprays - no discolored rhinorrhea\par also +sore throat - no relief with cepacol prn - takes alleve rarely which helps \par \par

## 2023-01-10 NOTE — PHYSICAL EXAM
[No Acute Distress] : no acute distress [Well Nourished] : well nourished [No Lymphadenopathy] : no lymphadenopathy [Supple] : supple [No Accessory Muscle Use] : no accessory muscle use [Clear to Auscultation] : lungs were clear to auscultation bilaterally [Regular Rhythm] : with a regular rhythm [Normal S1, S2] : normal S1 and S2 [No Murmur] : no murmur heard [Normal Affect] : the affect was normal [Normal Insight/Judgement] : insight and judgment were intact [de-identified] : erythema of posterior oropharynx; slight serous effusion of b/l tm

## 2023-01-10 NOTE — REVIEW OF SYSTEMS
[Earache] : no earache [Shortness Of Breath] : no shortness of breath [Cough] : cough [Negative] : Constitutional [FreeTextEntry4] : .

## 2023-01-11 DIAGNOSIS — U07.1 COVID-19: ICD-10-CM

## 2023-01-11 LAB
INFLUENZA A RESULT: NOT DETECTED
INFLUENZA B RESULT: NOT DETECTED
RESP SYN VIRUS RESULT: NOT DETECTED
SARS-COV-2 RESULT: DETECTED

## 2023-01-13 LAB — BACTERIA THROAT CULT: NORMAL

## 2023-02-03 ENCOUNTER — APPOINTMENT (OUTPATIENT)
Dept: PAIN MANAGEMENT | Facility: CLINIC | Age: 38
End: 2023-02-03
Payer: COMMERCIAL

## 2023-02-03 DIAGNOSIS — G44.52 NEW DAILY PERSISTENT HEADACHE (NDPH): ICD-10-CM

## 2023-02-03 DIAGNOSIS — G43.909 MIGRAINE, UNSPECIFIED, NOT INTRACTABLE, W/OUT STATUS MIGRAINOSUS: ICD-10-CM

## 2023-02-03 PROCEDURE — 99214 OFFICE O/P EST MOD 30 MIN: CPT | Mod: 95

## 2023-02-03 RX ORDER — TOPIRAMATE 25 MG/1
25 TABLET, FILM COATED ORAL DAILY
Qty: 90 | Refills: 3 | Status: DISCONTINUED | COMMUNITY
Start: 2021-03-27 | End: 2023-02-03

## 2023-02-03 RX ORDER — FREMANEZUMAB-VFRM 225 MG/1.5ML
225 INJECTION SUBCUTANEOUS
Qty: 1.5 | Refills: 0 | Status: DISCONTINUED | COMMUNITY
Start: 2021-09-15 | End: 2023-02-03

## 2023-02-07 ENCOUNTER — APPOINTMENT (OUTPATIENT)
Dept: PSYCHIATRY | Facility: CLINIC | Age: 38
End: 2023-02-07

## 2023-02-11 ENCOUNTER — NON-APPOINTMENT (OUTPATIENT)
Age: 38
End: 2023-02-11

## 2023-02-16 ENCOUNTER — APPOINTMENT (OUTPATIENT)
Dept: PSYCHIATRY | Facility: CLINIC | Age: 38
End: 2023-02-16
Payer: COMMERCIAL

## 2023-02-16 DIAGNOSIS — F90.0 ATTENTION-DEFICIT HYPERACTIVITY DISORDER, PREDOMINANTLY INATTENTIVE TYPE: ICD-10-CM

## 2023-02-16 PROCEDURE — 99214 OFFICE O/P EST MOD 30 MIN: CPT | Mod: 95

## 2023-02-21 ENCOUNTER — APPOINTMENT (OUTPATIENT)
Dept: INTERNAL MEDICINE | Facility: CLINIC | Age: 38
End: 2023-02-21
Payer: COMMERCIAL

## 2023-02-21 ENCOUNTER — NON-APPOINTMENT (OUTPATIENT)
Age: 38
End: 2023-02-21

## 2023-02-21 ENCOUNTER — LABORATORY RESULT (OUTPATIENT)
Age: 38
End: 2023-02-21

## 2023-02-21 VITALS
HEIGHT: 63.5 IN | DIASTOLIC BLOOD PRESSURE: 70 MMHG | BODY MASS INDEX: 37.1 KG/M2 | SYSTOLIC BLOOD PRESSURE: 110 MMHG | WEIGHT: 212 LBS

## 2023-02-21 DIAGNOSIS — Z87.39 PERSONAL HISTORY OF OTHER DISEASES OF THE MUSCULOSKELETAL SYSTEM AND CONNECTIVE TISSUE: ICD-10-CM

## 2023-02-21 DIAGNOSIS — R23.2 FLUSHING: ICD-10-CM

## 2023-02-21 DIAGNOSIS — R21 RASH AND OTHER NONSPECIFIC SKIN ERUPTION: ICD-10-CM

## 2023-02-21 DIAGNOSIS — M62.89 OTHER SPECIFIED DISORDERS OF MUSCLE: ICD-10-CM

## 2023-02-21 DIAGNOSIS — Z87.898 PERSONAL HISTORY OF OTHER SPECIFIED CONDITIONS: ICD-10-CM

## 2023-02-21 DIAGNOSIS — R74.8 ABNORMAL LEVELS OF OTHER SERUM ENZYMES: ICD-10-CM

## 2023-02-21 DIAGNOSIS — R10.10 UPPER ABDOMINAL PAIN, UNSPECIFIED: ICD-10-CM

## 2023-02-21 DIAGNOSIS — Z01.818 ENCOUNTER FOR OTHER PREPROCEDURAL EXAMINATION: ICD-10-CM

## 2023-02-21 DIAGNOSIS — R22.0 LOCALIZED SWELLING, MASS AND LUMP, HEAD: ICD-10-CM

## 2023-02-21 DIAGNOSIS — Z23 ENCOUNTER FOR IMMUNIZATION: ICD-10-CM

## 2023-02-21 PROBLEM — G44.52 NEW DAILY PERSISTENT HEADACHE (NDPH): Status: ACTIVE | Noted: 2021-09-03

## 2023-02-21 PROCEDURE — G0444 DEPRESSION SCREEN ANNUAL: CPT | Mod: 59

## 2023-02-21 PROCEDURE — 99395 PREV VISIT EST AGE 18-39: CPT | Mod: 25

## 2023-02-21 PROCEDURE — 90471 IMMUNIZATION ADMIN: CPT

## 2023-02-21 PROCEDURE — 93000 ELECTROCARDIOGRAM COMPLETE: CPT | Mod: 59

## 2023-02-21 PROCEDURE — 90715 TDAP VACCINE 7 YRS/> IM: CPT

## 2023-02-21 RX ORDER — HYDROCODONE BITARTRATE AND HOMATROPINE METHYLBROMIDE 1.5; 5 MG/5ML; MG/5ML
5-1.5 SOLUTION ORAL EVERY 8 HOURS
Qty: 300 | Refills: 0 | Status: DISCONTINUED | COMMUNITY
Start: 2023-01-10 | End: 2023-02-21

## 2023-02-21 RX ORDER — DICLOFENAC SODIUM 75 MG/1
75 TABLET, DELAYED RELEASE ORAL
Qty: 40 | Refills: 0 | Status: DISCONTINUED | COMMUNITY
Start: 2022-12-30 | End: 2023-02-21

## 2023-02-21 RX ORDER — PROMETHAZINE HYDROCHLORIDE AND CODEINE PHOSPHATE 6.25; 1 MG/5ML; MG/5ML
6.25-1 SOLUTION ORAL EVERY 8 HOURS
Qty: 250 | Refills: 0 | Status: DISCONTINUED | COMMUNITY
Start: 2023-01-10 | End: 2023-02-21

## 2023-02-21 RX ORDER — PEN NEEDLE, DIABETIC 32 GX 1/4"
32G X 6 MM NEEDLE, DISPOSABLE MISCELLANEOUS
Qty: 100 | Refills: 1 | Status: DISCONTINUED | COMMUNITY
Start: 2019-06-18 | End: 2023-02-21

## 2023-02-21 RX ORDER — INDOMETHACIN 50 MG/1
50 CAPSULE ORAL
Qty: 60 | Refills: 3 | Status: DISCONTINUED | COMMUNITY
Start: 2021-09-15 | End: 2023-02-21

## 2023-02-21 RX ORDER — FLUTICASONE PROPIONATE AND SALMETEROL 250; 50 UG/1; UG/1
250-50 POWDER RESPIRATORY (INHALATION)
Qty: 1 | Refills: 0 | Status: DISCONTINUED | COMMUNITY
Start: 2022-11-19 | End: 2023-02-21

## 2023-02-21 RX ORDER — BENZONATATE 200 MG/1
200 CAPSULE ORAL EVERY 8 HOURS
Qty: 30 | Refills: 0 | Status: DISCONTINUED | COMMUNITY
Start: 2023-01-14 | End: 2023-02-21

## 2023-02-21 RX ORDER — PREGABALIN 50 MG/1
50 CAPSULE ORAL
Qty: 180 | Refills: 0 | Status: DISCONTINUED | COMMUNITY
Start: 2021-09-03 | End: 2023-02-21

## 2023-02-21 NOTE — ASSESSMENT
[FreeTextEntry1] : 37F c migraine headache, depression, ADHD, obesity, tear of R. ankle, covid-19 infection (1/23) here for cpe\par \par ghm - check fasting bw\par physical ecg performed - ekg nsr \par advised GI f/u - due for repeat colonoscpy with Dr. Eddie Martinez\par advised covid booster - pt declined \par utd with gyn exam and PAP smear\par utd with optho exam\par due for dental exam\par advisd to see derm for check of nevus on R. posterior buttock & FBSE \par \par rtc in 1year for cpe or prn \par \par

## 2023-02-21 NOTE — HISTORY OF PRESENT ILLNESS
[FreeTextEntry1] : \par CPE  [de-identified] : \par Diet: is back to watching her diet - doing intermittent fasting \par \par Exercise: limited 2/2 R. ankle injury \par \par ~7 days ago went to  after tripped, sprained R. ankle and L. wrist - resolved\par \par \par

## 2023-02-21 NOTE — PHYSICAL EXAM
[General Appearance - Alert] : alert [General Appearance - In No Acute Distress] : in no acute distress [General Appearance - Well Nourished] : well nourished [General Appearance - Well Developed] : well developed [General Appearance - Well-Appearing] : healthy appearing [Oriented To Time, Place, And Person] : oriented to person, place, and time [Impaired Insight] : insight and judgment were intact [Affect] : the affect was normal [Mood] : the mood was normal [Memory Recent] : recent memory was not impaired [Memory Remote] : remote memory was not impaired [Person] : oriented to person [Place] : oriented to place [Time] : oriented to time [Short Term Intact] : short term memory intact [Remote Intact] : remote memory intact [Registration Intact] : recent registration memory intact [Concentration Intact] : normal concentrating ability [Visual Intact] : visual attention was ~T not ~L decreased [Naming Objects] : no difficulty naming common objects [Fluency] : fluency intact [Comprehension] : comprehension intact [Current Events] : adequate knowledge of current events [Past History] : adequate knowledge of personal past history [Cranial Nerves Oculomotor (III)] : extraocular motion intact [Cranial Nerves Facial (VII)] : face symmetrical [Cranial Nerves Vestibulocochlear (VIII)] : hearing was intact bilaterally [Cranial Nerves Accessory (XI - Cranial And Spinal)] : head turning and shoulder shrug symmetric [Cranial Nerves Hypoglossal (XII)] : there was no tongue deviation with protrusion [Motor Handedness Right-Handed] : the patient is right hand dominant [Motor Strength Upper Extremities Bilaterally] : strength was normal in both upper extremities [Dysdiadochokinesia Bilaterally] : not present [Sclera] : the sclera and conjunctiva were normal [No HERMELINDO] : no internuclear ophthalmoplegia [Strabismus] : no strabismus was seen [Outer Ear] : the ears and nose were normal in appearance [Neck Cervical Mass (___cm)] : no neck mass was observed [Exaggerated Use Of Accessory Muscles For Inspiration] : no accessory muscle use [Edema] : there was no peripheral edema [Involuntary Movements] : no involuntary movements were seen [Skin Color & Pigmentation] : normal skin color and pigmentation [] : no rash

## 2023-02-21 NOTE — PHYSICAL EXAM
[No Acute Distress] : no acute distress [Well Nourished] : well nourished [PERRL] : pupils equal round and reactive to light [Normal Oropharynx] : the oropharynx was normal [Normal TMs] : both tympanic membranes were normal [Normal Nasal Mucosa] : the nasal mucosa was normal [No Lymphadenopathy] : no lymphadenopathy [Supple] : supple [Thyroid Normal, No Nodules] : the thyroid was normal and there were no nodules present [No Accessory Muscle Use] : no accessory muscle use [Clear to Auscultation] : lungs were clear to auscultation bilaterally [Regular Rhythm] : with a regular rhythm [Normal S1, S2] : normal S1 and S2 [No Murmur] : no murmur heard [No Edema] : there was no peripheral edema [Normal Appearance] : normal in appearance [No Nipple Discharge] : no nipple discharge [No Axillary Lymphadenopathy] : no axillary lymphadenopathy [Soft] : abdomen soft [Non Tender] : non-tender [Non-distended] : non-distended [No Masses] : no abdominal mass palpated [No HSM] : no HSM [Normal Bowel Sounds] : normal bowel sounds [Normal Supraclavicular Nodes] : no supraclavicular lymphadenopathy [Normal Axillary Nodes] : no axillary lymphadenopathy [Normal Posterior Cervical Nodes] : no posterior cervical lymphadenopathy [Normal Affect] : the affect was normal [Normal Insight/Judgement] : insight and judgment were intact [de-identified] : nevi on RAmanda morales

## 2023-02-21 NOTE — HISTORY OF PRESENT ILLNESS
[FreeTextEntry1] : Pt notes that she had been doing well so she had stopped the acetazolamide in Dec and started to get headaches back about 6-8 days/ month.  Does have appt to see opthomologist and neuroophthalmologist soon due to non specific visual changes she has had\par Had returned to the acetazolamide over the last several days and starting to feel more improved.\par No clear side effects.  Unclear parasthesias.\par Denies other new medical issues. [Chronic Headache] : chronic headache [Tingling] : tingling [> 4 hours] : > 4 hours [Worsened] : The patient reports ~his/her~ symptoms since the last visit have worsened

## 2023-02-21 NOTE — ASSESSMENT
[FreeTextEntry1] : to return to use of 500mg diamox...\par call in 2 months and consider lowering to 250mg then f/u in 4 months.

## 2023-02-22 ENCOUNTER — NON-APPOINTMENT (OUTPATIENT)
Age: 38
End: 2023-02-22

## 2023-02-22 LAB
25(OH)D3 SERPL-MCNC: 24.7 NG/ML
ALBUMIN SERPL ELPH-MCNC: 4.6 G/DL
ALP BLD-CCNC: 83 U/L
ALT SERPL-CCNC: 19 U/L
ANION GAP SERPL CALC-SCNC: 13 MMOL/L
APPEARANCE: CLEAR
AST SERPL-CCNC: 20 U/L
BASOPHILS # BLD AUTO: 0.04 K/UL
BASOPHILS NFR BLD AUTO: 0.5 %
BILIRUB SERPL-MCNC: 0.2 MG/DL
BILIRUBIN URINE: NEGATIVE
BLOOD URINE: ABNORMAL
BUN SERPL-MCNC: 11 MG/DL
CALCIUM SERPL-MCNC: 9.6 MG/DL
CHLORIDE SERPL-SCNC: 100 MMOL/L
CHOLEST SERPL-MCNC: 225 MG/DL
CO2 SERPL-SCNC: 23 MMOL/L
COLOR: YELLOW
CREAT SERPL-MCNC: 0.69 MG/DL
EGFR: 115 ML/MIN/1.73M2
EOSINOPHIL # BLD AUTO: 0.23 K/UL
EOSINOPHIL NFR BLD AUTO: 2.6 %
ESTIMATED AVERAGE GLUCOSE: 100 MG/DL
GLUCOSE QUALITATIVE U: NEGATIVE
GLUCOSE SERPL-MCNC: 77 MG/DL
HBA1C MFR BLD HPLC: 5.1 %
HCT VFR BLD CALC: 43.2 %
HDLC SERPL-MCNC: 60 MG/DL
HGB BLD-MCNC: 14 G/DL
HIV1+2 AB SPEC QL IA.RAPID: NONREACTIVE
IMM GRANULOCYTES NFR BLD AUTO: 0.3 %
KETONES URINE: NEGATIVE
LDLC SERPL CALC-MCNC: 144 MG/DL
LEUKOCYTE ESTERASE URINE: NEGATIVE
LYMPHOCYTES # BLD AUTO: 2.36 K/UL
LYMPHOCYTES NFR BLD AUTO: 27.1 %
MAN DIFF?: NORMAL
MCHC RBC-ENTMCNC: 27.9 PG
MCHC RBC-ENTMCNC: 32.4 GM/DL
MCV RBC AUTO: 86.1 FL
MONOCYTES # BLD AUTO: 0.49 K/UL
MONOCYTES NFR BLD AUTO: 5.6 %
NEUTROPHILS # BLD AUTO: 5.56 K/UL
NEUTROPHILS NFR BLD AUTO: 63.9 %
NITRITE URINE: NEGATIVE
NONHDLC SERPL-MCNC: 166 MG/DL
PH URINE: 6
PLATELET # BLD AUTO: 301 K/UL
POTASSIUM SERPL-SCNC: 3.8 MMOL/L
PROT SERPL-MCNC: 7.8 G/DL
PROTEIN URINE: NEGATIVE
RBC # BLD: 5.02 M/UL
RBC # FLD: 13.6 %
SODIUM SERPL-SCNC: 136 MMOL/L
SPECIFIC GRAVITY URINE: 1.02
T PALLIDUM AB SER QL IA: NEGATIVE
T3 SERPL-MCNC: 112 NG/DL
T4 FREE SERPL-MCNC: 1.3 NG/DL
TRIGL SERPL-MCNC: 107 MG/DL
TSH SERPL-ACNC: 1.49 UIU/ML
UROBILINOGEN URINE: NORMAL
WBC # FLD AUTO: 8.71 K/UL

## 2023-03-15 NOTE — ED ADULT NURSE NOTE - P[facility]
Fletcher [id number]    Pre- Op Diagnosis:  Foreign body right ear  Post op Diagnosis:  Same  Procedure: Removal of foreign body from right ear  Surgeon: Harish Zhou MD  Anesthesia: General  Assistant: None  Specimen: None  EBL: Minimal  Complications: None  Bood adminitered: none  Grafts/implants: none  Findings: bead in right ear canal with ear canal laceration, intact eardrum      Indication for procedure: Patient with left ear foreign body    Procedure note:    The Patient was brought to the operating room and placed in the supine position. General anesthesia was induced and the patient was mask ventilated.The right ear was examined and there was a foreign body in the ear canal which was removed with a right angle pick. Some old blood was removed with suction and the eardrum was noted to be intact. 5 drops of floxin were placed in the ear. The patient was then awakened and taken to recovery in stable condition.    Harish Zhou MD    delivery delivered  2012

## 2023-03-16 ENCOUNTER — APPOINTMENT (OUTPATIENT)
Dept: PSYCHIATRY | Facility: CLINIC | Age: 38
End: 2023-03-16
Payer: COMMERCIAL

## 2023-03-16 PROCEDURE — 99214 OFFICE O/P EST MOD 30 MIN: CPT | Mod: 95

## 2023-03-16 RX ORDER — DEXTROAMPHETAMINE SACCHARATE, AMPHETAMINE ASPARTATE MONOHYDRATE, DEXTROAMPHETAMINE SULFATE AND AMPHETAMINE SULFATE 1.25; 1.25; 1.25; 1.25 MG/1; MG/1; MG/1; MG/1
5 CAPSULE, EXTENDED RELEASE ORAL
Qty: 30 | Refills: 0 | Status: COMPLETED | COMMUNITY
Start: 2023-02-16 | End: 2023-03-16

## 2023-03-16 NOTE — DISCUSSION/SUMMARY
[FreeTextEntry1] : Assessment: Patient is a 37 yo  female with h/o depression and anxiety seen today for medication management. . Patient is compliant with medications and tolerating without any new reported side effects. I-stop reviewed and no issues noticed.\par \par I-STOP:\par Patient Name: Esha Murphy \par YOB: 1985 \par Address: [address] [place] [address] [place] [facility] [place] [address] \par Sex: Female \par Rx Written	Rx Dispensed	Drug	Quantity	Days Supply	Prescriber Name	Prescriber Maritza #	Payment Method	Dispenser\par 12/20/2021	12/22/2021	clonazepam 0.5 mg tablet 	60	30	Andrade Flores MD	[id number]	Insurance	[id number]\par 09/23/2021	12/04/2021	zolpidem tartrate 10 mg tablet 	30	30	Andrade Flores MD	[id number]	Insurance	[id number]\par 11/20/2021	11/20/2021	pregabalin 50 mg capsule 	60	30	Andrade Flores MD	[id number]	Insurance	[id number]\par 09/23/2021	10/26/2021	zolpidem tartrate 10 mg tablet 	30	30	Andrade Flores MD	[id number]	Insurance	[id number]\par 10/25/2021	10/26/2021	clonazepam 0.5 mg tablet 	60	30	Andrade Flores MD	[id number]	Insurance	[id number]\par 10/08/2021	10/14/2021	pregabalin 50 mg capsule 	60	30	Noelle Noriega	[id number]	Insurance	[id number]\par 09/23/2021	09/25/2021	clonazepam 0.5 mg tablet 	60	30	Andrade Flores MD	[id number]	Insurance	[id number]\par 09/23/2021	09/25/2021	zolpidem tartrate 10 mg tablet 	30	30	Andrade Flores MD	[id number]	Insurance	[id number]\par 09/13/2021	09/14/2021	pregabalin 50 mg capsule 	60	30	Noelle Noriega	[id number]	Insurance	[id number]\par 08/14/2021	08/18/2021	clonazepam 0.5 mg tablet 	60	30	Andrade Flores MD	[id number]	Insurance	[id number]\par 06/09/2021	08/18/2021	zolpidem tartrate 10 mg tablet 	30	30	MazAndrade kessler MD	[id number]	Insurance	[id number]\par 06/09/2021	07/18/2021	zolpidem tartrate 10 mg tablet 	30	30	MazAndrade kessler MD	[id number]	Insurance	[id number]\par 07/14/2021	07/18/2021	clonazepam 0.5 mg tablet 	60	30	Andrade Flores MD	[id number]	Insurance	[id number]\par 07/09/2021	07/09/2021	hydrocodone-acetaminophen 5-325 mg tablet 	12	2	Alberto Ballard	[id number]	Insurance	[id number]\par 06/21/2021	06/21/2021	hydrocodone-acetaminophen 5-325 mg tablet 	18	3	Alberto Ballard	[id number]	Insurance	[id number]\par 06/09/2021	06/14/2021	zolpidem tartrate 10 mg tablet 	30	30	Andrade Flores MD	[id number]	Insurance	[id number]\par \par Patient Name: Esha Murphy \par YOB: 1985 \par Address: [address] [place], [place] [id number] \par Sex: Female \par Rx Written	Rx Dispensed	Drug	Quantity	Days Supply	Prescriber Name	Prescriber Maritza #	Payment Method	Dispenser\par 08/18/2021	08/19/2021	pregabalin 25 mg capsule 	60	30	Noelle Noriega	[id number]	Insurance	[id number]\par 06/02/2021	06/04/2021	clonazepam 0.5 mg tablet 	60	30	Krisitn Dunham (DO)	[id number]	Insurance	[id number]\par 03/04/2021	05/14/2021	zolpidem tartrate 10 mg tablet 	30	30	Andrade Flores MD	[id number]	Insurance	[id number]\par 03/04/2021	04/09/2021	zolpidem tartrate 10 mg tablet 	30	30	Andrade Flores MD	[id number]	Insurance	[id number]\par 03/27/2021	03/30/2021	clonazepam 0.5 mg tablet 	60	30	Andrade Flores MD	[id number]	Insurance	[id number]\par 03/04/2021	03/11/2021	zolpidem tartrate 10 mg tablet 	30	30	Andrade Flores MD	[id number]	Insurance	[id number]\par 11/30/2020	02/05/2021	zolpidem tartrate 10 mg tablet 	30	30	Andrade Flores MD	[id number]	Insurance	[id number]\par 02/05/2021	02/05/2021	clonazepam 0.5 mg tablet 	60	30	Andrade Flores MD	[id number]	Insurance	[id number]\par 11/30/2020	12/31/2020	zolpidem tartrate 10 mg tablet 	30	30	Andrade Flores MD	[id number]	Insurance	[id number]\par 12/23/2020	12/31/2020	alprazolam 0.5 mg tablet 	90	30	Andrade Flores MD	[id number]	Insurance	[id number]\par \par \par \par PLAN:\par Start Adderall 5 mg PO QD for ADHD\par Continue Abilify 7 mg PO QHS for depression augmentation\par Continue Trazodone 200 mg PO QHS for insomnia \par Continue Cymbalta 60 mg PO QHS for depression, anxiety, and neuropathic pain\par Continue Klonopin 0.5 mg PO BID PRN for anxiety. none prescribed as she has. \par Continue Wellbutrin  PO QAM for depression, low motivation, energy and concentration\par - Discussed risks and benefits of medications including side effects of GI and sexual side effects with SSRI. Alternative strategies including no intervention discussed with patient. Patient consents to current medications as prescribed.\par - Discussed with patient regarding importance of abstinence and sobriety from alcohol and drugs. Educated about relationship between worsening mood/anxiety symptoms and drug use and improvement of symptoms with abstinence. \par - Discussed about unpredictable effects including cardiorespiratory collapse from the combination of illicit drugs and prescribed medications. Patient verbalized understanding.\par - Patient understands to contact clinic prn with concerns and agrees to call 911 or go to nearest ER if symptoms worsen.\par - Next appointment made in 1 month. Patient left the office without any distress.\par

## 2023-03-16 NOTE — HISTORY OF PRESENT ILLNESS
[Home] : at home, [unfilled] , at the time of the visit. [Medical Office: ([facility])___] : at the medical office located in  [Verbal consent obtained from patient] : the patient, [unfilled] [de-identified] : No medication changes 3 months ago. patient states she si having a terrible time concentrating. Less focus, attention, and states it is is affecting her job. \par \par Mood is much better less depression and anxiety. No Klonopin this month.\par Sleep: 6-8 hours with the Trazodone 200 mg. \par Appetite, energy, and motivation are all improved. Concentration is decreased. States is a affecting her job and her life. Denies any AVH, SI or HI. \par \par
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
## 2023-03-16 NOTE — DISCUSSION/SUMMARY
[FreeTextEntry1] : Assessment: Patient is a 37 yo  female with h/o depression and anxiety seen today for medication management. . Patient is compliant with medications and tolerating without any new reported side effects. I-stop reviewed and no issues noticed.\par \par I-STOP:\par Patient Name: Esha Murphy \par YOB: 1985 \par Address: [address] [place] [address] [place] [facility] [place] [address] \par Sex: Female \par Rx Written	Rx Dispensed	Drug	Quantity	Days Supply	Prescriber Name	Prescriber Maritza #	Payment Method	Dispenser\par 12/20/2021	12/22/2021	clonazepam 0.5 mg tablet 	60	30	Andrade Flores MD	[id number]	Insurance	[id number]\par 09/23/2021	12/04/2021	zolpidem tartrate 10 mg tablet 	30	30	Andrade Flores MD	[id number]	Insurance	[id number]\par 11/20/2021	11/20/2021	pregabalin 50 mg capsule 	60	30	Andrade Flores MD	[id number]	Insurance	[id number]\par 09/23/2021	10/26/2021	zolpidem tartrate 10 mg tablet 	30	30	Andrade Flores MD	[id number]	Insurance	[id number]\par 10/25/2021	10/26/2021	clonazepam 0.5 mg tablet 	60	30	Andrade Flores MD	[id number]	Insurance	[id number]\par 10/08/2021	10/14/2021	pregabalin 50 mg capsule 	60	30	Noelle Noriega	[id number]	Insurance	[id number]\par 09/23/2021	09/25/2021	clonazepam 0.5 mg tablet 	60	30	Andrade Flores MD	[id number]	Insurance	[id number]\par 09/23/2021	09/25/2021	zolpidem tartrate 10 mg tablet 	30	30	Andrade Flores MD	[id number]	Insurance	[id number]\par 09/13/2021	09/14/2021	pregabalin 50 mg capsule 	60	30	Noelle Noriega	[id number]	Insurance	[id number]\par 08/14/2021	08/18/2021	clonazepam 0.5 mg tablet 	60	30	Andrade Flores MD	[id number]	Insurance	[id number]\par 06/09/2021	08/18/2021	zolpidem tartrate 10 mg tablet 	30	30	MazAndrade kessler MD	[id number]	Insurance	[id number]\par 06/09/2021	07/18/2021	zolpidem tartrate 10 mg tablet 	30	30	MazAndrade kessler MD	[id number]	Insurance	[id number]\par 07/14/2021	07/18/2021	clonazepam 0.5 mg tablet 	60	30	Andrade Flores MD	[id number]	Insurance	[id number]\par 07/09/2021	07/09/2021	hydrocodone-acetaminophen 5-325 mg tablet 	12	2	Alberto Ballard	[id number]	Insurance	[id number]\par 06/21/2021	06/21/2021	hydrocodone-acetaminophen 5-325 mg tablet 	18	3	Alberto Ballard	[id number]	Insurance	[id number]\par 06/09/2021	06/14/2021	zolpidem tartrate 10 mg tablet 	30	30	Andrade Flores MD	[id number]	Insurance	[id number]\par \par Patient Name: Esha Murphy \par YOB: 1985 \par Address: [address] [place], [place] [id number] \par Sex: Female \par Rx Written	Rx Dispensed	Drug	Quantity	Days Supply	Prescriber Name	Prescriber Maritza #	Payment Method	Dispenser\par 08/18/2021	08/19/2021	pregabalin 25 mg capsule 	60	30	Noelle Noriega	[id number]	Insurance	[id number]\par 06/02/2021	06/04/2021	clonazepam 0.5 mg tablet 	60	30	Kristin Dunham (DO)	[id number]	Insurance	[id number]\par 03/04/2021	05/14/2021	zolpidem tartrate 10 mg tablet 	30	30	Andrade Flores MD	[id number]	Insurance	[id number]\par 03/04/2021	04/09/2021	zolpidem tartrate 10 mg tablet 	30	30	Andrade Flores MD	[id number]	Insurance	[id number]\par 03/27/2021	03/30/2021	clonazepam 0.5 mg tablet 	60	30	Andrade Flores MD	[id number]	Insurance	[id number]\par 03/04/2021	03/11/2021	zolpidem tartrate 10 mg tablet 	30	30	Andrade Flores MD	[id number]	Insurance	[id number]\par 11/30/2020	02/05/2021	zolpidem tartrate 10 mg tablet 	30	30	Andrade Flores MD	[id number]	Insurance	[id number]\par 02/05/2021	02/05/2021	clonazepam 0.5 mg tablet 	60	30	Andrade Flores MD	[id number]	Insurance	[id number]\par 11/30/2020	12/31/2020	zolpidem tartrate 10 mg tablet 	30	30	Andrade Flores MD	[id number]	Insurance	[id number]\par 12/23/2020	12/31/2020	alprazolam 0.5 mg tablet 	90	30	Andrade Flores MD	[id number]	Insurance	[id number]\par \par \par \par PLAN:\par D/C Adderall 5 mg PO QD for ADHD\par Start Vyvanse 10 mg PO QD for ADHD\par Start Cogentin 0.1 mg PO QHS for sweating. \par Continue Abilify 7 mg PO QHS for depression augmentation\par Continue Trazodone 200 mg PO QHS for insomnia \par Continue Cymbalta 60 mg PO QHS for depression, anxiety, and neuropathic pain\par Continue Klonopin 0.5 mg PO BID PRN for anxiety. none prescribed as she has. \par Continue Wellbutrin  PO QAM for depression, low motivation, energy and concentration\par - Discussed risks and benefits of medications including side effects of GI and sexual side effects with SSRI. Alternative strategies including no intervention discussed with patient. Patient consents to current medications as prescribed.\par - Patient understands to contact clinic prn with concerns and agrees to call 911 or go to nearest ER if symptoms worsen.\par - Next appointment made in 1 month. Patient left the office without any distress.\par

## 2023-03-16 NOTE — HISTORY OF PRESENT ILLNESS
[de-identified] : Last month Adderall 5 mg was started on the patient. States "It did nothing for me." Still has lack of energy, and decreased concentration. \par \par Mood is much better less depression and anxiety. No Klonopin this month.\par Sleep: 7-8 hours with the Trazodone 200 mg. States she si sweating at night. Drench of sweat. \par Appetite is increased. Energy, concentrating, and motivation are all decreased. Denies any AVH, SI or HI. \par \par  [Home] : at home, [unfilled] , at the time of the visit. [Medical Office: ([facility])___] : at the medical office located in  [Verbal consent obtained from patient] : the patient, [unfilled]

## 2023-04-13 ENCOUNTER — APPOINTMENT (OUTPATIENT)
Dept: PSYCHIATRY | Facility: CLINIC | Age: 38
End: 2023-04-13
Payer: COMMERCIAL

## 2023-04-13 DIAGNOSIS — F50.81 BINGE EATING DISORDER: ICD-10-CM

## 2023-04-13 PROCEDURE — 99214 OFFICE O/P EST MOD 30 MIN: CPT | Mod: 95

## 2023-04-13 NOTE — DISCUSSION/SUMMARY
[FreeTextEntry1] : Assessment: Patient is a 37 yo  female with h/o depression and anxiety seen today for medication management. . Patient is compliant with medications and tolerating without any new reported side effects. I-stop reviewed and no issues noticed.\par \par I-STOP:\par Patient Name: Esha Murphy \par YOB: 1985 \par Address: [address] [place] [address] [place] [address] \par Sex: Female \par Rx Written	Rx Dispensed	Drug	Quantity	Days Supply	Prescriber Name	Prescriber Maritza #	Payment Method	Dispenser\par 12/20/2021	12/22/2021	clonazepam 0.5 mg tablet 	60	30	Andrade Flores MD	[id number]	Insurance	[id number]\par 09/23/2021	12/04/2021	zolpidem tartrate 10 mg tablet 	30	30	Andrade Flores MD	[id number]	Insurance	[id number]\par 11/20/2021	11/20/2021	pregabalin 50 mg capsule 	60	30	Andrade Flores MD	[id number]	Insurance	[id number]\par 09/23/2021	10/26/2021	zolpidem tartrate 10 mg tablet 	30	30	Andrade Flores MD	[id number]	Insurance	[id number]\par 10/25/2021	10/26/2021	clonazepam 0.5 mg tablet 	60	30	Andrade Flores MD	[id number]	Insurance	[id number]\par 10/08/2021	10/14/2021	pregabalin 50 mg capsule 	60	30	Noelle Noriega	[id number]	Insurance	[id number]\par 09/23/2021	09/25/2021	clonazepam 0.5 mg tablet 	60	30	Andrade Flores MD	[id number]	Insurance	[id number]\par 09/23/2021	09/25/2021	zolpidem tartrate 10 mg tablet 	30	30	Andrade Flores MD	[id number]	Insurance	[id number]\par 09/13/2021	09/14/2021	pregabalin 50 mg capsule 	60	30	Noelle Noriega	[id number]	Insurance	[id number]\par 08/14/2021	08/18/2021	clonazepam 0.5 mg tablet 	60	30	Andrade Flores MD	[id number]	Insurance	[id number]\par 06/09/2021	08/18/2021	zolpidem tartrate 10 mg tablet 	30	30	MazAndrade kessler MD	[id number]	Insurance	[id number]\par 06/09/2021	07/18/2021	zolpidem tartrate 10 mg tablet 	30	30	MazAndrade kessler MD	[id number]	Insurance	[id number]\par 07/14/2021	07/18/2021	clonazepam 0.5 mg tablet 	60	30	Andrade Flores MD	[id number]	Insurance	[id number]\par 07/09/2021	07/09/2021	hydrocodone-acetaminophen 5-325 mg tablet 	12	2	Alberto Ballard	[id number]	Insurance	[id number]\par 06/21/2021	06/21/2021	hydrocodone-acetaminophen 5-325 mg tablet 	18	3	Alberto Ballard	[id number]	Insurance	[id number]\par 06/09/2021	06/14/2021	zolpidem tartrate 10 mg tablet 	30	30	Andrade Flores MD	[id number]	Insurance	[id number]\par \par Patient Name: Esha Murphy \par YOB: 1985 \par Address: [address] [place], [place] [id number] \par Sex: Female \par Rx Written	Rx Dispensed	Drug	Quantity	Days Supply	Prescriber Name	Prescriber Maritza #	Payment Method	Dispenser\par 08/18/2021	08/19/2021	pregabalin 25 mg capsule 	60	30	Noelle Noriega	[id number]	Insurance	[id number]\par 06/02/2021	06/04/2021	clonazepam 0.5 mg tablet 	60	30	Kristin Dunham (DO)	[id number]	Insurance	[id number]\par 03/04/2021	05/14/2021	zolpidem tartrate 10 mg tablet 	30	30	Andrade Flores MD	[id number]	Insurance	[id number]\par 03/04/2021	04/09/2021	zolpidem tartrate 10 mg tablet 	30	30	Andrade Flores MD	[id number]	Insurance	[id number]\par 03/27/2021	03/30/2021	clonazepam 0.5 mg tablet 	60	30	Andrade Flores MD	[id number]	Insurance	[id number]\par 03/04/2021	03/11/2021	zolpidem tartrate 10 mg tablet 	30	30	Andrade Flores MD	[id number]	Insurance	[id number]\par 11/30/2020	02/05/2021	zolpidem tartrate 10 mg tablet 	30	30	Andrade Flores MD	[id number]	Insurance	[id number]\par 02/05/2021	02/05/2021	clonazepam 0.5 mg tablet 	60	30	Andrade Flores MD	[id number]	Insurance	[id number]\par 11/30/2020	12/31/2020	zolpidem tartrate 10 mg tablet 	30	30	Andrade Flores MD	[id number]	Insurance	[id number]\par 12/23/2020	12/31/2020	alprazolam 0.5 mg tablet 	90	30	Andrade Flores MD	[id number]	Insurance	[id number]\par \par \par \par PLAN:\par D/C Adderall 5 mg PO QD for ADHD\par Increase Vyvanse 10 to 20 mg PO QD for ADHD and binge eating disorder\par Continue Cogentin 0.1 mg PO QHS for sweating. \par Continue Abilify 7 mg PO QHS for depression augmentation\par Continue Trazodone 200 mg PO QHS for insomnia \par Continue Cymbalta 60 mg PO QHS for depression, anxiety, and neuropathic pain\par Continue Klonopin 0.5 mg PO BID PRN for anxiety. none prescribed as she has. \par Continue Wellbutrin  PO QAM for depression, low motivation, energy and concentration\par - Discussed risks and benefits of medications including side effects of GI and sexual side effects with SSRI. Alternative strategies including no intervention discussed with patient. Patient consents to current medications as prescribed.\par - Patient understands to contact clinic prn with concerns and agrees to call 911 or go to nearest ER if symptoms worsen.\par - Next appointment made in 1 month. Patient left the office without any distress.\par

## 2023-04-13 NOTE — HISTORY OF PRESENT ILLNESS
[de-identified] : Patient is here for 1 month followup visit via telehealth\par \par Last month Vyvanse 10 mg and Cogentin 0.1 mg was started on the patient. Patient states she is not sweating any more with the Cogentin. States she is still binge eating on sweets on the Vyvanse. She has more energy on the Vyvanse than she did on the Adderall. "I see a difference with this medication than the Adderall."\par \par Mood: is much better less depression and anxiety. No Klonopin this month.\par Sleep: 6-7 hours with the Trazodone 200 mg. States she has less sweating at night.  \par Appetite is increased. Eating sweets. Energy, concentrating, and motivation are all better.. Denies any AVH, SI or HI. \par \par  [Home] : at home, [unfilled] , at the time of the visit. [Medical Office: ([facility])___] : at the medical office located in  [Verbal consent obtained from patient] : the patient, [unfilled]
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
## 2023-04-13 NOTE — PHYSICAL EXAM
[None] : none [Anxious] : anxious [Dysphoric] : dysphoric [Normal] : alert, oriented to person, place, and time [Fair] : fair [FreeTextEntry8] : "I'm depressed." better  [FreeTextEntry9] : better

## 2023-04-20 RX ORDER — UBROGEPANT 100 MG/1
100 TABLET ORAL
Qty: 2 | Refills: 5 | Status: ACTIVE | COMMUNITY
Start: 2021-09-15 | End: 1900-01-01

## 2023-04-30 NOTE — HISTORY OF PRESENT ILLNESS
[de-identified] : pt has been stressed a great deal from the pandemic.\par \par Pt has had migraine / headaches daily for the past month.\par Has seen neurology Dr Noriega - MRI showed possible venous sinus stenosis - was also advised to have IUD removed, which was done this past Wed.\par Has seen neuro-opthalmology who did not see any evidence of papilledema.\par To see Dr Noriega in two days - may need LP to assess intracranial pressure.\par Will also be getting second opinion on Wed with Dr Castillo.\par \par Pt is very anxious and depressed.\par Crying a lot.\par Very overwhelmed with fear of her diagnosis as well as increasing work stress.
5

## 2023-05-01 ENCOUNTER — NON-APPOINTMENT (OUTPATIENT)
Age: 38
End: 2023-05-01

## 2023-05-01 ENCOUNTER — APPOINTMENT (OUTPATIENT)
Dept: PAIN MANAGEMENT | Facility: CLINIC | Age: 38
End: 2023-05-01
Payer: COMMERCIAL

## 2023-05-01 VITALS
WEIGHT: 213 LBS | SYSTOLIC BLOOD PRESSURE: 114 MMHG | HEART RATE: 104 BPM | DIASTOLIC BLOOD PRESSURE: 72 MMHG | BODY MASS INDEX: 37.27 KG/M2 | HEIGHT: 63.5 IN

## 2023-05-01 PROCEDURE — 99214 OFFICE O/P EST MOD 30 MIN: CPT

## 2023-05-01 NOTE — PHYSICAL EXAM
[General Appearance - Alert] : alert [General Appearance - In No Acute Distress] : in no acute distress [General Appearance - Well Nourished] : well nourished [General Appearance - Well Developed] : well developed [General Appearance - Well-Appearing] : healthy appearing [Oriented To Time, Place, And Person] : oriented to person, place, and time [Impaired Insight] : insight and judgment were intact [Affect] : the affect was normal [Mood] : the mood was normal [Memory Recent] : recent memory was not impaired [Memory Remote] : remote memory was not impaired [Person] : oriented to person [Place] : oriented to place [Time] : oriented to time [Short Term Intact] : short term memory intact [Remote Intact] : remote memory intact [Registration Intact] : recent registration memory intact [Concentration Intact] : normal concentrating ability [Visual Intact] : visual attention was ~T not ~L decreased [Naming Objects] : no difficulty naming common objects [Fluency] : fluency intact [Comprehension] : comprehension intact [Current Events] : adequate knowledge of current events [Past History] : adequate knowledge of personal past history [Cranial Nerves Oculomotor (III)] : extraocular motion intact [Cranial Nerves Vestibulocochlear (VIII)] : hearing was intact bilaterally [Cranial Nerves Facial (VII)] : face symmetrical [Cranial Nerves Accessory (XI - Cranial And Spinal)] : head turning and shoulder shrug symmetric [Cranial Nerves Hypoglossal (XII)] : there was no tongue deviation with protrusion [Motor Handedness Right-Handed] : the patient is right hand dominant [Motor Strength Upper Extremities Bilaterally] : strength was normal in both upper extremities [Dysdiadochokinesia Bilaterally] : not present [Sclera] : the sclera and conjunctiva were normal [No HERMELINDO] : no internuclear ophthalmoplegia [Strabismus] : no strabismus was seen [Outer Ear] : the ears and nose were normal in appearance [Neck Cervical Mass (___cm)] : no neck mass was observed [Exaggerated Use Of Accessory Muscles For Inspiration] : no accessory muscle use [Involuntary Movements] : no involuntary movements were seen [Edema] : there was no peripheral edema [Skin Color & Pigmentation] : normal skin color and pigmentation [] : no rash

## 2023-05-01 NOTE — HISTORY OF PRESENT ILLNESS
[FreeTextEntry1] : Pt returns today for a followup appt . Taking Diamox 500mg BID. Migraine sarted to return in February 2x/ week. \par Ubrelvy is sometimes helpful. Will also take Aleve. \par Pt now on Vyvanse. \par Overall health has been good.\par Denies any new migraine symptoms. \par Due for an appt with Neuro- opthalmology.  [Chronic Headache] : chronic headache [Tingling] : tingling [> 4 hours] : > 4 hours [Worsened] : The patient reports ~his/her~ symptoms since the last visit have worsened

## 2023-05-10 ENCOUNTER — APPOINTMENT (OUTPATIENT)
Dept: PSYCHIATRY | Facility: CLINIC | Age: 38
End: 2023-05-10
Payer: COMMERCIAL

## 2023-05-10 PROCEDURE — 99214 OFFICE O/P EST MOD 30 MIN: CPT

## 2023-05-10 NOTE — DISCUSSION/SUMMARY
[FreeTextEntry1] : Assessment: Patient is a 35 yo  female with h/o depression and anxiety seen today for medication management. . Patient is compliant with medications and tolerating without any new reported side effects. I-stop reviewed and no issues noticed.\par \par I-STOP:\par Patient Name: Esha Murphy \par YOB: 1985 \par Address: [address] [place] [address] [place] [facility] [place] [address] \par Sex: Female \par Rx Written	Rx Dispensed	Drug	Quantity	Days Supply	Prescriber Name	Prescriber Maritza #	Payment Method	Dispenser\par 12/20/2021	12/22/2021	clonazepam 0.5 mg tablet 	60	30	Andrade Flores MD	[id number]	Insurance	[id number]\par 09/23/2021	12/04/2021	zolpidem tartrate 10 mg tablet 	30	30	Andrade Flores MD	[id number]	Insurance	[id number]\par 11/20/2021	11/20/2021	pregabalin 50 mg capsule 	60	30	Andrade Flores MD	[id number]	Insurance	[id number]\par 09/23/2021	10/26/2021	zolpidem tartrate 10 mg tablet 	30	30	Andrade Flores MD	[id number]	Insurance	[id number]\par 10/25/2021	10/26/2021	clonazepam 0.5 mg tablet 	60	30	Andrade Flores MD	[id number]	Insurance	[id number]\par 10/08/2021	10/14/2021	pregabalin 50 mg capsule 	60	30	Noelle Noriega	[id number]	Insurance	[id number]\par 09/23/2021	09/25/2021	clonazepam 0.5 mg tablet 	60	30	Andrade Flores MD	[id number]	Insurance	[id number]\par 09/23/2021	09/25/2021	zolpidem tartrate 10 mg tablet 	30	30	Andrade Flroes MD	[id number]	Insurance	[id number]\par 09/13/2021	09/14/2021	pregabalin 50 mg capsule 	60	30	Noelle Noriega	[id number]	Insurance	[id number]\par 08/14/2021	08/18/2021	clonazepam 0.5 mg tablet 	60	30	Andrade Flores MD	[id number]	Insurance	[id number]\par 06/09/2021	08/18/2021	zolpidem tartrate 10 mg tablet 	30	30	MazAndrade kessler MD	[id number]	Insurance	[id number]\par 06/09/2021	07/18/2021	zolpidem tartrate 10 mg tablet 	30	30	MazAndrade kessler MD	[id number]	Insurance	[id number]\par 07/14/2021	07/18/2021	clonazepam 0.5 mg tablet 	60	30	Andrade Flores MD	[id number]	Insurance	[id number]\par 07/09/2021	07/09/2021	hydrocodone-acetaminophen 5-325 mg tablet 	12	2	Alberto Ballard	[id number]	Insurance	[id number]\par 06/21/2021	06/21/2021	hydrocodone-acetaminophen 5-325 mg tablet 	18	3	Alberto Ballard	[id number]	Insurance	[id number]\par 06/09/2021	06/14/2021	zolpidem tartrate 10 mg tablet 	30	30	Andrade Flores MD	[id number]	Insurance	[id number]\par \par Patient Name: Esha Murphy \par YOB: 1985 \par Address: [address] [place], [place] [phone] \par Sex: Female \par Rx Written	Rx Dispensed	Drug	Quantity	Days Supply	Prescriber Name	Prescriber Maritza #	Payment Method	Dispenser\par 08/18/2021	08/19/2021	pregabalin 25 mg capsule 	60	30	Noelle Noriega	[id number]	Insurance	[facility] [id number]\par 06/02/2021	06/04/2021	clonazepam 0.5 mg tablet 	60	30	Kristin Dunham (DO)	[id number]	Insurance	[id number]\par 03/04/2021	05/14/2021	zolpidem tartrate 10 mg tablet 	30	30	Andrade Flores MD	[id number]	Insurance	[id number]\par 03/04/2021	04/09/2021	zolpidem tartrate 10 mg tablet 	30	30	Andrade Flores MD	[id number]	Insurance	[id number]\par 03/27/2021	03/30/2021	clonazepam 0.5 mg tablet 	60	30	Andrade Flores MD	[id number]	Insurance	[id number]\par 03/04/2021	03/11/2021	zolpidem tartrate 10 mg tablet 	30	30	Andrade Flores MD	[id number]	Insurance	[id number]\par 11/30/2020	02/05/2021	zolpidem tartrate 10 mg tablet 	30	30	Andrade Flores MD	[id number]	Insurance	[id number]\par 02/05/2021	02/05/2021	clonazepam 0.5 mg tablet 	60	30	Andrade Flores MD	[id number]	Insurance	[id number]\par 11/30/2020	12/31/2020	zolpidem tartrate 10 mg tablet 	30	30	Andrade Flores MD	[id number]	Insurance	[id number]\par 12/23/2020	12/31/2020	alprazolam 0.5 mg tablet 	90	30	Andrade Flores MD	[id number]	Insurance	[id number]\par \par \par \par PLAN:\par D/C Adderall 5 mg PO QD for ADHD\par Increase Vyvanse 20 to 30 mg PO QD for ADHD and binge eating disorder\par Continue Cogentin 0.1 mg PO QHS for sweating. \par Continue Abilify 7 mg PO QHS for depression augmentation\par Continue Trazodone 200 mg PO QHS for insomnia \par Continue Cymbalta 60 mg PO QHS for depression, anxiety, and neuropathic pain\par Continue Klonopin 0.5 mg PO BID PRN for anxiety. none prescribed as she has. \par Continue Wellbutrin  PO QAM for depression, low motivation, energy and concentration\par - Discussed risks and benefits of medications including side effects of GI and sexual side effects with SSRI. Alternative strategies including no intervention discussed with patient. Patient consents to current medications as prescribed.\par - Patient understands to contact clinic prn with concerns and agrees to call 911 or go to nearest ER if symptoms worsen.\par - Next appointment made in 1 month. Patient left the office without any distress.\par
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
## 2023-05-10 NOTE — HISTORY OF PRESENT ILLNESS
[de-identified] : \par Patient is here in the office for face to face interview with writer for 1 month follow-up visit.\par \par \par Last month Vyvanse 10 was increased to 20 mg. States concentration is better on the increase of the dose. \par States has been feeling tired throughout the day. States she is taking the Wellbutrin  both in the morning. Patient was told to take it at 10am and 2pm. \par \par Mood: less depression. Anxiety is there but it is manageable. NO Klonopin\par Sleepin-8 hours per night. Groggy. No sweating with the Cogentin. Takes Trazodone 200 mg. \par Appetite is increased more at night times especially for sweets. \par Energy is decreased as she feels tired. \par Concentration and motivation is a little better. Not 100% yet. Denies any AVH, SI or HI. No Drug holiday as she works 7 days a week.  [Home] : at home, [unfilled] , at the time of the visit. [Medical Office: ([facility])___] : at the medical office located in  [Verbal consent obtained from patient] : the patient, [unfilled]

## 2023-05-11 NOTE — ED PROVIDER NOTE - ADDITIONAL NOTES AND INSTRUCTIONS:
Please excuse the above individual from employment and/or activities that involve weight bearing until above date or when cleared by medical providers. Odomzo Counseling- I discussed with the patient the risks of Odomzo including but not limited to nausea, vomiting, diarrhea, constipation, weight loss, changes in the sense of taste, decreased appetite, muscle spasms, and hair loss.  The patient verbalized understanding of the proper use and possible adverse effects of Odomzo.  All of the patient's questions and concerns were addressed.

## 2023-05-12 NOTE — FAMILY HISTORY
Psychoeducation Group Note    PATIENT'S NAME: Nancy Montemayor  MRN:   [id number]  :   1960  ACCT. NUMBER: [id number]  DATE OF SERVICE: 23  START TIME: 11:00 AM  END TIME: 11:50 AM  FACILITATOR: Nancy Martins LICSW  TOPIC: MH Wellness Group: Health Maintenance         [facility] Day Treatment  TRACK: 4A    NUMBER OF PARTICIPANTS: 6    Summary of Group / Topics Discussed:  Health Maintenance: Weekend planning: Patients were given time to complete a weekend plan of what they will do to promote wellness and sobriety over the weekend when they do not have the structure of group. Patients were encouraged to review progress on their treatment goals and were challenged to identify ways to work toward meeting them. Patients identified and discussed foreseeable barriers to success over the weekend and then developed a plan to overcome them. Patients reviewed their distress coping skills and social support network and discussed this with the group.       Patient Session Goals / Objectives:    ?    Identified activities to engage in that promote balance in wellness  ?    Distinguished possible barriers to success over the weekend and created a plan to overcome them  ?    Listed distress coping skills and identified social support network to utilize if in crisis during the weekend        Patient Participation / Response:  Fully participated with the group by sharing personal reflections / insights and openly received / provided feedback with other participants.    Demonstrated understanding of topics discussed through group discussion and participation and Verbalized understanding of health maintenance topic    Treatment Plan:  Patient has a current master individualized treatment plan.  See Epic treatment plan for more information.    RAMIREZ Rosas       [FreeTextEntry1] : mother: undiagnosed depression

## 2023-05-30 ENCOUNTER — APPOINTMENT (OUTPATIENT)
Dept: INTERNAL MEDICINE | Facility: CLINIC | Age: 38
End: 2023-05-30
Payer: COMMERCIAL

## 2023-05-30 VITALS
DIASTOLIC BLOOD PRESSURE: 78 MMHG | SYSTOLIC BLOOD PRESSURE: 110 MMHG | OXYGEN SATURATION: 97 % | TEMPERATURE: 98.7 F | RESPIRATION RATE: 12 BRPM

## 2023-05-30 DIAGNOSIS — J02.9 ACUTE PHARYNGITIS, UNSPECIFIED: ICD-10-CM

## 2023-05-30 LAB — S PYO AG SPEC QL IA: NEGATIVE

## 2023-05-30 PROCEDURE — 87880 STREP A ASSAY W/OPTIC: CPT | Mod: QW

## 2023-05-30 PROCEDURE — 99213 OFFICE O/P EST LOW 20 MIN: CPT | Mod: 25

## 2023-05-31 ENCOUNTER — TRANSCRIPTION ENCOUNTER (OUTPATIENT)
Age: 38
End: 2023-05-31

## 2023-05-31 PROBLEM — J02.9 ACUTE PHARYNGITIS: Status: RESOLVED | Noted: 2023-05-30 | Resolved: 2023-06-29

## 2023-05-31 LAB — SARS-COV-2 N GENE NPH QL NAA+PROBE: NOT DETECTED

## 2023-05-31 NOTE — PHYSICAL EXAM
[No Acute Distress] : no acute distress [Well Nourished] : well nourished [Normal TMs] : both tympanic membranes were normal [Normal Nasal Mucosa] : the nasal mucosa was normal [No Accessory Muscle Use] : no accessory muscle use [Clear to Auscultation] : lungs were clear to auscultation bilaterally [Regular Rhythm] : with a regular rhythm [Normal S1, S2] : normal S1 and S2 [No Murmur] : no murmur heard [No Edema] : there was no peripheral edema [Normal Affect] : the affect was normal [Normal Insight/Judgement] : insight and judgment were intact [de-identified] : moderate erythema of oropharynx with small 0.2cm exudate left upper palate

## 2023-05-31 NOTE — PHYSICAL EXAM
[No Acute Distress] : no acute distress [Well Nourished] : well nourished [Normal TMs] : both tympanic membranes were normal [Normal Nasal Mucosa] : the nasal mucosa was normal [No Accessory Muscle Use] : no accessory muscle use [Clear to Auscultation] : lungs were clear to auscultation bilaterally [Regular Rhythm] : with a regular rhythm [Normal S1, S2] : normal S1 and S2 [No Murmur] : no murmur heard [No Edema] : there was no peripheral edema [Normal Affect] : the affect was normal [Normal Insight/Judgement] : insight and judgment were intact [de-identified] : moderate erythema of oropharynx with small 0.2cm exudate left upper palate

## 2023-05-31 NOTE — HISTORY OF PRESENT ILLNESS
[FreeTextEntry8] : cc: sore throat x 2 days a/w cough \par \par +2 days of sore throat and cough \par +chills last night\par no fever \par took alleve which didn't help and "mucinex" throat drop \par \par \par

## 2023-06-02 LAB — BACTERIA THROAT CULT: NORMAL

## 2023-06-05 ENCOUNTER — APPOINTMENT (OUTPATIENT)
Dept: PAIN MANAGEMENT | Facility: CLINIC | Age: 38
End: 2023-06-05

## 2023-06-07 RX ORDER — PANTOPRAZOLE 40 MG/1
40 TABLET, DELAYED RELEASE ORAL
Qty: 90 | Refills: 0 | Status: ACTIVE | COMMUNITY
Start: 2020-01-17 | End: 1900-01-01

## 2023-06-09 ENCOUNTER — APPOINTMENT (OUTPATIENT)
Dept: PSYCHIATRY | Facility: CLINIC | Age: 38
End: 2023-06-09
Payer: COMMERCIAL

## 2023-06-09 PROCEDURE — 99214 OFFICE O/P EST MOD 30 MIN: CPT | Mod: 95

## 2023-06-09 NOTE — HISTORY OF PRESENT ILLNESS
[de-identified] : \par Patient is here for 1 month followup visit via telehealth\par \par Last month Vyvanse 20 was increased to 30 mg. States concentration is better on the increase of the dose but feel t did not get rid of her cravings of sweets especially at nights. \par Mood: less depression. Anxiety is there but it is manageable. No Klonopin\par Sleepin-8 hours per night. Groggy. No sweating with the Cogentin. Takes Trazodone 200 mg. \par Appetite is increased more at night times especially for sweets. BUt states throughout the day she is eating 1-2 meals per day. \par Energy is better on the increase of the Vyvanse \par Concentration and motivation is much better. Denies any AVH, SI or HI. Does drug holiday 1 day per week.  [Home] : at home, [unfilled] , at the time of the visit. [Medical Office: ([facility])___] : at the medical office located in  [Verbal consent obtained from patient] : the patient, [unfilled]
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
## 2023-06-09 NOTE — DISCUSSION/SUMMARY
[FreeTextEntry1] : Assessment: Patient is a 35 yo  female with h/o depression and anxiety seen today for medication management. . Patient is compliant with medications and tolerating without any new reported side effects. I-stop reviewed and no issues noticed.\par \par I-STOP:\par Patient Name: Esha Murphy \par YOB: 1985 \par Address: [address] [place] [address] [place] [facility] [place] [address] \par Sex: Female \par Rx Written	Rx Dispensed	Drug	Quantity	Days Supply	Prescriber Name	Prescriber Maritza #	Payment Method	Dispenser\par 12/20/2021	12/22/2021	clonazepam 0.5 mg tablet 	60	30	Andrade Flores MD	[id number]	Insurance	[id number]\par 09/23/2021	12/04/2021	zolpidem tartrate 10 mg tablet 	30	30	Andrade Flores MD	[id number]	Insurance	[id number]\par 11/20/2021	11/20/2021	pregabalin 50 mg capsule 	60	30	Andrade Flores MD	[id number]	Insurance	[id number]\par 09/23/2021	10/26/2021	zolpidem tartrate 10 mg tablet 	30	30	Andrade Flores MD	[id number]	Insurance	[id number]\par 10/25/2021	10/26/2021	clonazepam 0.5 mg tablet 	60	30	Andrade Flores MD	[id number]	Insurance	[id number]\par 10/08/2021	10/14/2021	pregabalin 50 mg capsule 	60	30	Noelle Noriega	[id number]	Insurance	[id number]\par 09/23/2021	09/25/2021	clonazepam 0.5 mg tablet 	60	30	Andrade Flores MD	[id number]	Insurance	[id number]\par 09/23/2021	09/25/2021	zolpidem tartrate 10 mg tablet 	30	30	Andrade Flores MD	[id number]	Insurance	[id number]\par 09/13/2021	09/14/2021	pregabalin 50 mg capsule 	60	30	Noelle Noriega	[id number]	Insurance	[id number]\par 08/14/2021	08/18/2021	clonazepam 0.5 mg tablet 	60	30	Andrade Flores MD	[id number]	Insurance	[id number]\par 06/09/2021	08/18/2021	zolpidem tartrate 10 mg tablet 	30	30	MazAndrade kessler MD	[id number]	Insurance	[id number]\par 06/09/2021	07/18/2021	zolpidem tartrate 10 mg tablet 	30	30	MazAndrade kessler MD	[id number]	Insurance	[id number]\par 07/14/2021	07/18/2021	clonazepam 0.5 mg tablet 	60	30	Andrade Flores MD	[id number]	Insurance	[id number]\par 07/09/2021	07/09/2021	hydrocodone-acetaminophen 5-325 mg tablet 	12	2	Alberto Ballard	[id number]	Insurance	[id number]\par 06/21/2021	06/21/2021	hydrocodone-acetaminophen 5-325 mg tablet 	18	3	Alberto Ballard	[id number]	Insurance	[id number]\par 06/09/2021	06/14/2021	zolpidem tartrate 10 mg tablet 	30	30	Andrade Flores MD	[id number]	Insurance	[id number]\par \par Patient Name: Esah Murphy \par YOB: 1985 \par Address: [address] [place], [place] [id number] \par Sex: Female \par Rx Written	Rx Dispensed	Drug	Quantity	Days Supply	Prescriber Name	Prescriber Maritza #	Payment Method	Dispenser\par 08/18/2021	08/19/2021	pregabalin 25 mg capsule 	60	30	Noelle Noriega	[id number]	Insurance	[id number]\par 06/02/2021	06/04/2021	clonazepam 0.5 mg tablet 	60	30	Kristin Dunham (DO)	[id number]	Insurance	[id number]\par 03/04/2021	05/14/2021	zolpidem tartrate 10 mg tablet 	30	30	Andrade Flores MD	[id number]	Insurance	[id number]\par 03/04/2021	04/09/2021	zolpidem tartrate 10 mg tablet 	30	30	Andrade Flores MD	[id number]	Insurance	[id number]\par 03/27/2021	03/30/2021	clonazepam 0.5 mg tablet 	60	30	Andrade Flores MD	[id number]	Insurance	[id number]\par 03/04/2021	03/11/2021	zolpidem tartrate 10 mg tablet 	30	30	Andrade Flores MD	[id number]	Insurance	[id number]\par 11/30/2020	02/05/2021	zolpidem tartrate 10 mg tablet 	30	30	Andrade Flores MD	[id number]	Insurance	[id number]\par 02/05/2021	02/05/2021	clonazepam 0.5 mg tablet 	60	30	Andrade Flores MD	[id number]	Insurance	[id number]\par 11/30/2020	12/31/2020	zolpidem tartrate 10 mg tablet 	30	30	Andrade Flores MD	[id number]	Insurance	[id number]\par 12/23/2020	12/31/2020	alprazolam 0.5 mg tablet 	90	30	Andrade Flores MD	[id number]	Insurance	[id number]\par \par \par \par PLAN:\par D/C Adderall 5 mg PO QD for ADHD\par Continue Vyvanse 30 mg PO QD for ADHD and binge eating disorder\par Continue Cogentin 0.1 mg PO QHS for sweating. \par Continue Abilify 7 mg PO QHS for depression augmentation\par Continue Trazodone 200 mg PO QHS for insomnia \par Continue Cymbalta 60 mg PO QHS for depression, anxiety, and neuropathic pain\par Continue Klonopin 0.5 mg PO BID PRN for anxiety. none prescribed as she has. \par Continue Wellbutrin  PO QAM for depression, low motivation, energy and concentration\par - Discussed risks and benefits of medications including side effects of GI and sexual side effects with SSRI. Alternative strategies including no intervention discussed with patient. Patient consents to current medications as prescribed.\par - Patient understands to contact clinic prn with concerns and agrees to call 911 or go to nearest ER if symptoms worsen.\par - Next appointment made in 1 month. Patient left the office without any distress.\par

## 2023-08-01 ENCOUNTER — APPOINTMENT (OUTPATIENT)
Dept: PSYCHIATRY | Facility: CLINIC | Age: 38
End: 2023-08-01
Payer: COMMERCIAL

## 2023-08-01 PROCEDURE — 99214 OFFICE O/P EST MOD 30 MIN: CPT | Mod: 95
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
## 2023-08-01 NOTE — DISCUSSION/SUMMARY
[FreeTextEntry1] : Assessment: Patient is a 35 yo  female with h/o depression and anxiety seen today for medication management. . Patient is compliant with medications and tolerating without any new reported side effects. I-stop reviewed and no issues noticed.  I-STOP: Patient Name: Esha Murphy  YOB: 1985  Address: [address] [place] [address] AVE [facility] [place] [address]  Sex: Female  Rx Written	Rx Dispensed	Drug	Quantity	Days Supply	Prescriber Name	Prescriber Maritza #	Payment Method	Dispenser 12/20/2021	12/22/2021	clonazepam 0.5 mg tablet 	60	30	Andrade Flores MD	[id number]	Insurance	[id number] 09/23/2021	12/04/2021	zolpidem tartrate 10 mg tablet 	30	30	Andrade Flores MD	[id number]	Insurance	[id number] 11/20/2021	11/20/2021	pregabalin 50 mg capsule 	60	30	Andrade Flores MD	[id number]	Insurance	[id number] 09/23/2021	10/26/2021	zolpidem tartrate 10 mg tablet 	30	30	Andrade Flores MD	[id number]	Insurance	[id number] 10/25/2021	10/26/2021	clonazepam 0.5 mg tablet 	60	30	Andrade Flores MD	[id number]	Insurance	[id number] 10/08/2021	10/14/2021	pregabalin 50 mg capsule 	60	30	Noelle Noriega	[id number]	Insurance	[id number] 09/23/2021	09/25/2021	clonazepam 0.5 mg tablet 	60	30	Andrade Flores MD	[id number]	Insurance	[id number] 09/23/2021	09/25/2021	zolpidem tartrate 10 mg tablet 	30	30	Andrade Flores MD	[id number]	Insurance	[id number] 09/13/2021	09/14/2021	pregabalin 50 mg capsule 	60	30	Noelle Noriega	[id number]	Insurance	[id number] 08/14/2021	08/18/2021	clonazepam 0.5 mg tablet 	60	30	Andrade Flores MD	[id number]	Insurance	[id number] 06/09/2021	08/18/2021	zolpidem tartrate 10 mg tablet 	30	30	Andrade Flores MD	[id number]	Insurance	[id number] 06/09/2021	07/18/2021	zolpidem tartrate 10 mg tablet 	30	30	Andrade Flores MD	[id number]	Insurance	[id number] 07/14/2021	07/18/2021	clonazepam 0.5 mg tablet 	60	30	Andrade Flores MD	[id number]	Insurance	[id number] 07/09/2021	07/09/2021	hydrocodone-acetaminophen 5-325 mg tablet 	12	2	Alberto Ballard	[id number]	Insurance	[id number] 06/21/2021	06/21/2021	hydrocodone-acetaminophen 5-325 mg tablet 	18	3	Alberto Ballard	[id number]	Insurance	[id number] 06/09/2021	06/14/2021	zolpidem tartrate 10 mg tablet 	30	30	Andrade Flores MD	[id number]	Insurance	[id number]  Patient Name: Esha Murphy  YOB: 1985  Address: [address] [place] [address]  Sex: Female  Rx Written	Rx Dispensed	Drug	Quantity	Days Supply	Prescriber Name	Prescriber Maritza #	Payment Method	Dispenser 08/18/2021	08/19/2021	pregabalin 25 mg capsule 	60	30	Noelle Noriega	[id number]	Insurance	[facility] [id number] 06/02/2021	06/04/2021	clonazepam 0.5 mg tablet 	60	30	Kristin Dunham (DO)	[id number]	Insurance	[id number] 03/04/2021	05/14/2021	zolpidem tartrate 10 mg tablet 	30	30	Andrade Flores MD	[id number]	Insurance	[id number] 03/04/2021	04/09/2021	zolpidem tartrate 10 mg tablet 	30	30	Andrade Flores MD	[id number]	Insurance	[id number] 03/27/2021	03/30/2021	clonazepam 0.5 mg tablet 	60	30	Andrade Flores MD	[id number]	Insurance	[id number] 03/04/2021	03/11/2021	zolpidem tartrate 10 mg tablet 	30	30	Andrade Flores MD	[id number]	Insurance	[id number] 11/30/2020	02/05/2021	zolpidem tartrate 10 mg tablet 	30	30	Andrade Flores MD	[id number]	Insurance	[id number] 02/05/2021	02/05/2021	clonazepam 0.5 mg tablet 	60	30	Andrade Flores MD	[id number]	Insurance	[id number] 11/30/2020	12/31/2020	zolpidem tartrate 10 mg tablet 	30	30	Andrade Flores MD	[id number]	Insurance	[id number] 12/23/2020	12/31/2020	alprazolam 0.5 mg tablet 	90	30	Andrade Flores MD	[id number]	Insurance	[id number]    PLAN: D/C Adderall 5 mg PO QD for ADHD Increase Vyvanse 30 to 40 mg PO QD for ADHD and binge eating disorder Continue Cogentin 0.1 mg PO QHS for sweating.  Continue Abilify 7 mg PO QHS for depression augmentation Continue Trazodone 200 mg PO QHS for insomnia  Continue Cymbalta 60 mg PO QHS for depression, anxiety, and neuropathic pain Continue Klonopin 0.5 mg PO BID PRN for anxiety. none prescribed as she has.  Continue Wellbutrin  PO QAM for depression, low motivation, energy and concentration - Discussed risks and benefits of medications including side effects of GI and sexual side effects with SSRI. Alternative strategies including no intervention discussed with patient. Patient consents to current medications as prescribed. - Patient understands to contact clinic prn with concerns and agrees to call 911 or go to nearest ER if symptoms worsen. - Next appointment made in 1 month. Patient left the office without any distress.

## 2023-08-01 NOTE — HISTORY OF PRESENT ILLNESS
[de-identified] : Patient is here for 1 month followup visit via telehealth  No medication changes last month. States she has been having decrease energy and increasied weight. States she is eating sweets at night. As a result ood is depressed due to weight gain. Used Klonoppin 2 times in mid July. Sleepin-8 hours per night. Groggy. No sweating with the Cogentin. Takes Trazodone 200 mg.  Appetite is increased more at night times especially for sweets. States she is exercising and everything and still not able to lose weight.  Energy, and motivation are all decreased. Concentration is decreased but not down as it used to be. Denies any VH, SI or HI Does drug holiday 1 day per week.  [Home] : at home, [unfilled] , at the time of the visit. [Medical Office: ([facility])___] : at the medical office located in  [Verbal consent obtained from patient] : the patient, [unfilled]

## 2023-08-10 ENCOUNTER — RX RENEWAL (OUTPATIENT)
Age: 38
End: 2023-08-10

## 2023-08-10 ENCOUNTER — APPOINTMENT (OUTPATIENT)
Dept: INTERNAL MEDICINE | Facility: CLINIC | Age: 38
End: 2023-08-10
Payer: COMMERCIAL

## 2023-08-10 DIAGNOSIS — R09.82 POSTNASAL DRIP: ICD-10-CM

## 2023-08-10 PROCEDURE — 99213 OFFICE O/P EST LOW 20 MIN: CPT

## 2023-08-10 RX ORDER — ALBUTEROL SULFATE 90 UG/1
108 (90 BASE) AEROSOL, METERED RESPIRATORY (INHALATION)
Qty: 1 | Refills: 2 | Status: ACTIVE | COMMUNITY
Start: 2023-08-10 | End: 1900-01-01

## 2023-08-10 RX ORDER — FLUTICASONE PROPIONATE 50 UG/1
50 SPRAY, METERED NASAL DAILY
Qty: 48 | Refills: 0 | Status: ACTIVE | COMMUNITY
Start: 2023-08-10 | End: 1900-01-01

## 2023-08-14 ENCOUNTER — RX RENEWAL (OUTPATIENT)
Age: 38
End: 2023-08-14

## 2023-08-16 ENCOUNTER — RX RENEWAL (OUTPATIENT)
Age: 38
End: 2023-08-16

## 2023-08-21 NOTE — HISTORY OF PRESENT ILLNESS
[FreeTextEntry8] : Pt presents for evaluation of acute nasal congestion / postnasal drip. Occasional cough productive of clear mucous. No fever/chills. No relief with Flonase  cough is spasmodic

## 2023-08-21 NOTE — ASSESSMENT
[FreeTextEntry1] : Begin ventolin 2 puffs q 4 PRN mucinex PRN flonase F/U if symptoms do not resolve, or if they should change/worsen.

## 2023-09-01 ENCOUNTER — APPOINTMENT (OUTPATIENT)
Dept: PAIN MANAGEMENT | Facility: CLINIC | Age: 38
End: 2023-09-01
Payer: COMMERCIAL

## 2023-09-01 VITALS
SYSTOLIC BLOOD PRESSURE: 109 MMHG | DIASTOLIC BLOOD PRESSURE: 71 MMHG | BODY MASS INDEX: 37.74 KG/M2 | HEART RATE: 86 BPM | HEIGHT: 63 IN | WEIGHT: 213 LBS

## 2023-09-01 PROCEDURE — 99214 OFFICE O/P EST MOD 30 MIN: CPT

## 2023-09-01 RX ORDER — KETOROLAC TROMETHAMINE 10 MG/1
10 TABLET, FILM COATED ORAL
Qty: 12 | Refills: 0 | Status: ACTIVE | COMMUNITY
Start: 2023-09-01 | End: 1900-01-01

## 2023-09-01 NOTE — ASSESSMENT
[FreeTextEntry1] : toradol bridge   RTO 2 months - consider increase in Diamox dose to 1250mg / day  Weight loss.

## 2023-09-01 NOTE — PHYSICAL EXAM
[General Appearance - Alert] : alert [General Appearance - Well-Appearing] : healthy appearing [General Appearance - In No Acute Distress] : in no acute distress [Oriented To Time, Place, And Person] : oriented to person, place, and time [Impaired Insight] : insight and judgment were intact [Affect] : the affect was normal [Mood] : the mood was normal [Memory Recent] : recent memory was not impaired [Memory Remote] : remote memory was not impaired [Cranial Nerves Facial (VII)] : face symmetrical [Cranial Nerves Accessory (XI - Cranial And Spinal)] : head turning and shoulder shrug symmetric [Cranial Nerves Hypoglossal (XII)] : there was no tongue deviation with protrusion [Motor Strength] : muscle strength was normal in all four extremities [Paresis Pronator Drift Right-Sided] : no pronator drift on the right [Paresis Pronator Drift Left-Sided] : no pronator drift on the left [Motor Strength Upper Extremities Bilaterally] : strength was normal in both upper extremities [Motor Strength Lower Extremities Bilaterally] : strength was normal in both lower extremities [Coordination - Dysmetria Impaired Finger-to-Nose Bilateral] : not present [Sclera] : the sclera and conjunctiva were normal [PERRL With Normal Accommodation] : pupils were equal in size, round, reactive to light, with normal accommodation [Extraocular Movements] : extraocular movements were intact [Optic Disc Abnormality] : the optic disc were normal in size and color [] : no respiratory distress [Abnormal Walk] : normal gait

## 2023-09-01 NOTE — HISTORY OF PRESENT ILLNESS
[Headache] : headache [Dizziness] : dizziness [Nausea] : nausea [Vomiting] : Vomiting [Photophobia] : photophobia [Phonophobia] : phonophobia [Neck Pain] : neck pain [Numbness] : numbness [Tingling] : tingling [Daily] : daily [FreeTextEntry1] : Pt returns today for a follow up appt .  Had a migraine everyday in August . + sensitive to light and sound with nausea .   Taking Ubrelvy or Nurtec to abort migraine. Saw Dr Rice -neuro-ophthalmologist - no papilledema noted - RTO 6 month .  Also sprained her ankle and has gained  weight . Discussed weight gain may contribute to increase in h/a d/t increase in intracranial pressure.  [Scotoma] : no scotoma

## 2023-09-26 ENCOUNTER — APPOINTMENT (OUTPATIENT)
Dept: PSYCHIATRY | Facility: CLINIC | Age: 38
End: 2023-09-26
Payer: COMMERCIAL

## 2023-09-26 ENCOUNTER — APPOINTMENT (OUTPATIENT)
Dept: INTERNAL MEDICINE | Facility: CLINIC | Age: 38
End: 2023-09-26

## 2023-09-26 PROCEDURE — 99214 OFFICE O/P EST MOD 30 MIN: CPT | Mod: 95

## 2023-10-17 ENCOUNTER — APPOINTMENT (OUTPATIENT)
Dept: PAIN MANAGEMENT | Facility: CLINIC | Age: 38
End: 2023-10-17
Payer: COMMERCIAL

## 2023-10-17 VITALS
SYSTOLIC BLOOD PRESSURE: 112 MMHG | WEIGHT: 223 LBS | HEART RATE: 109 BPM | DIASTOLIC BLOOD PRESSURE: 80 MMHG | BODY MASS INDEX: 39.51 KG/M2 | HEIGHT: 63 IN

## 2023-10-17 PROCEDURE — 99213 OFFICE O/P EST LOW 20 MIN: CPT

## 2023-10-17 RX ORDER — RIMEGEPANT SULFATE 75 MG/75MG
75 TABLET, ORALLY DISINTEGRATING ORAL DAILY
Qty: 8 | Refills: 3 | Status: ACTIVE | COMMUNITY
Start: 2023-05-01 | End: 1900-01-01

## 2023-10-18 ENCOUNTER — APPOINTMENT (OUTPATIENT)
Dept: PSYCHIATRY | Facility: CLINIC | Age: 38
End: 2023-10-18

## 2023-10-30 ENCOUNTER — NON-APPOINTMENT (OUTPATIENT)
Age: 38
End: 2023-10-30

## 2023-11-06 ENCOUNTER — NON-APPOINTMENT (OUTPATIENT)
Age: 38
End: 2023-11-06

## 2023-11-07 ENCOUNTER — LABORATORY RESULT (OUTPATIENT)
Age: 38
End: 2023-11-07

## 2023-11-07 ENCOUNTER — APPOINTMENT (OUTPATIENT)
Dept: PSYCHIATRY | Facility: CLINIC | Age: 38
End: 2023-11-07
Payer: COMMERCIAL

## 2023-11-07 ENCOUNTER — NON-APPOINTMENT (OUTPATIENT)
Age: 38
End: 2023-11-07

## 2023-11-07 ENCOUNTER — APPOINTMENT (OUTPATIENT)
Dept: INTERNAL MEDICINE | Facility: CLINIC | Age: 38
End: 2023-11-07
Payer: COMMERCIAL

## 2023-11-07 DIAGNOSIS — R53.83 OTHER FATIGUE: ICD-10-CM

## 2023-11-07 DIAGNOSIS — R41.840 ATTENTION AND CONCENTRATION DEFICIT: ICD-10-CM

## 2023-11-07 PROCEDURE — 93000 ELECTROCARDIOGRAM COMPLETE: CPT | Mod: 59

## 2023-11-07 PROCEDURE — 99214 OFFICE O/P EST MOD 30 MIN: CPT | Mod: 25

## 2023-11-07 PROCEDURE — 36415 COLL VENOUS BLD VENIPUNCTURE: CPT

## 2023-11-07 PROCEDURE — 99214 OFFICE O/P EST MOD 30 MIN: CPT | Mod: 95

## 2023-11-07 RX ORDER — ALCOHOL ANTISEPTIC PADS
70 PADS, MEDICATED (EA) TOPICAL
Qty: 1 | Refills: 0 | Status: ACTIVE | COMMUNITY
Start: 2023-11-07 | End: 1900-01-01

## 2023-11-07 RX ORDER — AZITHROMYCIN 250 MG/1
250 TABLET, FILM COATED ORAL
Qty: 1 | Refills: 0 | Status: DISCONTINUED | COMMUNITY
Start: 2023-05-31 | End: 2023-11-07

## 2023-11-07 RX ORDER — GUAIFENESIN AND CODEINE PHOSPHATE 10; 100 MG/5ML; MG/5ML
100-10 SOLUTION ORAL EVERY 6 HOURS
Qty: 236 | Refills: 0 | Status: DISCONTINUED | COMMUNITY
Start: 2023-05-31 | End: 2023-11-07

## 2023-11-08 ENCOUNTER — NON-APPOINTMENT (OUTPATIENT)
Age: 38
End: 2023-11-08

## 2023-11-08 ENCOUNTER — TRANSCRIPTION ENCOUNTER (OUTPATIENT)
Age: 38
End: 2023-11-08

## 2023-11-08 DIAGNOSIS — E61.1 IRON DEFICIENCY: ICD-10-CM

## 2023-11-08 LAB
25(OH)D3 SERPL-MCNC: 20.4 NG/ML
ALBUMIN SERPL ELPH-MCNC: 4.3 G/DL
ALP BLD-CCNC: 82 U/L
ALT SERPL-CCNC: 13 U/L
ANION GAP SERPL CALC-SCNC: 11 MMOL/L
APPEARANCE: CLEAR
AST SERPL-CCNC: 13 U/L
BACTERIA: ABNORMAL /HPF
BASOPHILS # BLD AUTO: 0.04 K/UL
BASOPHILS NFR BLD AUTO: 0.5 %
BILIRUB SERPL-MCNC: 0.2 MG/DL
BILIRUBIN URINE: NEGATIVE
BLOOD URINE: NEGATIVE
BUN SERPL-MCNC: 13 MG/DL
CALCIUM SERPL-MCNC: 9.1 MG/DL
CAST: 0 /LPF
CHLORIDE SERPL-SCNC: 105 MMOL/L
CHOLEST SERPL-MCNC: 183 MG/DL
CO2 SERPL-SCNC: 21 MMOL/L
COLOR: YELLOW
CORTIS SERPL-MCNC: 6.3 UG/DL
CREAT SERPL-MCNC: 0.76 MG/DL
EGFR: 103 ML/MIN/1.73M2
EOSINOPHIL # BLD AUTO: 0.17 K/UL
EOSINOPHIL NFR BLD AUTO: 2.2 %
EPITHELIAL CELLS: 3 /HPF
ESTIMATED AVERAGE GLUCOSE: 108 MG/DL
FERRITIN SERPL-MCNC: 9 NG/ML
FOLATE SERPL-MCNC: 9.2 NG/ML
GLUCOSE QUALITATIVE U: NEGATIVE MG/DL
GLUCOSE SERPL-MCNC: 93 MG/DL
HBA1C MFR BLD HPLC: 5.4 %
HCT VFR BLD CALC: 36.6 %
HDLC SERPL-MCNC: 49 MG/DL
HGB BLD-MCNC: 11.5 G/DL
HIV1+2 AB SPEC QL IA.RAPID: NONREACTIVE
IMM GRANULOCYTES NFR BLD AUTO: 0.9 %
IRON SATN MFR SERPL: 12 %
IRON SERPL-MCNC: 49 UG/DL
KETONES URINE: NEGATIVE MG/DL
LDLC SERPL CALC-MCNC: 113 MG/DL
LEUKOCYTE ESTERASE URINE: NEGATIVE
LYMPHOCYTES # BLD AUTO: 2.38 K/UL
LYMPHOCYTES NFR BLD AUTO: 30.2 %
MAN DIFF?: NORMAL
MCHC RBC-ENTMCNC: 25.6 PG
MCHC RBC-ENTMCNC: 31.4 GM/DL
MCV RBC AUTO: 81.3 FL
MICROSCOPIC-UA: NORMAL
MONOCYTES # BLD AUTO: 0.66 K/UL
MONOCYTES NFR BLD AUTO: 8.4 %
NEUTROPHILS # BLD AUTO: 4.57 K/UL
NEUTROPHILS NFR BLD AUTO: 57.8 %
NITRITE URINE: NEGATIVE
NONHDLC SERPL-MCNC: 134 MG/DL
PH URINE: 6.5
PLATELET # BLD AUTO: 313 K/UL
POTASSIUM SERPL-SCNC: 4.4 MMOL/L
PROT SERPL-MCNC: 7.2 G/DL
PROTEIN URINE: NEGATIVE MG/DL
RBC # BLD: 4.5 M/UL
RBC # FLD: 13.9 %
RED BLOOD CELLS URINE: 2 /HPF
SODIUM SERPL-SCNC: 137 MMOL/L
SPECIFIC GRAVITY URINE: 1.02
T PALLIDUM AB SER QL IA: NEGATIVE
T4 FREE SERPL-MCNC: 1.3 NG/DL
TIBC SERPL-MCNC: 403 UG/DL
TRIGL SERPL-MCNC: 116 MG/DL
TSH SERPL-ACNC: 2.11 UIU/ML
UIBC SERPL-MCNC: 354 UG/DL
UROBILINOGEN URINE: 0.2 MG/DL
VIT B12 SERPL-MCNC: 438 PG/ML
WBC # FLD AUTO: 7.89 K/UL
WHITE BLOOD CELLS URINE: 0 /HPF

## 2023-11-08 RX ORDER — IRON,CARBONYL/ASCORBIC ACID 65MG-125MG
65-125 TABLET, DELAYED RELEASE (ENTERIC COATED) ORAL
Qty: 120 | Refills: 0 | Status: ACTIVE | COMMUNITY
Start: 2023-11-08

## 2023-11-11 LAB
BACTERIA UR CULT: NORMAL
EBV EA AB SER IA-ACNC: 10.9 U/ML
EBV EA AB TITR SER IF: POSITIVE
EBV EA IGG SER QL IA: 200 U/ML
EBV EA IGG SER-ACNC: ABNORMAL
EBV EA IGM SER IA-ACNC: NEGATIVE
EBV PATRN SPEC IB-IMP: NORMAL
EBV VCA IGG SER IA-ACNC: >750 U/ML
EBV VCA IGM SER QL IA: <10 U/ML
EPSTEIN-BARR VIRUS CAPSID ANTIGEN IGG: POSITIVE

## 2023-11-27 ENCOUNTER — TRANSCRIPTION ENCOUNTER (OUTPATIENT)
Age: 38
End: 2023-11-27

## 2023-11-27 ENCOUNTER — NON-APPOINTMENT (OUTPATIENT)
Age: 38
End: 2023-11-27

## 2023-11-28 ENCOUNTER — NON-APPOINTMENT (OUTPATIENT)
Age: 38
End: 2023-11-28

## 2023-12-06 ENCOUNTER — APPOINTMENT (OUTPATIENT)
Dept: PSYCHIATRY | Facility: CLINIC | Age: 38
End: 2023-12-06
Payer: COMMERCIAL

## 2023-12-06 PROCEDURE — 99214 OFFICE O/P EST MOD 30 MIN: CPT | Mod: 95

## 2023-12-11 ENCOUNTER — TRANSCRIPTION ENCOUNTER (OUTPATIENT)
Age: 38
End: 2023-12-11

## 2024-01-15 ENCOUNTER — NON-APPOINTMENT (OUTPATIENT)
Age: 39
End: 2024-01-15

## 2024-01-18 ENCOUNTER — APPOINTMENT (OUTPATIENT)
Dept: INTERNAL MEDICINE | Facility: CLINIC | Age: 39
End: 2024-01-18
Payer: COMMERCIAL

## 2024-01-18 VITALS — DIASTOLIC BLOOD PRESSURE: 80 MMHG | SYSTOLIC BLOOD PRESSURE: 110 MMHG | HEART RATE: 84 BPM | RESPIRATION RATE: 14 BRPM

## 2024-01-18 DIAGNOSIS — J06.9 ACUTE UPPER RESPIRATORY INFECTION, UNSPECIFIED: ICD-10-CM

## 2024-01-18 PROCEDURE — 99213 OFFICE O/P EST LOW 20 MIN: CPT

## 2024-01-18 RX ORDER — TIRZEPATIDE 2.5 MG/.5ML
2.5 INJECTION, SOLUTION SUBCUTANEOUS
Qty: 1 | Refills: 0 | Status: DISCONTINUED | COMMUNITY
Start: 2023-11-07 | End: 2024-01-18

## 2024-01-18 RX ORDER — ACETAZOLAMIDE 500 MG/1
500 CAPSULE ORAL
Qty: 120 | Refills: 2 | Status: DISCONTINUED | COMMUNITY
Start: 2021-09-03 | End: 2024-01-18

## 2024-01-18 RX ORDER — DULOXETINE HYDROCHLORIDE 20 MG/1
20 CAPSULE, DELAYED RELEASE PELLETS ORAL
Qty: 90 | Refills: 0 | Status: DISCONTINUED | COMMUNITY
Start: 2023-09-26 | End: 2024-01-18

## 2024-01-18 RX ORDER — DULOXETINE HYDROCHLORIDE 40 MG/1
40 CAPSULE, DELAYED RELEASE PELLETS ORAL
Qty: 90 | Refills: 0 | Status: DISCONTINUED | COMMUNITY
Start: 2018-11-26 | End: 2024-01-18

## 2024-01-18 RX ORDER — AMOXICILLIN AND CLAVULANATE POTASSIUM 875; 125 MG/1; MG/1
875-125 TABLET, COATED ORAL
Qty: 20 | Refills: 0 | Status: ACTIVE | COMMUNITY
Start: 2024-01-18 | End: 1900-01-01

## 2024-01-18 RX ORDER — SEMAGLUTIDE 0.25 MG/.5ML
0.25 INJECTION, SOLUTION SUBCUTANEOUS
Qty: 1 | Refills: 0 | Status: DISCONTINUED | COMMUNITY
Start: 2023-11-07 | End: 2024-01-18

## 2024-01-18 RX ORDER — SEMAGLUTIDE 0.5 MG/.5ML
0.5 INJECTION, SOLUTION SUBCUTANEOUS
Qty: 1 | Refills: 0 | Status: DISCONTINUED | COMMUNITY
Start: 2023-11-07 | End: 2024-01-18

## 2024-01-18 NOTE — ASSESSMENT
[FreeTextEntry1] : Augmentin bid for ten days Bromfed PRN Albuterol HFA PRN F/U if symptoms do not resolve, or if they should change/worsen.

## 2024-01-18 NOTE — HISTORY OF PRESENT ILLNESS
[FreeTextEntry8] : Pt presents with 6 days of URI - nasal congestion /cough/ sore throat urgent care tested for COVID/Flu - negative +chills No SOB cough productive of yellow sputum no help with OTC cough /cold meds

## 2024-01-25 ENCOUNTER — APPOINTMENT (OUTPATIENT)
Dept: ULTRASOUND IMAGING | Facility: IMAGING CENTER | Age: 39
End: 2024-01-25

## 2024-01-25 ENCOUNTER — APPOINTMENT (OUTPATIENT)
Dept: MAMMOGRAPHY | Facility: IMAGING CENTER | Age: 39
End: 2024-01-25

## 2024-02-16 ENCOUNTER — RX RENEWAL (OUTPATIENT)
Age: 39
End: 2024-02-16

## 2024-02-16 RX ORDER — ALBUTEROL SULFATE 90 UG/1
108 (90 BASE) INHALANT RESPIRATORY (INHALATION) EVERY 4 HOURS
Qty: 8.5 | Refills: 0 | Status: ACTIVE | COMMUNITY
Start: 2020-12-21 | End: 1900-01-01

## 2024-02-17 ENCOUNTER — APPOINTMENT (OUTPATIENT)
Dept: ORTHOPEDIC SURGERY | Facility: CLINIC | Age: 39
End: 2024-02-17
Payer: COMMERCIAL

## 2024-02-17 VITALS — HEIGHT: 64 IN | BODY MASS INDEX: 39.27 KG/M2 | WEIGHT: 230 LBS

## 2024-02-17 DIAGNOSIS — M54.12 RADICULOPATHY, CERVICAL REGION: ICD-10-CM

## 2024-02-17 DIAGNOSIS — M62.838 OTHER MUSCLE SPASM: ICD-10-CM

## 2024-02-17 PROCEDURE — 73010 X-RAY EXAM OF SHOULDER BLADE: CPT | Mod: RT

## 2024-02-17 PROCEDURE — 73030 X-RAY EXAM OF SHOULDER: CPT | Mod: RT

## 2024-02-17 PROCEDURE — 99204 OFFICE O/P NEW MOD 45 MIN: CPT

## 2024-02-17 PROCEDURE — 72040 X-RAY EXAM NECK SPINE 2-3 VW: CPT

## 2024-02-17 RX ORDER — ESCITALOPRAM OXALATE 5 MG/1
TABLET, FILM COATED ORAL
Refills: 0 | Status: ACTIVE | COMMUNITY

## 2024-02-17 RX ORDER — LISDEXAMFETAMINE DIMESYLATE 10 MG/1
CAPSULE ORAL
Refills: 0 | Status: ACTIVE | COMMUNITY

## 2024-02-17 NOTE — HISTORY OF PRESENT ILLNESS
[Sudden] : sudden [10] : 10 [7] : 7 [Radiating] : radiating [Sharp] : sharp [Frequent] : frequent [Nothing helps with pain getting better] : Nothing helps with pain getting better [de-identified] : 02/17/2024 Ms. JANAE HERRERA, a 38 year old female, presents today for neck and right shoulder. Reports right shoulder pain over the past 1 month, denies specific injury or trauma. Difficulty with reaching out and OH. Reprots neck pain and stiffness. Also reports n/t to the fingers of the right hand.  [] : no [FreeTextEntry5] : Patient is a 38 year old female complaining of bilateral shoulder pain R>L for one month with no injury.   [FreeTextEntry7] : to right hand

## 2024-02-17 NOTE — DISCUSSION/SUMMARY
[de-identified] : 38f with right trapezius spasm and cervical radiculopathy 1) MDP rx 2) start physical therapy 3) cryotherapy, rest and activity modification 4) rtc 4 weeks for re-eval   Entered by Janie Escobar acting as scribe. Dr. Casas- The documentation recorded by the scribe accurately reflects the service I personally performed and the decisions made by me.

## 2024-02-17 NOTE — IMAGING
[Straightening consistent with spasm] : Straightening consistent with spasm [Right] : right shoulder [There are no fractures, subluxations or dislocations. No significant abnormalities are seen] : There are no fractures, subluxations or dislocations. No significant abnormalities are seen

## 2024-02-17 NOTE — PHYSICAL EXAM
[4___] : right triceps 4[unfilled]/5 [5___] : right grasp 5[unfilled]/5 [Right] : right shoulder [] : motor and sensory intact distally [TWNoteComboBox7] : active forward flexion 145 degrees [de-identified] : active abduction 160 degrees

## 2024-02-21 ENCOUNTER — NON-APPOINTMENT (OUTPATIENT)
Age: 39
End: 2024-02-21

## 2024-03-02 ENCOUNTER — NON-APPOINTMENT (OUTPATIENT)
Age: 39
End: 2024-03-02

## 2024-03-06 ENCOUNTER — APPOINTMENT (OUTPATIENT)
Dept: PSYCHIATRY | Facility: CLINIC | Age: 39
End: 2024-03-06
Payer: COMMERCIAL

## 2024-03-06 PROCEDURE — 99214 OFFICE O/P EST MOD 30 MIN: CPT | Mod: 95

## 2024-03-06 RX ORDER — CLONAZEPAM 0.5 MG/1
0.5 TABLET ORAL
Qty: 60 | Refills: 0 | Status: ACTIVE | COMMUNITY
Start: 2020-05-09 | End: 1900-01-01

## 2024-03-06 RX ORDER — ESCITALOPRAM OXALATE 10 MG/1
10 TABLET ORAL
Qty: 90 | Refills: 0 | Status: ACTIVE | COMMUNITY
Start: 2023-09-26 | End: 1900-01-01

## 2024-03-06 RX ORDER — BUPROPION HYDROCHLORIDE 150 MG/1
150 TABLET, FILM COATED, EXTENDED RELEASE ORAL DAILY
Qty: 180 | Refills: 0 | Status: ACTIVE | COMMUNITY
Start: 2022-03-23 | End: 1900-01-01

## 2024-03-06 RX ORDER — ESCITALOPRAM OXALATE 5 MG/1
5 TABLET ORAL DAILY
Qty: 90 | Refills: 0 | Status: ACTIVE | COMMUNITY
Start: 2023-11-07 | End: 1900-01-01

## 2024-03-06 NOTE — DISCUSSION/SUMMARY
[FreeTextEntry1] : Assessment: Patient is a 37 yo  female with h/o depression and anxiety seen today for medication management. . Patient is compliant with medications and tolerating without any new reported side effects. I-stop reviewed and no issues noticed.  I-STOP: Patient Name: Esha Murphy  YOB: 1985  Address: [address] [place] [address] [address]VE [facility] [place] [address]  Sex: Female  Rx Written	Rx Dispensed	Drug	Quantity	Days Supply	Prescriber Name	Prescriber Maritza #	Payment Method	Dispenser 12/20/2021	12/22/2021	clonazepam 0.5 mg tablet 	60	30	Andrade Flores MD	[id number]	Insurance	[id number] 09/23/2021	12/04/2021	zolpidem tartrate 10 mg tablet 	30	30	Andrade Flores MD	[id number]	Insurance	[id number] 11/20/2021	11/20/2021	pregabalin 50 mg capsule 	60	30	Andrade Flores MD	[id number]	Insurance	[id number] 09/23/2021	10/26/2021	zolpidem tartrate 10 mg tablet 	30	30	Andrade Flores MD	[id number]	Insurance	[id number] 10/25/2021	10/26/2021	clonazepam 0.5 mg tablet 	60	30	Andrade Flores MD	[id number]	Insurance	[id number] 10/08/2021	10/14/2021	pregabalin 50 mg capsule 	60	30	Noelle Noriega	[id number]	Insurance	[id number] 09/23/2021	09/25/2021	clonazepam 0.5 mg tablet 	60	30	Andrade Flores MD	[id number]	Insurance	[id number] 09/23/2021	09/25/2021	zolpidem tartrate 10 mg tablet 	30	30	Andrade Flores MD	[id number]	Insurance	[id number] 09/13/2021	09/14/2021	pregabalin 50 mg capsule 	60	30	Noelle Noriega	[id number]	Insurance	[id number] 08/14/2021	08/18/2021	clonazepam 0.5 mg tablet 	60	30	Andrade Flores MD	[id number]	Insurance	[id number] 06/09/2021	08/18/2021	zolpidem tartrate 10 mg tablet 	30	30	Andrade Flores MD	[id number]	Insurance	[id number] 06/09/2021	07/18/2021	zolpidem tartrate 10 mg tablet 	30	30	Andrade Flores MD	[id number]	Insurance	[id number] 07/14/2021	07/18/2021	clonazepam 0.5 mg tablet 	60	30	Andrade Flores MD	[id number]	Insurance	[id number] 07/09/2021	07/09/2021	hydrocodone-acetaminophen 5-325 mg tablet 	12	2	Alberto Ballard	[id number]	Insurance	[id number] 06/21/2021	06/21/2021	hydrocodone-acetaminophen 5-325 mg tablet 	18	3	Alberto Ballard	[id number]	Insurance	[id number] 06/09/2021	06/14/2021	zolpidem tartrate 10 mg tablet 	30	30	Andrade Flores MD	[id number]	Insurance	[id number]  Patient Name: Esha Murphy  YOB: 1985  Address: [address] [place] [address]  Sex: Female  Rx Written	Rx Dispensed	Drug	Quantity	Days Supply	Prescriber Name	Prescriber Maritza #	Payment Method	Dispenser 08/18/2021	08/19/2021	pregabalin 25 mg capsule 	60	30	Noelle Noriega	[id number]	Insurance	[facility] [id number] 06/02/2021	06/04/2021	clonazepam 0.5 mg tablet 	60	30	Kristin Dunham (DO)	[id number]	Insurance	[id number] 03/04/2021	05/14/2021	zolpidem tartrate 10 mg tablet 	30	30	Andrade Flores MD	[id number]	Insurance	[id number] 03/04/2021	04/09/2021	zolpidem tartrate 10 mg tablet 	30	30	Andrade Flores MD	[id number]	Insurance	[id number] 03/27/2021	03/30/2021	clonazepam 0.5 mg tablet 	60	30	Andrade Flores MD	[id number]	Insurance	[id number] 03/04/2021	03/11/2021	zolpidem tartrate 10 mg tablet 	30	30	Andrade Flores MD	[id number]	Insurance	[id number] 11/30/2020	02/05/2021	zolpidem tartrate 10 mg tablet 	30	30	Andrade Flores MD	[id number]	Insurance	[id number] 02/05/2021	02/05/2021	clonazepam 0.5 mg tablet 	60	30	Andrade Flores MD	[id number]	Insurance	[id number] 11/30/2020	12/31/2020	zolpidem tartrate 10 mg tablet 	30	30	Andrade Flores MD	[id number]	Insurance	[id number] 12/23/2020	12/31/2020	alprazolam 0.5 mg tablet 	90	30	Andrade Flores MD	[id number]	Insurance	[id number]    PLAN: Continue Lexapro 15 mg PO QD fro depression and anxiety Continue Vyvanse 50 mg PO QD for ADHD and binge eating disorder Continue Cogentin 0.1 mg PO QHS for sweating.  Continue Abilify 7 mg PO QHS for depression augmentation Continue Trazodone 200 mg PO QHS for insomnia  Continue Klonopin 0.5 mg PO BID PRN for anxiety. none prescribed as she has.  Continue Wellbutrin  PO QAM for depression, low motivation, energy and concentration D/C Cymbalta 60 mg PO QHS for depression, anxiety, and neuropathic pain D/C Adderall 5 mg PO QD for ADHD - Discussed risks and benefits of medications including side effects of GI and sexual side effects with SSRI. Alternative strategies including no intervention discussed with patient. Patient consents to current medications as prescribed. - Patient understands to contact clinic prn with concerns and agrees to call 911 or go to nearest ER if symptoms worsen. - Next appointment made in 3 month. Patient was not in any distress.
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
## 2024-03-06 NOTE — HISTORY OF PRESENT ILLNESS
[de-identified] : The following service was provided using telehealth between writer/provider and patient. The patient was at home. The writer was at clinic. Patient was alone and consented to telehealth format. All treatment plans through and including today's date were reviewed with the patient.  Patient is here for 1 month follow-up visit via telehealth.  No medication changes at that time. States she is doing well on the current medication regimen.    Mood: Less depression and anxiety Sleepin hours per night with the Trazodone 200 mg. No sweating with the Cogentin PRN.   Appetite: much better. Binges less.  Energy, concnetration and motivation are all improved. Denies any AVH, SI or HI Does drug holiday 2 days per week now.   [Home] : at home, [unfilled] , at the time of the visit. [Medical Office: ([facility])___] : at the medical office located in  [Verbal consent obtained from patient] : the patient, [unfilled]

## 2024-03-11 ENCOUNTER — APPOINTMENT (OUTPATIENT)
Dept: PAIN MANAGEMENT | Facility: CLINIC | Age: 39
End: 2024-03-11

## 2024-03-16 ENCOUNTER — NON-APPOINTMENT (OUTPATIENT)
Age: 39
End: 2024-03-16

## 2024-03-16 ENCOUNTER — APPOINTMENT (OUTPATIENT)
Dept: INTERNAL MEDICINE | Facility: CLINIC | Age: 39
End: 2024-03-16
Payer: COMMERCIAL

## 2024-03-16 VITALS
WEIGHT: 220 LBS | BODY MASS INDEX: 37.56 KG/M2 | DIASTOLIC BLOOD PRESSURE: 80 MMHG | HEIGHT: 64 IN | SYSTOLIC BLOOD PRESSURE: 120 MMHG

## 2024-03-16 DIAGNOSIS — Z00.00 ENCOUNTER FOR GENERAL ADULT MEDICAL EXAMINATION W/OUT ABNORMAL FINDINGS: ICD-10-CM

## 2024-03-16 DIAGNOSIS — E66.9 OBESITY, UNSPECIFIED: ICD-10-CM

## 2024-03-16 DIAGNOSIS — G47.00 INSOMNIA, UNSPECIFIED: ICD-10-CM

## 2024-03-16 DIAGNOSIS — K21.9 GASTRO-ESOPHAGEAL REFLUX DISEASE W/OUT ESOPHAGITIS: ICD-10-CM

## 2024-03-16 PROCEDURE — G2211 COMPLEX E/M VISIT ADD ON: CPT | Mod: NC,1L

## 2024-03-16 PROCEDURE — 36415 COLL VENOUS BLD VENIPUNCTURE: CPT

## 2024-03-16 PROCEDURE — 99385 PREV VISIT NEW AGE 18-39: CPT

## 2024-03-16 PROCEDURE — 99214 OFFICE O/P EST MOD 30 MIN: CPT | Mod: 25

## 2024-03-16 PROCEDURE — G0444 DEPRESSION SCREEN ANNUAL: CPT | Mod: 59

## 2024-03-16 PROCEDURE — 93000 ELECTROCARDIOGRAM COMPLETE: CPT | Mod: 59

## 2024-03-16 RX ORDER — ARIPIPRAZOLE 2 MG/1
TABLET ORAL
Refills: 0 | Status: DISCONTINUED | COMMUNITY
End: 2024-03-16

## 2024-03-16 RX ORDER — ARIPIPRAZOLE 5 MG/1
5 TABLET ORAL
Qty: 90 | Refills: 0 | Status: DISCONTINUED | COMMUNITY
Start: 2021-12-29 | End: 2024-03-16

## 2024-03-16 RX ORDER — FAMOTIDINE 40 MG/1
40 TABLET, FILM COATED ORAL
Qty: 90 | Refills: 1 | Status: ACTIVE | COMMUNITY
Start: 2024-03-16 | End: 1900-01-01

## 2024-03-16 RX ORDER — PANTOPRAZOLE 20 MG/1
20 TABLET, DELAYED RELEASE ORAL
Qty: 90 | Refills: 0 | Status: DISCONTINUED | COMMUNITY
Start: 2023-06-08 | End: 2024-03-16

## 2024-03-16 RX ORDER — ARIPIPRAZOLE 2 MG/1
2 TABLET ORAL
Qty: 90 | Refills: 0 | Status: DISCONTINUED | COMMUNITY
Start: 2022-10-31 | End: 2024-03-16

## 2024-03-16 RX ORDER — METHYLPREDNISOLONE 4 MG/1
4 TABLET ORAL
Qty: 1 | Refills: 0 | Status: DISCONTINUED | COMMUNITY
Start: 2024-02-17 | End: 2024-03-16

## 2024-03-16 NOTE — PHYSICAL EXAM
[No Acute Distress] : no acute distress [Well Nourished] : well nourished [PERRL] : pupils equal round and reactive to light [Normal Oropharynx] : the oropharynx was normal [Normal TMs] : both tympanic membranes were normal [No Lymphadenopathy] : no lymphadenopathy [Normal Nasal Mucosa] : the nasal mucosa was normal [No Accessory Muscle Use] : no accessory muscle use [Thyroid Normal, No Nodules] : the thyroid was normal and there were no nodules present [Supple] : supple [Clear to Auscultation] : lungs were clear to auscultation bilaterally [Regular Rhythm] : with a regular rhythm [No Murmur] : no murmur heard [Normal S1, S2] : normal S1 and S2 [Normal Appearance] : normal in appearance [No Edema] : there was no peripheral edema [No Axillary Lymphadenopathy] : no axillary lymphadenopathy [No Nipple Discharge] : no nipple discharge [Non-distended] : non-distended [Non Tender] : non-tender [Soft] : abdomen soft [No HSM] : no HSM [Normal Bowel Sounds] : normal bowel sounds [Normal Supraclavicular Nodes] : no supraclavicular lymphadenopathy [Normal Axillary Nodes] : no axillary lymphadenopathy [Normal Anterior Cervical Nodes] : no anterior cervical lymphadenopathy [Normal Posterior Cervical Nodes] : no posterior cervical lymphadenopathy [Normal Insight/Judgement] : insight and judgment were intact [No CVA Tenderness] : no CVA  tenderness [Normal Affect] : the affect was normal

## 2024-03-21 ENCOUNTER — TRANSCRIPTION ENCOUNTER (OUTPATIENT)
Age: 39
End: 2024-03-21

## 2024-03-21 LAB
25(OH)D3 SERPL-MCNC: 20.6 NG/ML
ALBUMIN SERPL ELPH-MCNC: 4.3 G/DL
ALP BLD-CCNC: 79 U/L
ALT SERPL-CCNC: 16 U/L
ANION GAP SERPL CALC-SCNC: 13 MMOL/L
APPEARANCE: ABNORMAL
AST SERPL-CCNC: 15 U/L
BACTERIA UR CULT: NORMAL
BACTERIA: ABNORMAL /HPF
BASOPHILS # BLD AUTO: 0.04 K/UL
BASOPHILS NFR BLD AUTO: 0.5 %
BILIRUB SERPL-MCNC: 0.2 MG/DL
BILIRUBIN URINE: NEGATIVE
BLOOD URINE: NEGATIVE
BUN SERPL-MCNC: 13 MG/DL
C TRACH RRNA SPEC QL NAA+PROBE: NOT DETECTED
CALCIUM OXALATE CRYSTALS: PRESENT
CALCIUM SERPL-MCNC: 9.3 MG/DL
CAST: 1 /LPF
CHLORIDE SERPL-SCNC: 104 MMOL/L
CHOLEST SERPL-MCNC: 182 MG/DL
CO2 SERPL-SCNC: 21 MMOL/L
COLOR: NORMAL
CREAT SERPL-MCNC: 0.79 MG/DL
EGFR: 98 ML/MIN/1.73M2
EOSINOPHIL # BLD AUTO: 0.17 K/UL
EOSINOPHIL NFR BLD AUTO: 1.9 %
EPITHELIAL CELLS: 8 /HPF
ESTIMATED AVERAGE GLUCOSE: 105 MG/DL
FERRITIN SERPL-MCNC: 14 NG/ML
GLUCOSE QUALITATIVE U: NEGATIVE MG/DL
GLUCOSE SERPL-MCNC: 76 MG/DL
HBA1C MFR BLD HPLC: 5.3 %
HCT VFR BLD CALC: 37 %
HDLC SERPL-MCNC: 48 MG/DL
HGB BLD-MCNC: 12 G/DL
HIV1+2 AB SPEC QL IA.RAPID: NONREACTIVE
IMM GRANULOCYTES NFR BLD AUTO: 0.3 %
IRON SATN MFR SERPL: 10 %
IRON SERPL-MCNC: 40 UG/DL
KETONES URINE: ABNORMAL MG/DL
LDLC SERPL CALC-MCNC: 115 MG/DL
LEUKOCYTE ESTERASE URINE: NEGATIVE
LPL SERPL-CCNC: 39 U/L
LYMPHOCYTES # BLD AUTO: 2.27 K/UL
LYMPHOCYTES NFR BLD AUTO: 25.9 %
MAN DIFF?: NORMAL
MCHC RBC-ENTMCNC: 25.5 PG
MCHC RBC-ENTMCNC: 32.4 GM/DL
MCV RBC AUTO: 78.6 FL
MICROSCOPIC-UA: NORMAL
MONOCYTES # BLD AUTO: 0.55 K/UL
MONOCYTES NFR BLD AUTO: 6.3 %
MUCUS: PRESENT
N GONORRHOEA RRNA SPEC QL NAA+PROBE: NOT DETECTED
NEUTROPHILS # BLD AUTO: 5.72 K/UL
NEUTROPHILS NFR BLD AUTO: 65.1 %
NITRITE URINE: NEGATIVE
NONHDLC SERPL-MCNC: 133 MG/DL
PH URINE: 6
PLATELET # BLD AUTO: 322 K/UL
POTASSIUM SERPL-SCNC: 3.8 MMOL/L
PROT SERPL-MCNC: 7.2 G/DL
PROTEIN URINE: NORMAL MG/DL
RBC # BLD: 4.71 M/UL
RBC # FLD: 15.5 %
RED BLOOD CELLS URINE: 3 /HPF
REVIEW: NORMAL
SODIUM SERPL-SCNC: 138 MMOL/L
SOURCE AMPLIFICATION: NORMAL
SPECIFIC GRAVITY URINE: >1.03
T PALLIDUM AB SER QL IA: NEGATIVE
T4 FREE SERPL-MCNC: 1.2 NG/DL
TIBC SERPL-MCNC: 385 UG/DL
TRIGL SERPL-MCNC: 101 MG/DL
TSH SERPL-ACNC: 1.71 UIU/ML
UIBC SERPL-MCNC: 345 UG/DL
UROBILINOGEN URINE: 1 MG/DL
WBC # FLD AUTO: 8.78 K/UL
WHITE BLOOD CELLS URINE: 0 /HPF
YEAST-LIKE CELLS: PRESENT

## 2024-03-21 RX ORDER — CHLORHEXIDINE GLUCONATE 4 %
325 (65 FE) LIQUID (ML) TOPICAL
Qty: 60 | Refills: 0 | Status: ACTIVE | COMMUNITY
Start: 2024-03-21

## 2024-03-26 ENCOUNTER — APPOINTMENT (OUTPATIENT)
Dept: ORTHOPEDIC SURGERY | Facility: CLINIC | Age: 39
End: 2024-03-26

## 2024-03-27 ENCOUNTER — APPOINTMENT (OUTPATIENT)
Dept: ORTHOPEDIC SURGERY | Facility: CLINIC | Age: 39
End: 2024-03-27

## 2024-03-29 PROBLEM — K21.9 GERD WITHOUT ESOPHAGITIS: Status: ACTIVE | Noted: 2020-01-10

## 2024-03-29 NOTE — HISTORY OF PRESENT ILLNESS
[FreeTextEntry1] :  CPE  [de-identified] : Diet: good Exercise: good  in interval - saw orthopedist for R. trapezius spasm and cervical radiculopathy - didn't have time to PT    obesity - started wegovy   GERD - takes protonix 40mg every other day - noticed few days worsneing reflux after eating fatty foods - so watching her die t- trying not ot eat late meals - doesn't notice a relationship to the wegovy   obesity - has lost weight with wegovy - ready to increase to next dose has 1.5 meals per day - small breakfast and small late lunch - skips dinner - stays hydrated less physically active because dog recently

## 2024-03-29 NOTE — ASSESSMENT
[FreeTextEntry1] : 38F c migraine headache, depression, ADHD, obesity, tear of R. ankle, covid-19 infection (1/23) here for cpe    ghm - check fasting bw  physical ecg performed - ekg nsr  advised GI f/u - due for repeat colonoscopy with Dr. Eddie Martinez - pt is overdue  advised covid booster - pt declined  utd with gyn exam and PAP smear 11/23  utd with psychiatrist visit   utd with optho exam  due for dental exam  advised to see derm for check of nevus on R. posterior buttock & FBSE    rtc in 1year for cpe or prn   .

## 2024-03-29 NOTE — REVIEW OF SYSTEMS
[Negative] : Genitourinary [Constipation] : no constipation [Diarrhea] : diarrhea [Melena] : no melena [FreeTextEntry7] : tends to get gassy and belching and minimal Left sided discomfort [FreeTextEntry9] : see HPI

## 2024-04-10 ENCOUNTER — APPOINTMENT (OUTPATIENT)
Dept: INTERNAL MEDICINE | Facility: CLINIC | Age: 39
End: 2024-04-10
Payer: COMMERCIAL

## 2024-04-10 VITALS
SYSTOLIC BLOOD PRESSURE: 120 MMHG | HEIGHT: 64 IN | BODY MASS INDEX: 36.54 KG/M2 | DIASTOLIC BLOOD PRESSURE: 80 MMHG | WEIGHT: 214 LBS | OXYGEN SATURATION: 98 %

## 2024-04-10 DIAGNOSIS — R10.32 LEFT LOWER QUADRANT PAIN: ICD-10-CM

## 2024-04-10 PROCEDURE — 99214 OFFICE O/P EST MOD 30 MIN: CPT

## 2024-04-10 PROCEDURE — 36415 COLL VENOUS BLD VENIPUNCTURE: CPT

## 2024-04-10 PROCEDURE — G2211 COMPLEX E/M VISIT ADD ON: CPT

## 2024-04-12 ENCOUNTER — TRANSCRIPTION ENCOUNTER (OUTPATIENT)
Age: 39
End: 2024-04-12

## 2024-04-12 PROBLEM — R10.32 COLICKY LLQ ABDOMINAL PAIN: Status: ACTIVE | Noted: 2024-04-10

## 2024-04-12 LAB
ALBUMIN SERPL ELPH-MCNC: 4.2 G/DL
ALP BLD-CCNC: 84 U/L
ALT SERPL-CCNC: 16 U/L
ANION GAP SERPL CALC-SCNC: 13 MMOL/L
APPEARANCE: ABNORMAL
AST SERPL-CCNC: 16 U/L
BACTERIA UR CULT: NORMAL
BACTERIA: ABNORMAL /HPF
BASOPHILS # BLD AUTO: 0.04 K/UL
BASOPHILS NFR BLD AUTO: 0.5 %
BILIRUB SERPL-MCNC: <0.2 MG/DL
BILIRUBIN URINE: NEGATIVE
BLOOD URINE: NEGATIVE
BUN SERPL-MCNC: 8 MG/DL
CALCIUM SERPL-MCNC: 9.3 MG/DL
CAST: 3 /LPF
CHLORIDE SERPL-SCNC: 103 MMOL/L
CO2 SERPL-SCNC: 21 MMOL/L
COLOR: YELLOW
CREAT SERPL-MCNC: 0.8 MG/DL
EGFR: 97 ML/MIN/1.73M2
EOSINOPHIL # BLD AUTO: 0.18 K/UL
EOSINOPHIL NFR BLD AUTO: 2.1 %
EPITHELIAL CELLS: 16 /HPF
GLUCOSE QUALITATIVE U: NEGATIVE MG/DL
GLUCOSE SERPL-MCNC: 83 MG/DL
HCG SERPL QL: NEGATIVE
HCT VFR BLD CALC: 37.3 %
HGB BLD-MCNC: 12.2 G/DL
HYALINE CASTS: PRESENT
IMM GRANULOCYTES NFR BLD AUTO: 0.5 %
KETONES URINE: NEGATIVE MG/DL
LEUKOCYTE ESTERASE URINE: NEGATIVE
LPL SERPL-CCNC: 49 U/L
LYMPHOCYTES # BLD AUTO: 2.47 K/UL
LYMPHOCYTES NFR BLD AUTO: 29.1 %
MAN DIFF?: NORMAL
MCHC RBC-ENTMCNC: 25.8 PG
MCHC RBC-ENTMCNC: 32.7 GM/DL
MCV RBC AUTO: 78.9 FL
MICROSCOPIC-UA: NORMAL
MONOCYTES # BLD AUTO: 0.62 K/UL
MONOCYTES NFR BLD AUTO: 7.3 %
MUCUS: PRESENT
NEUTROPHILS # BLD AUTO: 5.13 K/UL
NEUTROPHILS NFR BLD AUTO: 60.5 %
NITRITE URINE: NEGATIVE
PAPP-A SERPL-ACNC: <1 MIU/ML
PH URINE: 6.5
PLATELET # BLD AUTO: 311 K/UL
POTASSIUM SERPL-SCNC: 4 MMOL/L
PROT SERPL-MCNC: 7.2 G/DL
PROTEIN URINE: 30 MG/DL
RBC # BLD: 4.73 M/UL
RBC # FLD: 14.9 %
RED BLOOD CELLS URINE: 2 /HPF
REVIEW: NORMAL
SODIUM SERPL-SCNC: 136 MMOL/L
SPECIFIC GRAVITY URINE: >1.03
UROBILINOGEN URINE: 0.2 MG/DL
WBC # FLD AUTO: 8.48 K/UL
WHITE BLOOD CELLS URINE: 2 /HPF

## 2024-04-12 NOTE — HISTORY OF PRESENT ILLNESS
[FreeTextEntry8] : had 3-4 days of L. LQ pain that resolved - thinks related to stress because took clonazepam yesterday and pain resolved - coincided with stress of losing dog and also ending long-term relationsihp ~1-2 weeks ago - took clonazepam yesterday and pain resolved also took weogvy which did not cause the LL pain to recurr - history of partial L. colectomy due to repeated acute diverticuilitus does not feel like diverticulitis - feels dull   had recurrent pain while ddirivng over here   will f/u to check BW but needs to see GI - could be stress related.

## 2024-04-12 NOTE — PHYSICAL EXAM
[No Acute Distress] : no acute distress [Well Nourished] : well nourished [No Accessory Muscle Use] : no accessory muscle use [Clear to Auscultation] : lungs were clear to auscultation bilaterally [Regular Rhythm] : with a regular rhythm [Normal S1, S2] : normal S1 and S2 [No Murmur] : no murmur heard [No Edema] : there was no peripheral edema [Soft] : abdomen soft [Non-distended] : non-distended [No Masses] : no abdominal mass palpated [No HSM] : no HSM [Normal Bowel Sounds] : normal bowel sounds [Normal Affect] : the affect was normal [Normal Insight/Judgement] : insight and judgment were intact [de-identified] : minimal TTP of LLQ, no rebound, no guarding

## 2024-04-12 NOTE — ASSESSMENT
[FreeTextEntry1] : to take valium daily x 5-7 days - rarely takes valium  to hold next wegovy dose  to f/u with GI   checked BW jina nunes

## 2024-04-15 ENCOUNTER — TRANSCRIPTION ENCOUNTER (OUTPATIENT)
Age: 39
End: 2024-04-15

## 2024-04-22 ENCOUNTER — APPOINTMENT (OUTPATIENT)
Dept: ORTHOPEDIC SURGERY | Facility: CLINIC | Age: 39
End: 2024-04-22
Payer: COMMERCIAL

## 2024-04-22 VITALS — HEIGHT: 64 IN | WEIGHT: 217 LBS | BODY MASS INDEX: 37.05 KG/M2

## 2024-04-22 PROCEDURE — 72040 X-RAY EXAM NECK SPINE 2-3 VW: CPT

## 2024-04-22 PROCEDURE — 99213 OFFICE O/P EST LOW 20 MIN: CPT

## 2024-04-22 PROCEDURE — 72100 X-RAY EXAM L-S SPINE 2/3 VWS: CPT

## 2024-04-22 PROCEDURE — 99203 OFFICE O/P NEW LOW 30 MIN: CPT

## 2024-04-22 RX ORDER — MELOXICAM 15 MG/1
15 TABLET ORAL DAILY
Qty: 30 | Refills: 1 | Status: ACTIVE | COMMUNITY
Start: 2024-04-22 | End: 1900-01-01

## 2024-04-22 RX ORDER — METHOCARBAMOL 750 MG/1
750 TABLET, FILM COATED ORAL
Qty: 30 | Refills: 0 | Status: ACTIVE | COMMUNITY
Start: 2024-04-22 | End: 1900-01-01

## 2024-04-22 NOTE — HISTORY OF PRESENT ILLNESS
[de-identified] : 04/22/2024: Patient presents to the office for evaluation of mid and lower back pain. Patient reports spontaneous onset of mid and lower back pain several days ago. No recent trauma or injury. Symptoms are primarily in the back and increased with prolonged positioning, such as sitting, standing and transitions. There is some mild numbness and tingling into the anterior thighs. No changes in bowel or bladder function. Cervical spine pain is secondary to lower back. Denies numbness, tingling weakness, or dexterity changes in the upper extremities. Patient does recall a history of similar lower back pain approximately five years ago. Reports resolved with interventional pain management and spinal steroid injections. She does recall having imaging of the spine several years ago.   The patient is a 38 year old female who presents today complaining of neck, mid and low back pain Date of Injury/Onset:  1 week ago, 4/15/24 Pain:    At Rest:  10/10 With Activity:    10+/10 Mechanism of injury:   onset Quality of symptoms:  nerve pain with a pulling sensation with tingling sensation down arms and legs Improves with:  ice, heart, lido patch, NSAID's Worse with:  sitting, laying, standing, walking, Prior treatment:  no Prior Imaging: no Additional Information: None

## 2024-04-22 NOTE — DISCUSSION/SUMMARY
[de-identified] : 38 Y F. Patient appears to be clinically, symptomatic from acute lumbar strain. Recommend outpatient physical therapy, oral anti-inflammatory and muscle relaxer as initial treatment modalities. Patient was amenable to treatment plan. A work note will be provided. Possible spinal steroid injections, depending on response to outpatient physical therapy and above modalities, recommend reevaluation in two weeks. Will consider advanced imaging of lumbar spine.   F/U in 1 month.   Prior to appointment and during encounter with patient extensive medical records were reviewed including but not limited to, hospital records, out patient records, imaging results, and lab data. During this appointment the patient was examined, diagnoses were discussed and explained in a face to face manner. In addition extensive time was spent reviewing aforementioned diagnostic studies. Counseling including abnormal image results, differential diagnoses, treatment options, risk and benefits, lifestyle changes, current condition, and current medications was performed. Patient's comments, questions, and concerns were address and patient verbalized understanding. Based on this patient's presentation at our office, which is an orthopedic spine surgeon's office, this patient inherently / intrinsically has a risk, however minute, of developing issues such as Cauda equina syndrome, bowel and bladder changes, or progression of motor or neurological deficits such as paralysis which may be permanent.

## 2024-04-22 NOTE — DATA REVIEWED
[Report was reviewed and noted in the chart] : The report was reviewed and noted in the chart [I independently reviewed and interpreted images and report] : I independently reviewed and interpreted images and report [FreeTextEntry1] : MRI [facility] 2020 Thoracic spine Stable T3/5 small syrinx  Trace disc bulging T3/4  unchanged comparison to 2017 imaging  MRI [facility] Lumbar spine 2020 Normal  Cervical spine x-ray in office today Reversal, normal cervical lordosis C4/5  Lumbar x-ray in office today  Grade 1spondylolisthesis L5/S1  Lumbar scoliosis Possibly secondary spasm No significant single level disk space collapse

## 2024-04-22 NOTE — PHYSICAL EXAM
[de-identified] : Constitutional: - General Appearance: Unremarkable Body Habitus Well Developed Well Nourished Body Habitus No Deformities Well Groomed Ability To communicate: Normal Neurologic: Global sensation is intact to upper and lower extremities. See examination of Neck and/or Spine for exceptions. Orientation to Time, Place and Person is: Normal Mood And Affect is Normal Skin: - Head/Face, Right Upper/Lower Extremity, Left Upper/Lower Extremity: Normal See Examination of Neck and/or Spine for exceptions Cardiovascular: Peripheral Cardiovascular System is Normal Palpation of Lymph Nodes: Normal Palpation of lymph nodes in: Axilla, Cervical, Inguinal Abnormal Palpation of lymph nodes in: None  [FreeTextEntry3] : Ambulatory without assistance. Stiff back. Painful lumbar range of motion. Flexion 60 Extension 15 with Pain.  Lateral rotation lateral bending painful.  Normal strength sensation, bilateral lower extremities.  Negative straight leg raise.  Reflexes equal symmetrical.

## 2024-04-23 ENCOUNTER — APPOINTMENT (OUTPATIENT)
Dept: PAIN MANAGEMENT | Facility: CLINIC | Age: 39
End: 2024-04-23
Payer: COMMERCIAL

## 2024-04-23 VITALS
BODY MASS INDEX: 36.54 KG/M2 | HEIGHT: 64 IN | DIASTOLIC BLOOD PRESSURE: 79 MMHG | HEART RATE: 91 BPM | SYSTOLIC BLOOD PRESSURE: 115 MMHG | WEIGHT: 214 LBS

## 2024-04-23 PROCEDURE — G2211 COMPLEX E/M VISIT ADD ON: CPT

## 2024-04-23 PROCEDURE — 99213 OFFICE O/P EST LOW 20 MIN: CPT

## 2024-04-23 RX ORDER — BROMPHENIRAMINE MALEATE, PSEUDOEPHEDRINE HYDROCHLORIDE AND DEXTROMETHORPHAN HYDROBROMIDE 2; 10; 30 MG/5ML; MG/5ML; MG/5ML
2-30-10 SYRUP ORAL EVERY 4 HOURS
Qty: 1 | Refills: 0 | Status: DISCONTINUED | COMMUNITY
Start: 2024-01-18 | End: 2024-04-23

## 2024-04-23 RX ORDER — ACETAZOLAMIDE 250 MG/1
250 TABLET ORAL
Qty: 120 | Refills: 1 | Status: ACTIVE | COMMUNITY
Start: 2024-04-23 | End: 1900-01-01

## 2024-04-23 NOTE — HISTORY OF PRESENT ILLNESS
[FreeTextEntry1] : Pt returns today for a follow up appt. Has migraine and IIH. Explains ~ 2 months ago she decided to stop all medications. She been working with Psychiatry to re - start her mental health medications.  We discussed re - starting Diamox as it had been helpful in the past. Pt has been experiencing head  pressure .  Discussed need for neuro -ophthalmology exam . Pt is in agreement and will schedule.   [Chronic Headache] : chronic headache [Tingling] : tingling [> 4 hours] : > 4 hours [Worsened] : The patient reports ~his/her~ symptoms since the last visit have worsened

## 2024-04-23 NOTE — ASSESSMENT
[FreeTextEntry1] : Migraine  IIh  re- start Diamox - reminded pt to eat foods rich in KcL Neuro - ophthalmology appt   RTO 4-6 weeks

## 2024-04-26 RX ORDER — METHYLPREDNISOLONE 4 MG/1
4 TABLET ORAL
Qty: 1 | Refills: 0 | Status: ACTIVE | COMMUNITY
Start: 2024-04-26 | End: 1900-01-01

## 2024-04-26 RX ORDER — SEMAGLUTIDE 0.5 MG/.5ML
0.5 INJECTION, SOLUTION SUBCUTANEOUS
Qty: 1 | Refills: 0 | Status: DISCONTINUED | COMMUNITY
Start: 2024-01-30 | End: 2024-04-26

## 2024-05-08 RX ORDER — EPINEPHRINE 0.3 MG/.3ML
0.3 INJECTION INTRAMUSCULAR
Qty: 1 | Refills: 1 | Status: ACTIVE | COMMUNITY
Start: 2024-05-08 | End: 1900-01-01

## 2024-05-08 RX ORDER — SCOPOLAMINE 1.5 MG/1
1 PATCH, EXTENDED RELEASE TRANSDERMAL
Qty: 1 | Refills: 1 | Status: ACTIVE | COMMUNITY
Start: 2024-05-08 | End: 1900-01-01

## 2024-05-10 ENCOUNTER — APPOINTMENT (OUTPATIENT)
Dept: ORTHOPEDIC SURGERY | Facility: CLINIC | Age: 39
End: 2024-05-10
Payer: COMMERCIAL

## 2024-05-10 VITALS — HEIGHT: 64 IN | BODY MASS INDEX: 36.54 KG/M2 | WEIGHT: 214 LBS

## 2024-05-10 DIAGNOSIS — S33.5XXA SPRAIN OF LIGAMENTS OF LUMBAR SPINE, INITIAL ENCOUNTER: ICD-10-CM

## 2024-05-10 DIAGNOSIS — M54.50 LOW BACK PAIN, UNSPECIFIED: ICD-10-CM

## 2024-05-10 PROCEDURE — 99213 OFFICE O/P EST LOW 20 MIN: CPT

## 2024-05-10 RX ORDER — FAMOTIDINE 40 MG/1
40 TABLET, FILM COATED ORAL
Qty: 90 | Refills: 1 | Status: ACTIVE | COMMUNITY
Start: 2024-05-10

## 2024-05-10 RX ORDER — PANTOPRAZOLE 40 MG/1
40 TABLET, DELAYED RELEASE ORAL DAILY
Qty: 90 | Refills: 1 | Status: DISCONTINUED | COMMUNITY
Start: 2022-10-10 | End: 2024-05-10

## 2024-05-10 RX ORDER — METHYLPREDNISOLONE 4 MG/1
4 TABLET ORAL
Qty: 1 | Refills: 0 | Status: ACTIVE | COMMUNITY
Start: 2024-05-10 | End: 1900-01-01

## 2024-05-13 RX ORDER — LISDEXAMFETAMINE 50 MG/1
50 CAPSULE ORAL
Qty: 30 | Refills: 0 | Status: ACTIVE | COMMUNITY
Start: 2023-03-16 | End: 1900-01-01

## 2024-05-16 ENCOUNTER — APPOINTMENT (OUTPATIENT)
Dept: MRI IMAGING | Facility: CLINIC | Age: 39
End: 2024-05-16
Payer: COMMERCIAL

## 2024-05-16 PROCEDURE — 72148 MRI LUMBAR SPINE W/O DYE: CPT

## 2024-05-18 NOTE — PHYSICAL EXAM
[Flexion] : flexion [de-identified] : Constitutional: - General Appearance: Unremarkable Body Habitus Well Developed Well Nourished Body Habitus No Deformities Well Groomed Ability To communicate: Normal Neurologic: Global sensation is intact to upper and lower extremities. See examination of Neck and/or Spine for exceptions. Orientation to Time, Place and Person is: Normal Mood And Affect is Normal Skin: - Head/Face, Right Upper/Lower Extremity, Left Upper/Lower Extremity: Normal See Examination of Neck and/or Spine for exceptions Cardiovascular: Peripheral Cardiovascular System is Normal Palpation of Lymph Nodes: Normal Palpation of lymph nodes in: Axilla, Cervical, Inguinal Abnormal Palpation of lymph nodes in: None  [] : non-antalgic [de-identified] : paresthesia's L3 on LT.  [TWNoteComboBox7] : forward flexion 75 degrees

## 2024-05-18 NOTE — DISCUSSION/SUMMARY
[de-identified] : 39 Y F persistent LLE radiculopathy, sharp, shooting back pains not entirely improved with physical therapy, I am requesting a lumbar MRI to eval for HNP.  Return back to work full duties w/o restrictions starting on May 13th, 2024.   Patient was educated and informed on their condition along with the expected outcomes.  MDP rx'd for symptom control.   F/U in 3 WKS to review images.   Prior to appointment and during encounter with patient extensive medical records were reviewed including but not limited to, hospital records, out patient records, imaging results, and lab data. During this appointment the patient was examined, diagnoses were discussed and explained in a face to face manner. In addition extensive time was spent reviewing aforementioned diagnostic studies. Counseling including abnormal image results, differential diagnoses, treatment options, risk and benefits, lifestyle changes, current condition, and current medications was performed. Patient's comments, questions, and concerns were address and patient verbalized understanding. Based on this patient's presentation at our office, which is an orthopedic spine surgeon's office, this patient inherently / intrinsically has a risk, however minute, of developing issues such as Cauda equina syndrome, bowel and bladder changes, or progression of motor or neurological deficits such as paralysis which may be permanent.   I, Tita Cisse, attest that this documentation has been prepared under the direction and in the presence of provider Andrade Larsen MD.

## 2024-05-18 NOTE — HISTORY OF PRESENT ILLNESS
[de-identified] : 05/10/2024: Patient presenting today for a FUV.  Pt reports 70% improvement in symptoms. She reports numbness/tingling to LT thigh. C/o stabbing pains in lumbar spine. Symptoms improved with physical therapy but still symptomatic. Pain level is 5-6/10  Pt has not been working at this time.   04/22/2024: Patient presents to the office for evaluation of mid and lower back pain. Patient reports spontaneous onset of mid and lower back pain several days ago. No recent trauma or injury. Symptoms are primarily in the back and increased with prolonged positioning, such as sitting, standing and transitions. There is some mild numbness and tingling into the anterior thighs. No changes in bowel or bladder function. Cervical spine pain is secondary to lower back. Denies numbness, tingling weakness, or dexterity changes in the upper extremities. Patient does recall a history of similar lower back pain approximately five years ago. Reports resolved with interventional pain management and spinal steroid injections. She does recall having imaging of the spine several years ago.   The patient is a 38 year old female who presents today complaining of neck, mid and low back pain Date of Injury/Onset:  1 week ago, 4/15/24 Pain:    At Rest:  10/10 With Activity:    10+/10 Mechanism of injury:   onset Quality of symptoms:  nerve pain with a pulling sensation with tingling sensation down arms and legs Improves with:  ice, heart, lido patch, NSAID's Worse with:  sitting, laying, standing, walking, Prior treatment:  no Prior Imaging: no Additional Information: None

## 2024-05-18 NOTE — DATA REVIEWED
[Report was reviewed and noted in the chart] : The report was reviewed and noted in the chart [I independently reviewed and interpreted images and report] : I independently reviewed and interpreted images and report [FreeTextEntry1] : MRI [facility] 2020 Thoracic spine Stable T3/5 small syrinx  Trace disc bulging T3/4  unchanged comparison to 2017 imaging  MRI [facility] Lumbar spine 2020 Normal  Cervical spine x-ray in office today Reversal, normal cervical lordosis C4/5  Lumbar x-ray in office today  Grade 1spondylolisthesis L5/S1  Lumbar scoliosis Possibly secondary spasm No significant single level disk space collapse

## 2024-06-03 DIAGNOSIS — K59.09 OTHER CONSTIPATION: ICD-10-CM

## 2024-06-03 RX ORDER — LINACLOTIDE 72 UG/1
72 CAPSULE, GELATIN COATED ORAL
Refills: 0 | Status: ACTIVE | COMMUNITY
Start: 2024-06-03

## 2024-06-04 ENCOUNTER — NON-APPOINTMENT (OUTPATIENT)
Age: 39
End: 2024-06-04

## 2024-06-05 ENCOUNTER — APPOINTMENT (OUTPATIENT)
Dept: PSYCHIATRY | Facility: CLINIC | Age: 39
End: 2024-06-05
Payer: COMMERCIAL

## 2024-06-05 PROCEDURE — 99214 OFFICE O/P EST MOD 30 MIN: CPT | Mod: 95

## 2024-06-05 RX ORDER — BENZTROPINE MESYLATE 0.5 MG/1
0.5 TABLET ORAL
Qty: 90 | Refills: 0 | Status: ACTIVE | COMMUNITY
Start: 2023-03-16 | End: 1900-01-01

## 2024-06-05 RX ORDER — LISDEXAMFETAMINE DIMESYLATE 50 MG/1
50 CAPSULE ORAL
Qty: 30 | Refills: 0 | Status: ACTIVE | COMMUNITY
Start: 2024-04-11 | End: 1900-01-01

## 2024-06-05 NOTE — DISCUSSION/SUMMARY
[FreeTextEntry1] : Assessment: Patient is a 35 yo  female with h/o depression and anxiety seen today for medication management. . Patient is compliant with medications and tolerating without any new reported side effects. I-stop reviewed and no issues noticed.  I-STOP: Patient Name: sEha Murphy  YOB: 1985  Address: [address] [place] [address] AVE [facility] [place] [address]  Sex: Female  Rx Written	Rx Dispensed	Drug	Quantity	Days Supply	Prescriber Name	Prescriber Maritza #	Payment Method	Dispenser 12/20/2021	12/22/2021	clonazepam 0.5 mg tablet 	60	30	Andrade Flores MD	[id number]	Insurance	[id number] 09/23/2021	12/04/2021	zolpidem tartrate 10 mg tablet 	30	30	Andrade Flores MD	[id number]	Insurance	[id number] 11/20/2021	11/20/2021	pregabalin 50 mg capsule 	60	30	Andrade Flores MD	[id number]	Insurance	[id number] 09/23/2021	10/26/2021	zolpidem tartrate 10 mg tablet 	30	30	Andrade Flores MD	[id number]	Insurance	[id number] 10/25/2021	10/26/2021	clonazepam 0.5 mg tablet 	60	30	Andrade Flores MD	[id number]	Insurance	[id number] 10/08/2021	10/14/2021	pregabalin 50 mg capsule 	60	30	Noelle Noriega	[id number]	Insurance	[id number] 09/23/2021	09/25/2021	clonazepam 0.5 mg tablet 	60	30	Andrade Flores MD	[id number]	Insurance	[id number] 09/23/2021	09/25/2021	zolpidem tartrate 10 mg tablet 	30	30	Andrade Flores MD	[id number]	Insurance	[id number] 09/13/2021	09/14/2021	pregabalin 50 mg capsule 	60	30	Noelle Norigea	[id number]	Insurance	[id number] 08/14/2021	08/18/2021	clonazepam 0.5 mg tablet 	60	30	Andrade Flores MD	[id number]	Insurance	[id number] 06/09/2021	08/18/2021	zolpidem tartrate 10 mg tablet 	30	30	Andrade Flores MD	[id number]	Insurance	[id number] 06/09/2021	07/18/2021	zolpidem tartrate 10 mg tablet 	30	30	Andrade Flores MD	[id number]	Insurance	[id number] 07/14/2021	07/18/2021	clonazepam 0.5 mg tablet 	60	30	Andrade Flores MD	[id number]	Insurance	[id number] 07/09/2021	07/09/2021	hydrocodone-acetaminophen 5-325 mg tablet 	12	2	Alberto Ballard	[id number]	Insurance	[id number] 06/21/2021	06/21/2021	hydrocodone-acetaminophen 5-325 mg tablet 	18	3	Alberto Ballard	[id number]	Insurance	[id number] 06/09/2021	06/14/2021	zolpidem tartrate 10 mg tablet 	30	30	Andrade Flores MD	[id number]	Insurance	[id number]  Patient Name: Esha Murphy  YOB: 1985  Address: [address] [place] [address]  Sex: Female  Rx Written	Rx Dispensed	Drug	Quantity	Days Supply	Prescriber Name	Prescriber Maritza #	Payment Method	Dispenser 08/18/2021	08/19/2021	pregabalin 25 mg capsule 	60	30	Noelle Noriega	[id number]	Insurance	[facility] [id number] 06/02/2021	06/04/2021	clonazepam 0.5 mg tablet 	60	30	Kristin Dunham (DO)	[id number]	Insurance	[id number] 03/04/2021	05/14/2021	zolpidem tartrate 10 mg tablet 	30	30	Andrade Flores MD	[id number]	Insurance	[id number] 03/04/2021	04/09/2021	zolpidem tartrate 10 mg tablet 	30	30	Andrade Flores MD	[id number]	Insurance	[id number] 03/27/2021	03/30/2021	clonazepam 0.5 mg tablet 	60	30	Andrade Flores MD	[id number]	Insurance	[id number] 03/04/2021	03/11/2021	zolpidem tartrate 10 mg tablet 	30	30	Andrade Flores MD	[id number]	Insurance	[id number] 11/30/2020	02/05/2021	zolpidem tartrate 10 mg tablet 	30	30	Andrade Flores MD	[id number]	Insurance	[id number] 02/05/2021	02/05/2021	clonazepam 0.5 mg tablet 	60	30	Andrade Flores MD	[id number]	Insurance	[id number] 11/30/2020	12/31/2020	zolpidem tartrate 10 mg tablet 	30	30	Andrade Flores MD	[id number]	Insurance	[id number] 12/23/2020	12/31/2020	alprazolam 0.5 mg tablet 	90	30	Andrade Flores MD	[id number]	Insurance	[id number]    PLAN: D/C Lexapro 15 mg PO QD for depression and anxiety Continue Vyvanse 50 mg PO QD for ADHD and binge eating disorder Continue Cogentin 0.1 mg PO QHS for sweating.  D/C Abilify 7 mg PO QHS for depression augmentation Continue Trazodone 200 mg PO QHS for insomnia  Continue Klonopin 0.5 mg PO BID PRN for anxiety. none prescribed as she has.  Decrease Wellbutrin  to  PO QAM for depression, low motivation, energy and concentration D/C Cymbalta 60 mg PO QHS for depression, anxiety, and neuropathic pain D/C Adderall 5 mg PO QD for ADHD - Discussed risks and benefits of medications including side effects of GI and sexual side effects with SSRI. Alternative strategies including no intervention discussed with patient. Patient consents to current medications as prescribed. - Patient understands to contact clinic prn with concerns and agrees to call 911 or go to nearest ER if symptoms worsen. - Next appointment made in 1 month. Patient was not in any distress.

## 2024-06-05 NOTE — HISTORY OF PRESENT ILLNESS
[de-identified] : The following service was provided using telehealth between writer/provider and patient. The patient was at home. The writer was at clinic. Patient was alone and consented to telehealth format. All treatment plans through and including today's date were reviewed with the patient.  Patient is here for 3 month follow-up visit via telehealth.  No medication changes at that time. States she discontinued the Lexapro and the Abilify. States it has been a month that she has discontinued, and she feels fine. She also decreased the Wellbutrin  to  mg as it was hard to remember to take the 2nd dose.    Mood: Less depression and anxiety Sleepin hours per night with the Trazodone 200 mg. No sweating with the Cogentin PRN.   Appetite: much better. Binges less.  Energy, concentration and motivation are all improved. Denies any AVH, SI or HI +Drug holiday [Home] : at home, [unfilled] , at the time of the visit. [Medical Office: ([facility])___] : at the medical office located in  [Verbal consent obtained from patient] : the patient, [unfilled]
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
## 2024-06-10 ENCOUNTER — APPOINTMENT (OUTPATIENT)
Dept: ORTHOPEDIC SURGERY | Facility: CLINIC | Age: 39
End: 2024-06-10
Payer: COMMERCIAL

## 2024-06-10 VITALS — HEIGHT: 64 IN | BODY MASS INDEX: 36.54 KG/M2 | WEIGHT: 214 LBS

## 2024-06-10 DIAGNOSIS — M54.16 RADICULOPATHY, LUMBAR REGION: ICD-10-CM

## 2024-06-10 DIAGNOSIS — M47.816 SPONDYLOSIS W/OUT MYELOPATHY OR RADICULOPATHY, LUMBAR REGION: ICD-10-CM

## 2024-06-10 PROCEDURE — 99213 OFFICE O/P EST LOW 20 MIN: CPT

## 2024-06-11 RX ORDER — TRAZODONE HYDROCHLORIDE 100 MG/1
100 TABLET ORAL
Qty: 180 | Refills: 0 | Status: ACTIVE | COMMUNITY
Start: 2021-12-29 | End: 1900-01-01

## 2024-06-15 NOTE — HISTORY OF PRESENT ILLNESS
[de-identified] : 06/10/2024: Patient presenting today for an MRI results review.  Pt reports significant improvement, near resolved (99%). She has been treating with lumbar physical therapy, expected to complete it tomorrow. No pain, numbness, tingling or weakness in the lower extremities b/l.   05/10/2024: Patient presenting today for a FUV.  Pt reports 70% improvement in symptoms. She reports numbness/tingling to LT thigh. C/o stabbing pains in lumbar spine. Symptoms improved with physical therapy but still symptomatic. Pain level is 5-6/10  Pt has not been working at this time.   04/22/2024: Patient presents to the office for evaluation of mid and lower back pain. Patient reports spontaneous onset of mid and lower back pain several days ago. No recent trauma or injury. Symptoms are primarily in the back and increased with prolonged positioning, such as sitting, standing and transitions. There is some mild numbness and tingling into the anterior thighs. No changes in bowel or bladder function. Cervical spine pain is secondary to lower back. Denies numbness, tingling weakness, or dexterity changes in the upper extremities. Patient does recall a history of similar lower back pain approximately five years ago. Reports resolved with interventional pain management and spinal steroid injections. She does recall having imaging of the spine several years ago.   The patient is a 38 year old female who presents today complaining of neck, mid and low back pain Date of Injury/Onset:  1 week ago, 4/15/24 Pain:    At Rest:  10/10 With Activity:    10+/10 Mechanism of injury:   onset Quality of symptoms:  nerve pain with a pulling sensation with tingling sensation down arms and legs Improves with:  ice, heart, lido patch, NSAID's Worse with:  sitting, laying, standing, walking, Prior treatment:  no Prior Imaging: no Additional Information: None

## 2024-06-15 NOTE — DISCUSSION/SUMMARY
[de-identified] : 39 Y F with lumbar radiculopathy, lumbar facet arthrosis, essentially resolved. MRI reviewed & discussed with patient today.  Patient was educated and informed on their condition along with the expected outcomes. Complete lumbar PT and supplement with HEP. OTC NSAIDs as needed.   Moving forward I'd like to see as needed.    Prior to appointment and during encounter with patient extensive medical records were reviewed including but not limited to, hospital records, out patient records, imaging results, and lab data. During this appointment the patient was examined, diagnoses were discussed and explained in a face to face manner. In addition extensive time was spent reviewing aforementioned diagnostic studies. Counseling including abnormal image results, differential diagnoses, treatment options, risk and benefits, lifestyle changes, current condition, and current medications was performed. Patient's comments, questions, and concerns were address and patient verbalized understanding. Based on this patient's presentation at our office, which is an orthopedic spine surgeon's office, this patient inherently / intrinsically has a risk, however minute, of developing issues such as Cauda equina syndrome, bowel and bladder changes, or progression of motor or neurological deficits such as paralysis which may be permanent.   I, Tita Cisse, attest that this documentation has been prepared under the direction and in the presence of provider Andrade Larsen MD.

## 2024-06-15 NOTE — PHYSICAL EXAM
[Flexion] : flexion [de-identified] : Constitutional: - General Appearance: Unremarkable Body Habitus Well Developed Well Nourished Body Habitus No Deformities Well Groomed Ability To communicate: Normal Neurologic: Global sensation is intact to upper and lower extremities. See examination of Neck and/or Spine for exceptions. Orientation to Time, Place and Person is: Normal Mood And Affect is Normal Skin: - Head/Face, Right Upper/Lower Extremity, Left Upper/Lower Extremity: Normal See Examination of Neck and/or Spine for exceptions Cardiovascular: Peripheral Cardiovascular System is Normal Palpation of Lymph Nodes: Normal Palpation of lymph nodes in: Axilla, Cervical, Inguinal Abnormal Palpation of lymph nodes in: None  [] : non-antalgic [de-identified] : paresthesia's L3 on LT.

## 2024-06-15 NOTE — DATA REVIEWED
[Report was reviewed and noted in the chart] : The report was reviewed and noted in the chart [I independently reviewed and interpreted images and report] : I independently reviewed and interpreted images and report [FreeTextEntry1] : I have independently reviewed and interpreted these images and reports. LUMBAR MRI on 5/16/24 from [facility]. L5-S1: mild BL fs.  L4-5: mild facet arthrosis no stenosis.  Remainder of spine appears NL.    MRI [facility] 2020 Thoracic spine Stable T3/5 small syrinx  Trace disc bulging T3/4  unchanged comparison to 2017 imaging  MRI [facility] Lumbar spine 2020 Normal  Cervical spine x-ray in office today Reversal, normal cervical lordosis C4/5  Lumbar x-ray in office today  Grade 1spondylolisthesis L5/S1  Lumbar scoliosis Possibly secondary spasm No significant single level disk space collapse

## 2024-06-17 RX ORDER — SEMAGLUTIDE 1.7 MG/.75ML
1.7 INJECTION, SOLUTION SUBCUTANEOUS
Qty: 1 | Refills: 1 | Status: ACTIVE | COMMUNITY
Start: 2024-03-16 | End: 1900-01-01

## 2024-06-18 ENCOUNTER — APPOINTMENT (OUTPATIENT)
Dept: INTERNAL MEDICINE | Facility: CLINIC | Age: 39
End: 2024-06-18

## 2024-06-18 RX ORDER — TIRZEPATIDE 7.5 MG/.5ML
7.5 INJECTION, SOLUTION SUBCUTANEOUS
Qty: 1 | Refills: 3 | Status: ACTIVE | COMMUNITY
Start: 2024-06-17

## 2024-06-18 RX ORDER — TIRZEPATIDE 7.5 MG/.5ML
7.5 INJECTION, SOLUTION SUBCUTANEOUS
Qty: 1 | Refills: 1 | Status: DISCONTINUED | COMMUNITY
Start: 2024-06-03 | End: 2024-06-18

## 2024-06-25 ENCOUNTER — APPOINTMENT (OUTPATIENT)
Dept: PAIN MANAGEMENT | Facility: CLINIC | Age: 39
End: 2024-06-25

## 2024-07-10 ENCOUNTER — APPOINTMENT (OUTPATIENT)
Dept: PSYCHIATRY | Facility: CLINIC | Age: 39
End: 2024-07-10
Payer: COMMERCIAL

## 2024-07-10 PROCEDURE — 99214 OFFICE O/P EST MOD 30 MIN: CPT | Mod: 95

## 2024-07-15 ENCOUNTER — APPOINTMENT (OUTPATIENT)
Dept: PAIN MANAGEMENT | Facility: CLINIC | Age: 39
End: 2024-07-15
Payer: COMMERCIAL

## 2024-07-15 PROCEDURE — 99212 OFFICE O/P EST SF 10 MIN: CPT | Mod: 95

## 2024-07-15 RX ORDER — SERTRALINE HYDROCHLORIDE 50 MG/1
50 TABLET, FILM COATED ORAL DAILY
Qty: 30 | Refills: 0 | Status: DISCONTINUED | COMMUNITY
Start: 2024-07-10 | End: 2024-07-15

## 2024-07-16 ENCOUNTER — NON-APPOINTMENT (OUTPATIENT)
Age: 39
End: 2024-07-16

## 2024-07-16 ENCOUNTER — TRANSCRIPTION ENCOUNTER (OUTPATIENT)
Age: 39
End: 2024-07-16

## 2024-07-17 ENCOUNTER — TRANSCRIPTION ENCOUNTER (OUTPATIENT)
Age: 39
End: 2024-07-17

## 2024-07-17 RX ORDER — SEMAGLUTIDE 2.4 MG/.75ML
2.4 INJECTION, SOLUTION SUBCUTANEOUS
Qty: 3 | Refills: 1 | Status: ACTIVE | COMMUNITY
Start: 2024-07-17 | End: 1900-01-01

## 2024-08-03 ENCOUNTER — APPOINTMENT (OUTPATIENT)
Dept: INTERNAL MEDICINE | Facility: CLINIC | Age: 39
End: 2024-08-03
Payer: COMMERCIAL

## 2024-08-03 VITALS
SYSTOLIC BLOOD PRESSURE: 120 MMHG | HEIGHT: 64 IN | BODY MASS INDEX: 34.15 KG/M2 | WEIGHT: 200 LBS | DIASTOLIC BLOOD PRESSURE: 70 MMHG

## 2024-08-03 DIAGNOSIS — L65.9 NONSCARRING HAIR LOSS, UNSPECIFIED: ICD-10-CM

## 2024-08-03 DIAGNOSIS — E61.1 IRON DEFICIENCY: ICD-10-CM

## 2024-08-03 DIAGNOSIS — E66.9 OBESITY, UNSPECIFIED: ICD-10-CM

## 2024-08-03 PROCEDURE — 99214 OFFICE O/P EST MOD 30 MIN: CPT

## 2024-08-03 PROCEDURE — G2211 COMPLEX E/M VISIT ADD ON: CPT

## 2024-08-03 PROCEDURE — 36415 COLL VENOUS BLD VENIPUNCTURE: CPT

## 2024-08-03 RX ORDER — CHLORHEXIDINE GLUCONATE 4 %
325 (65 FE) LIQUID (ML) TOPICAL
Qty: 45 | Refills: 1 | Status: ACTIVE | COMMUNITY
Start: 2024-08-03

## 2024-08-03 NOTE — HISTORY OF PRESENT ILLNESS
[FreeTextEntry1] :  Obesity  [de-identified] :  Obesity - was on wegovy 2.4mg weekly & tolerating wegovy 2.4mg weelky--> took 1 dose of zepbound 7.5mg weekly --> 5 days later had belching x 1 day  of note, had had belching with wegovy 1.7mgw eekly  Hair loss - has low iron - not taking iron

## 2024-08-03 NOTE — PHYSICAL EXAM
[No Acute Distress] : no acute distress [Well Nourished] : well nourished [No Accessory Muscle Use] : no accessory muscle use [Clear to Auscultation] : lungs were clear to auscultation bilaterally [Regular Rhythm] : with a regular rhythm [Normal S1, S2] : normal S1 and S2 [No Murmur] : no murmur heard [No Edema] : there was no peripheral edema [Soft] : abdomen soft [Non Tender] : non-tender [Non-distended] : non-distended [No Masses] : no abdominal mass palpated [No HSM] : no HSM [Normal Bowel Sounds] : normal bowel sounds [Normal Insight/Judgement] : insight and judgment were intact [Normal Affect] : the affect was normal

## 2024-08-06 ENCOUNTER — APPOINTMENT (OUTPATIENT)
Dept: PSYCHIATRY | Facility: CLINIC | Age: 39
End: 2024-08-06

## 2024-08-06 PROCEDURE — 99214 OFFICE O/P EST MOD 30 MIN: CPT | Mod: 95

## 2024-08-06 NOTE — DISCUSSION/SUMMARY
[FreeTextEntry1] : Assessment: Patient is a 35 yo  female with h/o depression and anxiety seen today for medication management. . Patient is compliant with medications and tolerating without any new reported side effects. I-stop reviewed and no issues noticed.  I-STOP: Patient Name: Esha Murphy  YOB: 1985  Address: [address] [place] [address] [address] [facility] [place] [address]  Sex: Female  Rx Written	Rx Dispensed	Drug	Quantity	Days Supply	Prescriber Name	Prescriber Maritza #	Payment Method	Dispenser 12/20/2021	12/22/2021	clonazepam 0.5 mg tablet 	60	30	Andrade Flores MD	[id number]	Insurance	[id number] 09/23/2021	12/04/2021	zolpidem tartrate 10 mg tablet 	30	30	Andrade Flores MD	[id number]	Insurance	[id number] 11/20/2021	11/20/2021	pregabalin 50 mg capsule 	60	30	Andrade Flores MD	[id number]	Insurance	[id number] 09/23/2021	10/26/2021	zolpidem tartrate 10 mg tablet 	30	30	Andrade Flores MD	[id number]	Insurance	[id number] 10/25/2021	10/26/2021	clonazepam 0.5 mg tablet 	60	30	Andrade Flores MD	[id number]	Insurance	[id number] 10/08/2021	10/14/2021	pregabalin 50 mg capsule 	60	30	Noelle Noriega	[id number]	Insurance	[id number] 09/23/2021	09/25/2021	clonazepam 0.5 mg tablet 	60	30	Andrade Flores MD	[id number]	Insurance	[id number] 09/23/2021	09/25/2021	zolpidem tartrate 10 mg tablet 	30	30	Andrade Flores MD	[id number]	Insurance	[id number] 09/13/2021	09/14/2021	pregabalin 50 mg capsule 	60	30	Noelle Noriega	[id number]	Insurance	[id number] 08/14/2021	08/18/2021	clonazepam 0.5 mg tablet 	60	30	Andrade Flores MD	[id number]	Insurance	[id number] 06/09/2021	08/18/2021	zolpidem tartrate 10 mg tablet 	30	30	Andrade Flores MD	[id number]	Insurance	[id number] 06/09/2021	07/18/2021	zolpidem tartrate 10 mg tablet 	30	30	Andrade Flores MD	[id number]	Insurance	[id number] 07/14/2021	07/18/2021	clonazepam 0.5 mg tablet 	60	30	Andrade Flores MD	[id number]	Insurance	[id number] 07/09/2021	07/09/2021	hydrocodone-acetaminophen 5-325 mg tablet 	12	2	Alberto Ballard	[id number]	Insurance	[id number] 06/21/2021	06/21/2021	hydrocodone-acetaminophen 5-325 mg tablet 	18	3	Alberto Ballard	[id number]	Insurance	[id number] 06/09/2021	06/14/2021	zolpidem tartrate 10 mg tablet 	30	30	Andrade Flores MD	[id number]	Insurance	[id number]  Patient Name: Esha Murphy  YOB: 1985  Address: [address] [place] [address]  Sex: Female  Rx Written	Rx Dispensed	Drug	Quantity	Days Supply	Prescriber Name	Prescriber Maritza #	Payment Method	Dispenser 08/18/2021	08/19/2021	pregabalin 25 mg capsule 	60	30	Noelle Noriega	[id number]	Insurance	[facility] [id number] 06/02/2021	06/04/2021	clonazepam 0.5 mg tablet 	60	30	Kristin Dunham (DO)	[id number]	Insurance	[id number] 03/04/2021	05/14/2021	zolpidem tartrate 10 mg tablet 	30	30	Andrade Flores MD	[id number]	Insurance	[id number] 03/04/2021	04/09/2021	zolpidem tartrate 10 mg tablet 	30	30	Andrade Flores MD	[id number]	Insurance	[id number] 03/27/2021	03/30/2021	clonazepam 0.5 mg tablet 	60	30	Andrade Flores MD	[id number]	Insurance	[id number] 03/04/2021	03/11/2021	zolpidem tartrate 10 mg tablet 	30	30	Andrade Flores MD	[id number]	Insurance	[id number] 11/30/2020	02/05/2021	zolpidem tartrate 10 mg tablet 	30	30	Andrade Flores MD	[id number]	Insurance	[facility] 02/05/2021	02/05/2021	clonazepam 0.5 mg tablet 	60	30	Andrade Flores MD	[id number]	Insurance	[id number] 11/30/2020	12/31/2020	zolpidem tartrate 10 mg tablet 	30	30	Andrade Flores MD	[id number]	Insurance	[id number] 12/23/2020	12/31/2020	alprazolam 0.5 mg tablet 	90	30	Andrade Flores MD	[id number]	Insurance	[id number]    PLAN: Start Vistaril 25 mg PO TID PRN for insomnia Continue Vyvanse 50 mg PO QD for ADHD and binge eating disorder Continue Cogentin 0.1 mg PO QHS for sweating.  Continue Trazodone 200 mg PO QHS for insomnia  Continue Klonopin 0.5 mg PO BID PRN for anxiety. none prescribed as she has.  D/C Zoloft 50 mg PO QD for depression and anxiety D/C Wellbutrin  PO QAM for depression, low motivation, energy and concentration D/C Lexapro 15 mg PO QD for depression and anxiety D/C Abilify 7 mg PO QHS for depression augmentation D/C Cymbalta 60 mg PO QHS for depression, anxiety, and neuropathic pain D/C Adderall 5 mg PO QD for ADHD - Discussed risks and benefits of medications including side effects of GI and sexual side effects with SSRI. Alternative strategies including no intervention discussed with patient. Patient consents to current medications as prescribed. - Patient understands to contact clinic prn with concerns and agrees to call 911 or go to nearest ER if symptoms worsen. - Next appointment made in 1 month. Patient was not in any distress.
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
## 2024-08-06 NOTE — HISTORY OF PRESENT ILLNESS
[de-identified] : The following service was provided using telehealth between writer/provider and patient. The patient was at home. The writer was at clinic. Patient was alone and consented to telehealth format. All treatment plans through and including today's date were reviewed with the patient.  Patient is here for 1 month follow-up visit via telehealth.  Last month Zoloft 50 mg was started on the patient. Patient states she took it for 3-4 days and stopped it as she was feeling good. Felt the anxiety she was addressing tot he writer was situational and that she is able to have a better handle on the anxiety than before. Patient also discontinued the Wellbutrin. Only problem patient has now if staying asleep. She wakes up in the middle of the night around midnight and is not able to go back to sleep. Going on for the past 3 weeks. Denies thinking the Vyvanse is the culprit causing her not to go to sleep.    Mood: is stable. Less depression and anxiety. No Klonopin for more than a month.  Sleepin-6 hours broken. Less sweating with the Cogentin. Takes it PRN.  Appetite is better. Binges less.  Energy, concentration and motivation are all improved with the Vyvanse. Denies any AVH, SI or HI +Drug holiday [Home] : at home, [unfilled] , at the time of the visit. [Medical Office: ([facility])___] : at the medical office located in  [Verbal consent obtained from patient] : the patient, [unfilled]

## 2024-08-06 NOTE — HISTORY OF PRESENT ILLNESS
[de-identified] : The following service was provided using telehealth between writer/provider and patient. The patient was at home. The writer was at clinic. Patient was alone and consented to telehealth format. All treatment plans through and including today's date were reviewed with the patient.  Patient is here for 1 month follow-up visit via telehealth.  Last month Zoloft 50 mg was started on the patient. Patient states she took it for 3-4 days and stopped it as she was feeling good. Felt the anxiety she was addressing tot he writer was situational and that she is able to have a better handle on the anxiety than before. Patient also discontinued the Wellbutrin. Only problem patient has now if staying asleep. She wakes up in the middle of the night around midnight and is not able to go back to sleep.   Mood: is stable. Less depression and anxiety Sleepin-6 hours broken.  Appetite is better. Binges less.  Energy, concentration and motivation are all improved with the Vyvanse. Denies any AVH, SI or HI +Drug holiday [Home] : at home, [unfilled] , at the time of the visit. [Medical Office: ([facility])___] : at the medical office located in  [Verbal consent obtained from patient] : the patient, [unfilled]
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
## 2024-08-06 NOTE — DISCUSSION/SUMMARY
[FreeTextEntry1] : Assessment: Patient is a 37 yo  female with h/o depression and anxiety seen today for medication management. . Patient is compliant with medications and tolerating without any new reported side effects. I-stop reviewed and no issues noticed.  I-STOP: Patient Name: Esha Murphy  YOB: 1985  Address: [address] [place] [address] [place] [facility] [place] [address]  Sex: Female  Rx Written	Rx Dispensed	Drug	Quantity	Days Supply	Prescriber Name	Prescriber Maritza #	Payment Method	Dispenser 12/20/2021	12/22/2021	clonazepam 0.5 mg tablet 	60	30	Andrade Flores MD	[id number]	Insurance	[id number] 09/23/2021	12/04/2021	zolpidem tartrate 10 mg tablet 	30	30	Andrade Flores MD	[id number]	Insurance	[id number] 11/20/2021	11/20/2021	pregabalin 50 mg capsule 	60	30	Andrade Flores MD	[id number]	Insurance	[id number] 09/23/2021	10/26/2021	zolpidem tartrate 10 mg tablet 	30	30	Andrade Flores MD	[id number]	Insurance	[id number] 10/25/2021	10/26/2021	clonazepam 0.5 mg tablet 	60	30	Andrade Flores MD	[id number]	Insurance	[id number] 10/08/2021	10/14/2021	pregabalin 50 mg capsule 	60	30	Noelle Noriega	[id number]	Insurance	[id number] 09/23/2021	09/25/2021	clonazepam 0.5 mg tablet 	60	30	Andrade Flores MD	[id number]	Insurance	[id number] 09/23/2021	09/25/2021	zolpidem tartrate 10 mg tablet 	30	30	Andrade Flores MD	[id number]	Insurance	[id number] 09/13/2021	09/14/2021	pregabalin 50 mg capsule 	60	30	Noelle Noriega	[id number]	Insurance	[id number] 08/14/2021	08/18/2021	clonazepam 0.5 mg tablet 	60	30	Andrade Flores MD	[id number]	Insurance	[id number] 06/09/2021	08/18/2021	zolpidem tartrate 10 mg tablet 	30	30	Andrade Flores MD	[id number]	Insurance	[id number] 06/09/2021	07/18/2021	zolpidem tartrate 10 mg tablet 	30	30	Andrade Flores MD	[id number]	Insurance	[id number] 07/14/2021	07/18/2021	clonazepam 0.5 mg tablet 	60	30	Andrade Flores MD	[id number]	Insurance	[id number] 07/09/2021	07/09/2021	hydrocodone-acetaminophen 5-325 mg tablet 	12	2	Alberto Ballard	[id number]	Insurance	[id number] 06/21/2021	06/21/2021	hydrocodone-acetaminophen 5-325 mg tablet 	18	3	Alberto Ballard	[id number]	Insurance	[id number] 06/09/2021	06/14/2021	zolpidem tartrate 10 mg tablet 	30	30	Andrade Flores MD	[id number]	Insurance	[id number]  Patient Name: Esha Murphy  YOB: 1985  Address: [address] [place] [address]  Sex: Female  Rx Written	Rx Dispensed	Drug	Quantity	Days Supply	Prescriber Name	Prescriber Maritza #	Payment Method	Dispenser 08/18/2021	08/19/2021	pregabalin 25 mg capsule 	60	30	Noelle Noriega	[id number]	Insurance	[facility] [id number] 06/02/2021	06/04/2021	clonazepam 0.5 mg tablet 	60	30	Kristin Dunham (DO)	[id number]	Insurance	[id number] 03/04/2021	05/14/2021	zolpidem tartrate 10 mg tablet 	30	30	Andrade Flores MD	[id number]	Insurance	[id number] 03/04/2021	04/09/2021	zolpidem tartrate 10 mg tablet 	30	30	Andrade Flores MD	[id number]	Insurance	[id number] 03/27/2021	03/30/2021	clonazepam 0.5 mg tablet 	60	30	Andrade Flores MD	[id number]	Insurance	[id number] 03/04/2021	03/11/2021	zolpidem tartrate 10 mg tablet 	30	30	Andrade Flores MD	[id number]	Insurance	[id number] 11/30/2020	02/05/2021	zolpidem tartrate 10 mg tablet 	30	30	Andrade Flores MD	[id number]	Insurance	[id number] 02/05/2021	02/05/2021	clonazepam 0.5 mg tablet 	60	30	Andrade Flores MD	[id number]	Insurance	[id number] 11/30/2020	12/31/2020	zolpidem tartrate 10 mg tablet 	30	30	Andrade Flores MD	[id number]	Insurance	[id number] 12/23/2020	12/31/2020	alprazolam 0.5 mg tablet 	90	30	Andrade Flores MD	[id number]	Insurance	[id number]    PLAN: Start Vistaril 25 mg PO TID PRN for insomnia Continue Vyvanse 50 mg PO QD for ADHD and binge eating disorder Continue Cogentin 0.1 mg PO QHS for sweating.  Continue Trazodone 200 mg PO QHS for insomnia  Continue Klonopin 0.5 mg PO BID PRN for anxiety. none prescribed as she has.  D/C Zoloft 50 mg PO QD for depression and anxiety D/C Wellbutrin  PO QAM for depression, low motivation, energy and concentration D/C Lexapro 15 mg PO QD for depression and anxiety D/C Abilify 7 mg PO QHS for depression augmentation D/C Cymbalta 60 mg PO QHS for depression, anxiety, and neuropathic pain D/C Adderall 5 mg PO QD for ADHD - Discussed risks and benefits of medications including side effects of GI and sexual side effects with SSRI. Alternative strategies including no intervention discussed with patient. Patient consents to current medications as prescribed. - Patient understands to contact clinic prn with concerns and agrees to call 911 or go to nearest ER if symptoms worsen. - Next appointment made in 1 month. Patient was not in any distress.

## 2024-08-07 ENCOUNTER — TRANSCRIPTION ENCOUNTER (OUTPATIENT)
Age: 39
End: 2024-08-07

## 2024-08-07 LAB
ALBUMIN SERPL ELPH-MCNC: 4.3 G/DL
ALP BLD-CCNC: 70 U/L
ALT SERPL-CCNC: 10 U/L
ANION GAP SERPL CALC-SCNC: 13 MMOL/L
APPEARANCE: CLEAR
AST SERPL-CCNC: 14 U/L
BACTERIA UR CULT: NORMAL
BACTERIA: NEGATIVE /HPF
BASOPHILS # BLD AUTO: 0.02 K/UL
BASOPHILS NFR BLD AUTO: 0.3 %
BILIRUB SERPL-MCNC: 0.2 MG/DL
BILIRUBIN URINE: NEGATIVE
BLOOD URINE: NEGATIVE
BUN SERPL-MCNC: 11 MG/DL
CALCIUM SERPL-MCNC: 9.2 MG/DL
CAST: 0 /LPF
CHLORIDE SERPL-SCNC: 104 MMOL/L
CHOLEST SERPL-MCNC: 172 MG/DL
CO2 SERPL-SCNC: 21 MMOL/L
COLOR: YELLOW
CREAT SERPL-MCNC: 0.77 MG/DL
EGFR: 101 ML/MIN/1.73M2
EOSINOPHIL # BLD AUTO: 0.13 K/UL
EOSINOPHIL NFR BLD AUTO: 1.8 %
EPITHELIAL CELLS: 2 /HPF
ESTIMATED AVERAGE GLUCOSE: 103 MG/DL
FERRITIN SERPL-MCNC: 20 NG/ML
GLUCOSE QUALITATIVE U: NEGATIVE MG/DL
GLUCOSE SERPL-MCNC: 70 MG/DL
HBA1C MFR BLD HPLC: 5.2 %
HCT VFR BLD CALC: 39.2 %
HDLC SERPL-MCNC: 46 MG/DL
HGB BLD-MCNC: 12.5 G/DL
IMM GRANULOCYTES NFR BLD AUTO: 0.4 %
IRON SATN MFR SERPL: 16 %
IRON SERPL-MCNC: 58 UG/DL
KETONES URINE: NEGATIVE MG/DL
LDLC SERPL CALC-MCNC: 110 MG/DL
LEUKOCYTE ESTERASE URINE: NEGATIVE
LYMPHOCYTES # BLD AUTO: 1.99 K/UL
LYMPHOCYTES NFR BLD AUTO: 26.9 %
MAN DIFF?: NORMAL
MCHC RBC-ENTMCNC: 26.5 PG
MCHC RBC-ENTMCNC: 31.9 GM/DL
MCV RBC AUTO: 83.2 FL
MICROSCOPIC-UA: NORMAL
MONOCYTES # BLD AUTO: 0.62 K/UL
MONOCYTES NFR BLD AUTO: 8.4 %
NEUTROPHILS # BLD AUTO: 4.61 K/UL
NEUTROPHILS NFR BLD AUTO: 62.2 %
NITRITE URINE: NEGATIVE
NONHDLC SERPL-MCNC: 126 MG/DL
PH URINE: 6.5
PLATELET # BLD AUTO: 302 K/UL
POTASSIUM SERPL-SCNC: 4.3 MMOL/L
PROT SERPL-MCNC: 6.8 G/DL
PROTEIN URINE: NEGATIVE MG/DL
RBC # BLD: 4.71 M/UL
RBC # FLD: 14 %
RED BLOOD CELLS URINE: 1 /HPF
SODIUM SERPL-SCNC: 138 MMOL/L
SPECIFIC GRAVITY URINE: 1.01
T4 FREE SERPL-MCNC: 1.5 NG/DL
TIBC SERPL-MCNC: 363 UG/DL
TRIGL SERPL-MCNC: 83 MG/DL
TSH SERPL-ACNC: 1.68 UIU/ML
UIBC SERPL-MCNC: 305 UG/DL
UROBILINOGEN URINE: 0.2 MG/DL
WBC # FLD AUTO: 7.4 K/UL
WHITE BLOOD CELLS URINE: 0 /HPF

## 2024-08-19 ENCOUNTER — NON-APPOINTMENT (OUTPATIENT)
Age: 39
End: 2024-08-19

## 2024-08-20 ENCOUNTER — APPOINTMENT (OUTPATIENT)
Dept: INTERNAL MEDICINE | Facility: CLINIC | Age: 39
End: 2024-08-20
Payer: COMMERCIAL

## 2024-08-20 DIAGNOSIS — U07.1 COVID-19: ICD-10-CM

## 2024-08-20 PROCEDURE — 99441: CPT

## 2024-08-20 RX ORDER — GUAIFENESIN AND CODEINE PHOSPHATE 10; 100 MG/5ML; MG/5ML
100-10 SOLUTION ORAL
Qty: 210 | Refills: 0 | Status: ACTIVE | COMMUNITY
Start: 2024-08-20 | End: 1900-01-01

## 2024-08-20 RX ORDER — TRAZODONE HYDROCHLORIDE 100 MG/1
100 TABLET ORAL DAILY
Refills: 0 | Status: ACTIVE | COMMUNITY
Start: 2024-08-20

## 2024-08-21 ENCOUNTER — NON-APPOINTMENT (OUTPATIENT)
Age: 39
End: 2024-08-21

## 2024-08-27 ENCOUNTER — APPOINTMENT (OUTPATIENT)
Dept: DERMATOLOGY | Facility: CLINIC | Age: 39
End: 2024-08-27

## 2024-08-30 ENCOUNTER — TRANSCRIPTION ENCOUNTER (OUTPATIENT)
Age: 39
End: 2024-08-30

## 2024-09-10 ENCOUNTER — APPOINTMENT (OUTPATIENT)
Dept: ORTHOPEDIC SURGERY | Facility: CLINIC | Age: 39
End: 2024-09-10
Payer: COMMERCIAL

## 2024-09-10 VITALS — WEIGHT: 200 LBS | BODY MASS INDEX: 34.15 KG/M2 | HEIGHT: 64 IN

## 2024-09-10 DIAGNOSIS — M62.838 OTHER MUSCLE SPASM: ICD-10-CM

## 2024-09-10 DIAGNOSIS — M75.32 CALCIFIC TENDINITIS OF LEFT SHOULDER: ICD-10-CM

## 2024-09-10 PROCEDURE — 73010 X-RAY EXAM OF SHOULDER BLADE: CPT | Mod: LT

## 2024-09-10 PROCEDURE — 73030 X-RAY EXAM OF SHOULDER: CPT | Mod: LT

## 2024-09-10 PROCEDURE — 99214 OFFICE O/P EST MOD 30 MIN: CPT

## 2024-09-10 RX ORDER — METHYLPREDNISOLONE 4 MG/1
4 TABLET ORAL
Qty: 1 | Refills: 0 | Status: ACTIVE | COMMUNITY
Start: 2024-09-10 | End: 1900-01-01

## 2024-09-10 RX ORDER — CYCLOBENZAPRINE HYDROCHLORIDE 5 MG/1
5 TABLET, FILM COATED ORAL
Qty: 30 | Refills: 0 | Status: ACTIVE | COMMUNITY
Start: 2024-09-10 | End: 1900-01-01

## 2024-09-12 NOTE — PHYSICAL EXAM
[Left] : left shoulder [Sitting] : sitting [Moderate] : moderate [] : no erythema [TWNoteComboBox7] : active forward flexion 120 degrees [de-identified] : active abduction 100 degrees [TWNoteComboBox6] : internal rotation L1

## 2024-09-12 NOTE — DISCUSSION/SUMMARY
[de-identified] : 40yo F with left calcific tendonitis and trapezial spasm  1) MDP rx. discussed r/b/a  2) cyclobenzaprine rx - patient understands that she should not drive or operate machinery while on this medication  3) start PT  4) rtc 3-4 weeks, consider injection

## 2024-09-12 NOTE — DISCUSSION/SUMMARY
[de-identified] : 38yo F with left calcific tendonitis and trapezial spasm  1) MDP rx. discussed r/b/a  2) cyclobenzaprine rx - patient understands that she should not drive or operate machinery while on this medication  3) start PT  4) rtc 3-4 weeks, consider injection

## 2024-09-12 NOTE — PHYSICAL EXAM
[Left] : left shoulder [Sitting] : sitting [Moderate] : moderate [] : no erythema [TWNoteComboBox7] : active forward flexion 120 degrees [de-identified] : active abduction 100 degrees [TWNoteComboBox6] : internal rotation L1

## 2024-09-12 NOTE — DISCUSSION/SUMMARY
[de-identified] : 40yo F with left calcific tendonitis and trapezial spasm  1) MDP rx. discussed r/b/a  2) cyclobenzaprine rx - patient understands that she should not drive or operate machinery while on this medication  3) start PT  4) rtc 3-4 weeks, consider injection

## 2024-09-12 NOTE — HISTORY OF PRESENT ILLNESS
[8] : 8 [Full time] : Work status: full time [FreeTextEntry7] : to right hand [de-identified] : 9/10/24: 38yo female presenting with LT shoulder pain. No known injury/fall. Experiencing sharp/shooting pain for 2 weeks. No past treatment.  [] : no [FreeTextEntry1] : LT shoulder  [FreeTextEntry5] : 38yo female presenting with LT shoulder pain. No known injury/fall. Experiencing sharp/shooting pain for 2 weeks. No past treatment.  [de-identified] : [employer]

## 2024-09-12 NOTE — PHYSICAL EXAM
[Left] : left shoulder [Sitting] : sitting [Moderate] : moderate [] : no erythema [TWNoteComboBox7] : active forward flexion 120 degrees [de-identified] : active abduction 100 degrees [TWNoteComboBox6] : internal rotation L1

## 2024-09-12 NOTE — HISTORY OF PRESENT ILLNESS
[8] : 8 [Full time] : Work status: full time [FreeTextEntry7] : to right hand [de-identified] : 9/10/24: 38yo female presenting with LT shoulder pain. No known injury/fall. Experiencing sharp/shooting pain for 2 weeks. No past treatment.  [] : no [FreeTextEntry1] : LT shoulder  [FreeTextEntry5] : 38yo female presenting with LT shoulder pain. No known injury/fall. Experiencing sharp/shooting pain for 2 weeks. No past treatment.  [de-identified] : [employer]

## 2024-09-13 DIAGNOSIS — G43.001 MIGRAINE W/OUT AURA, NOT INTRACTABLE, WITH STATUS MIGRAINOSUS: ICD-10-CM

## 2024-09-15 ENCOUNTER — NON-APPOINTMENT (OUTPATIENT)
Age: 39
End: 2024-09-15

## 2024-09-16 ENCOUNTER — APPOINTMENT (OUTPATIENT)
Dept: PAIN MANAGEMENT | Facility: CLINIC | Age: 39
End: 2024-09-16
Payer: COMMERCIAL

## 2024-09-16 VITALS
DIASTOLIC BLOOD PRESSURE: 87 MMHG | WEIGHT: 190 LBS | HEART RATE: 101 BPM | HEIGHT: 64 IN | BODY MASS INDEX: 32.44 KG/M2 | SYSTOLIC BLOOD PRESSURE: 117 MMHG

## 2024-09-16 PROCEDURE — 99213 OFFICE O/P EST LOW 20 MIN: CPT

## 2024-09-16 NOTE — ASSESSMENT
[FreeTextEntry1] : Migraine & IIH  Pt to follow up with Neuro - Opthal  Will re- start Ajovy  Continue Diamox.

## 2024-09-16 NOTE — PHYSICAL EXAM
[General Appearance - Alert] : alert [General Appearance - In No Acute Distress] : in no acute distress [General Appearance - Well Nourished] : well nourished [General Appearance - Well Developed] : well developed [General Appearance - Well-Appearing] : healthy appearing [Oriented To Time, Place, And Person] : oriented to person, place, and time [Affect] : the affect was normal [Mood] : the mood was normal [Cranial Nerves Facial (VII)] : face symmetrical [Sclera] : the sclera and conjunctiva were normal [PERRL With Normal Accommodation] : pupils were equal in size, round, reactive to light, with normal accommodation [Extraocular Movements] : extraocular movements were intact [] : no respiratory distress

## 2024-09-16 NOTE — HISTORY OF PRESENT ILLNESS
[FreeTextEntry1] : Pt returns today for a follow up appt for migraine and PTC. Has had an increase in to 5-7 days / week of migraine . Does have an injury to her left shoulder that is radiates to neck . Pt is currently on a steroid.  She does report migraine frequency did increase before she injure her shoulder.   Is due for a dilate eye exa, to measure pressure. She is takung Diamox 500mg BID and prn Ubrelvy.

## 2024-09-17 ENCOUNTER — APPOINTMENT (OUTPATIENT)
Dept: PSYCHIATRY | Facility: CLINIC | Age: 39
End: 2024-09-17
Payer: COMMERCIAL

## 2024-09-17 PROCEDURE — 99214 OFFICE O/P EST MOD 30 MIN: CPT | Mod: 95

## 2024-09-17 RX ORDER — FREMANEZUMAB-VFRM 225 MG/1.5ML
225 INJECTION SUBCUTANEOUS
Qty: 1 | Refills: 6 | Status: ACTIVE | COMMUNITY
Start: 2024-09-16 | End: 1900-01-01

## 2024-09-17 NOTE — HISTORY OF PRESENT ILLNESS
[de-identified] : The following service was provided using telehealth between writer/provider and patient. The patient was at home. The writer was at clinic. Patient was alone and consented to telehealth format. All treatment plans through and including today's date were reviewed with the patient.  Patient is here for 1 month follow-up visit via telehealth.  Last month Vistaril 25 mg PO TID was started on the patient. States she loves it. She is sleeping much better. No more waking up in the middle of the night.   Mood: is stable. Less depression. Anxiety is there but states it is manageable. Using coping skills. Has no more Klonopin.   Sleeping: better on the Vistaril 25 mg. Gets 8-9 hours. Less sweating with the Cogentin. Takes it PRN.  Appetite is better. Binges less.  Energy is 50/50, concentration and motivation are all improved with the Vyvanse. Denies any AVH, SI or HI +Drug holiday [Home] : at home, [unfilled] , at the time of the visit. [Medical Office: ([facility])___] : at the medical office located in  [Verbal consent obtained from patient] : the patient, [unfilled]

## 2024-09-17 NOTE — DISCUSSION/SUMMARY
[FreeTextEntry1] : Assessment: Patient is a 35 yo  female with h/o depression and anxiety seen today for medication management. . Patient is compliant with medications and tolerating without any new reported side effects. I-stop reviewed and no issues noticed.  I-STOP: Patient Name: Esha Murphy  YOB: 1985  Address: [address] [place] [address] AVE [facility] [place] [address]  Sex: Female  Rx Written	Rx Dispensed	Drug	Quantity	Days Supply	Prescriber Name	Prescriber Maritza #	Payment Method	Dispenser 12/20/2021	12/22/2021	clonazepam 0.5 mg tablet 	60	30	Andrade Flores MD	[id number]	Insurance	[id number] 09/23/2021	12/04/2021	zolpidem tartrate 10 mg tablet 	30	30	Andrade Flores MD	[id number]	Insurance	[id number] 11/20/2021	11/20/2021	pregabalin 50 mg capsule 	60	30	Andrade Flores MD	[id number]	Insurance	[id number] 09/23/2021	10/26/2021	zolpidem tartrate 10 mg tablet 	30	30	Andrade Flores MD	[id number]	Insurance	[id number] 10/25/2021	10/26/2021	clonazepam 0.5 mg tablet 	60	30	Andrade Flores MD	[id number]	Insurance	[id number] 10/08/2021	10/14/2021	pregabalin 50 mg capsule 	60	30	Noelle Noriega	[id number]	Insurance	[id number] 09/23/2021	09/25/2021	clonazepam 0.5 mg tablet 	60	30	Andrade Flores MD	[id number]	Insurance	[id number] 09/23/2021	09/25/2021	zolpidem tartrate 10 mg tablet 	30	30	Andrade Flores MD	[id number]	Insurance	[id number] 09/13/2021	09/14/2021	pregabalin 50 mg capsule 	60	30	Noelle Noriega	[id number]	Insurance	[id number] 08/14/2021	08/18/2021	clonazepam 0.5 mg tablet 	60	30	Andrade Flores MD	[id number]	Insurance	[id number] 06/09/2021	08/18/2021	zolpidem tartrate 10 mg tablet 	30	30	Andrade Flores MD	[id number]	Insurance	[id number] 06/09/2021	07/18/2021	zolpidem tartrate 10 mg tablet 	30	30	Andrade lFores MD	[id number]	Insurance	[id number] 07/14/2021	07/18/2021	clonazepam 0.5 mg tablet 	60	30	Andrade Flores MD	[id number]	Insurance	[id number] 07/09/2021	07/09/2021	hydrocodone-acetaminophen 5-325 mg tablet 	12	2	Alberto Ballard	[id number]	Insurance	[id number] 06/21/2021	06/21/2021	hydrocodone-acetaminophen 5-325 mg tablet 	18	3	Alberto Ballard	[id number]	Insurance	[id number] 06/09/2021	06/14/2021	zolpidem tartrate 10 mg tablet 	30	30	Andrade Flores MD	[id number]	Insurance	[id number]  Patient Name: Esha Murphy  YOB: 1985  Address: [address] [place] [address]  Sex: Female  Rx Written	Rx Dispensed	Drug	Quantity	Days Supply	Prescriber Name	Prescriber Maritza #	Payment Method	Dispenser 08/18/2021	08/19/2021	pregabalin 25 mg capsule 	60	30	Noelle Noriega	[id number]	Insurance	[facility] [id number] 06/02/2021	06/04/2021	clonazepam 0.5 mg tablet 	60	30	Kristin Dunham (DO)	[id number]	Insurance	[id number] 03/04/2021	05/14/2021	zolpidem tartrate 10 mg tablet 	30	30	Andrade Flores MD	[id number]	Insurance	[id number] 03/04/2021	04/09/2021	zolpidem tartrate 10 mg tablet 	30	30	Andrade Flores MD	[id number]	Insurance	[id number] 03/27/2021	03/30/2021	clonazepam 0.5 mg tablet 	60	30	Andrade Flores MD	[id number]	Insurance	[id number] 03/04/2021	03/11/2021	zolpidem tartrate 10 mg tablet 	30	30	Andrade Flores MD	[id number]	Insurance	[id number] 11/30/2020	02/05/2021	zolpidem tartrate 10 mg tablet 	30	30	Andrade Flores MD	[id number]	Insurance	[id number] 02/05/2021	02/05/2021	clonazepam 0.5 mg tablet 	60	30	Andrade Flores MD	[id number]	Insurance	[id number] 11/30/2020	12/31/2020	zolpidem tartrate 10 mg tablet 	30	30	Andrade Flores MD	[id number]	Insurance	[id number] 12/23/2020	12/31/2020	alprazolam 0.5 mg tablet 	90	30	Andrade Flores MD	[id number]	Insurance	[id number]    PLAN: Continue Vistaril 25 mg PO TID PRN for insomnia Continue Vyvanse 50 mg PO QD for ADHD and binge eating disorder Continue Cogentin 0.1 mg PO QHS for sweating.  Continue Trazodone 200 mg PO QHS for insomnia  Continue Klonopin 0.5 mg PO BID PRN for anxiety.  D/C Zoloft 50 mg PO QD for depression and anxiety D/C Wellbutrin  PO QAM for depression, low motivation, energy and concentration D/C Lexapro 15 mg PO QD for depression and anxiety D/C Abilify 7 mg PO QHS for depression augmentation D/C Cymbalta 60 mg PO QHS for depression, anxiety, and neuropathic pain D/C Adderall 5 mg PO QD for ADHD - Discussed risks and benefits of medications including side effects of GI and sexual side effects with SSRI. Alternative strategies including no intervention discussed with patient. Patient consents to current medications as prescribed. - Patient understands to contact clinic prn with concerns and agrees to call 911 or go to nearest ER if symptoms worsen. - Next appointment made in 3 month. Patient was not in any distress.

## 2024-09-27 RX ORDER — TRAMADOL HYDROCHLORIDE 50 MG/1
50 TABLET, COATED ORAL EVERY 8 HOURS
Qty: 15 | Refills: 0 | Status: ACTIVE | COMMUNITY
Start: 2024-09-27 | End: 1900-01-01

## 2024-09-30 ENCOUNTER — NON-APPOINTMENT (OUTPATIENT)
Age: 39
End: 2024-09-30

## 2024-10-01 ENCOUNTER — APPOINTMENT (OUTPATIENT)
Dept: DERMATOLOGY | Facility: CLINIC | Age: 39
End: 2024-10-01
Payer: COMMERCIAL

## 2024-10-01 VITALS
TEMPERATURE: 98.7 F | OXYGEN SATURATION: 97 % | DIASTOLIC BLOOD PRESSURE: 70 MMHG | BODY MASS INDEX: 32.44 KG/M2 | WEIGHT: 189 LBS | SYSTOLIC BLOOD PRESSURE: 140 MMHG

## 2024-10-01 DIAGNOSIS — L65.9 NONSCARRING HAIR LOSS, UNSPECIFIED: ICD-10-CM

## 2024-10-01 DIAGNOSIS — L21.9 SEBORRHEIC DERMATITIS, UNSPECIFIED: ICD-10-CM

## 2024-10-01 PROCEDURE — 99204 OFFICE O/P NEW MOD 45 MIN: CPT

## 2024-10-01 RX ORDER — KETOCONAZOLE 20 MG/ML
2 SUSPENSION TOPICAL
Qty: 1 | Refills: 2 | Status: ACTIVE | COMMUNITY
Start: 2024-10-01 | End: 1900-01-01

## 2024-10-01 NOTE — HISTORY OF PRESENT ILLNESS
[FreeTextEntry1] : hair loss [de-identified] : 40yo F presents for hair loss present for 7-8 months, has noticed increased hair shedding on zepbound, has lost 20 lbs labs reviewed: CBC, CMP, iron, TIB, ferritin, TSH wnl vit D low 20.6 - taking supplements denies recent illness no pregnancy mom with hx of hair loss started topical minoxidil 2% soln daily for the last 4 mos, not sure if she sees improvement has some flaking, itch on the scalp Abbe Flap (Lower To Upper Lip) Text: The defect of the upper lip was assessed and measured.  Given the location and size of the defect, an Abbe flap was deemed most appropriate.  Using a sterile surgical marker, an appropriate Abbe flap was measured and drawn on the lower lip. Local anesthesia was then infiltrated. A scalpel was then used to incise the upper lip through and through the skin, vermilion, muscle and mucosa, leaving the flap pedicled on the opposite side.  The flap was then rotated and transferred to the lower lip defect.  The flap was then sutured into place with a three layer technique, closing the orbicularis oris muscle layer with subcutaneous buried sutures, followed by a mucosal layer and an epidermal layer.

## 2024-10-01 NOTE — PHYSICAL EXAM
[Alert] : alert [Oriented x 3] : ~L oriented x 3 [Declined] : declined [FreeTextEntry3] : Focused exam: -negative hair pull test -widening of hair part, mild thinning on frontal, crown of scalp

## 2024-10-01 NOTE — ASSESSMENT
[FreeTextEntry1] : #Hair loss - favor TE and superimposed AGA -chronic, flaring -I have discussed the chronic nature and course of this condition -labs reviewed - continue vit D supplement -discussed tx options including topical and oral minoxidil, reviewed risks, potential SE of each - Start minoxidil 5% foam or solution to affected areas daily. Proper medication use and side effects discussed, including unwanted hair growth in unintended treated areas. Advised that medication should be continued to maintain any clinical effects. Counseled best results around 4 months and may have shedding around 2 weeks, which can last 4-6 weeks. can consider po minoxidil if worsening  #Seb derm - scalp -chronic, flaring -I have discussed the chronic nature and course of this condition -start ketoconazole 2% shampoo 2-3 times per week, leave on for 5-7 mins before rinsing out   RTC PRN

## 2024-10-02 ENCOUNTER — NON-APPOINTMENT (OUTPATIENT)
Age: 39
End: 2024-10-02

## 2024-10-02 ENCOUNTER — APPOINTMENT (OUTPATIENT)
Dept: INTERNAL MEDICINE | Facility: CLINIC | Age: 39
End: 2024-10-02
Payer: COMMERCIAL

## 2024-10-02 VITALS
BODY MASS INDEX: 32.27 KG/M2 | HEIGHT: 64 IN | SYSTOLIC BLOOD PRESSURE: 120 MMHG | WEIGHT: 189 LBS | DIASTOLIC BLOOD PRESSURE: 80 MMHG

## 2024-10-02 DIAGNOSIS — R42 DIZZINESS AND GIDDINESS: ICD-10-CM

## 2024-10-02 PROCEDURE — 99213 OFFICE O/P EST LOW 20 MIN: CPT

## 2024-10-07 ENCOUNTER — APPOINTMENT (OUTPATIENT)
Dept: PAIN MANAGEMENT | Facility: CLINIC | Age: 39
End: 2024-10-07
Payer: COMMERCIAL

## 2024-10-07 VITALS
HEART RATE: 93 BPM | HEIGHT: 64 IN | SYSTOLIC BLOOD PRESSURE: 114 MMHG | WEIGHT: 188 LBS | BODY MASS INDEX: 32.1 KG/M2 | DIASTOLIC BLOOD PRESSURE: 76 MMHG

## 2024-10-07 PROBLEM — R42 EPISODIC LIGHTHEADEDNESS: Status: ACTIVE | Noted: 2024-10-07

## 2024-10-07 PROCEDURE — 99213 OFFICE O/P EST LOW 20 MIN: CPT

## 2024-10-07 PROCEDURE — G2211 COMPLEX E/M VISIT ADD ON: CPT

## 2024-10-07 NOTE — PHYSICAL EXAM
[No Acute Distress] : no acute distress [Well Nourished] : well nourished [PERRL] : pupils equal round and reactive to light [EOMI] : extraocular movements intact [No Accessory Muscle Use] : no accessory muscle use [Clear to Auscultation] : lungs were clear to auscultation bilaterally [Regular Rhythm] : with a regular rhythm [Normal S1, S2] : normal S1 and S2 [No Murmur] : no murmur heard [No Edema] : there was no peripheral edema [Soft] : abdomen soft [Non Tender] : non-tender [Non-distended] : non-distended [No Masses] : no abdominal mass palpated [No HSM] : no HSM [Normal Bowel Sounds] : normal bowel sounds [Normal] : the cranial nerves were intact [Sensation Tactile Decrease] : light touch was intact [2+] : left 2+ [Normal Affect] : the affect was normal [Normal Insight/Judgement] : insight and judgment were intact [de-identified] : (-) Kyree samaniego

## 2024-10-07 NOTE — HISTORY OF PRESENT ILLNESS
[FreeTextEntry1] : Pt returns today for afollow up appt .  Last week she called our office with reports of worsening migraine and feeling like hot liquid is dripping down her head when she bends over as well as dizziness. She did have oral surgery ( removal of right sided wisdom teeth ) the week before.  She has an appt with ENT MD later today.  Ms Jennings reports she is feeling better today , head pain is gone and dizziness is improved.

## 2024-10-07 NOTE — HISTORY OF PRESENT ILLNESS
[FreeTextEntry8] : cc: episodic dizziness x 4-5 days   episodic dizziness - occurs 4-5 x per day - "feels like losing balance" can occur with change of position  no n/v  no weakness  no URI   no cardiac symptoms   migraine headache worse because last week had wisdom teeth removed and partial wisdom removal last week  back on aimovig

## 2024-10-08 ENCOUNTER — APPOINTMENT (OUTPATIENT)
Dept: ORTHOPEDIC SURGERY | Facility: CLINIC | Age: 39
End: 2024-10-08
Payer: COMMERCIAL

## 2024-10-08 VITALS — HEIGHT: 64 IN | WEIGHT: 188 LBS | BODY MASS INDEX: 32.1 KG/M2

## 2024-10-08 DIAGNOSIS — Z87.898 PERSONAL HISTORY OF OTHER SPECIFIED CONDITIONS: ICD-10-CM

## 2024-10-08 DIAGNOSIS — M62.838 OTHER MUSCLE SPASM: ICD-10-CM

## 2024-10-08 DIAGNOSIS — M75.32 CALCIFIC TENDINITIS OF LEFT SHOULDER: ICD-10-CM

## 2024-10-08 PROCEDURE — J3490M: CUSTOM

## 2024-10-08 PROCEDURE — 20611 DRAIN/INJ JOINT/BURSA W/US: CPT | Mod: LT

## 2024-10-08 PROCEDURE — 76882 US LMTD JT/FCL EVL NVASC XTR: CPT | Mod: 59

## 2024-10-08 PROCEDURE — 99214 OFFICE O/P EST MOD 30 MIN: CPT | Mod: 25

## 2024-10-08 NOTE — PROCEDURE
[FreeTextEntry3] :  Large joint injection was performed of the left shoulder. An injection of Lidocaine 3cc of 1% , Bupivacaine (Marcaine) 6cc of 0.5% , Triamcinolone (Kenalog) 2cc of 40 mg was used. Patient was advised to call if redness, pain or fever occur and apply ice for 15 minutes out of every hour for the next 12-24 hours as tolerated.  Patient has tried OTC's including aspirin, Ibuprofen, Aleve, etc or prescription NSAIDS, and/or exercises at home and/or physical therapy without satisfactory response, patient had decreased mobility in the joint and the risks benefits, and alternatives have been discussed, and verbal consent was obtained.  The site was prepped with alcohol, betadine and ethyl chloride sprayed topically  The risks, benefits and contents of the injection have been discussed. Risks include but are not limited to allergic reaction, flare reaction, permanent white skin discoloration at the injection site and infection. The patient understands the risks and agrees to having the injection. All questions have been answered.  Ultrasound guidance was indicated for this patient due to prior failure or difficult injection. All ultrasound images have been permanently captured and stored accordingly in our picture archiving and communication system. Visualization of the needle and placement of injection was performed without complication. An ultrasound of the extremity was indicated due to evaluate tendon for tears, tendonitis, soft tissue mass, or ganglion cyst. The findings showed no evidence of ligament, tendon or muscle tear.

## 2024-10-08 NOTE — DISCUSSION/SUMMARY
[de-identified] : 40yo F with left calcific tendonitis and trapezial spasm with intermittent radiculopathy 1) CSI left shoulder today - tolerated well 2) start PT  3) rtc 6 weeks , consider further C spine workup if pain persists.

## 2024-10-08 NOTE — PHYSICAL EXAM
[Left] : left shoulder [Sitting] : sitting [Moderate] : moderate [] : no erythema [TWNoteComboBox7] : active forward flexion 120 degrees [de-identified] : active abduction 100 degrees [TWNoteComboBox6] : internal rotation L1

## 2024-10-08 NOTE — HISTORY OF PRESENT ILLNESS
[8] : 8 [Full time] : Work status: full time [de-identified] : 10/8/24: Here to f/up left shoulder calcific tendinitis. She took the MDP after last visit with minimal relief. Has not started PT yet.  9/10/24: 40yo female presenting with LT shoulder pain. No known injury/fall. Experiencing sharp/shooting pain for 2 weeks. No past treatment.  [] : no [FreeTextEntry1] : LT shoulder  [FreeTextEntry5] : Took MDP with no relief. Has not started PT due to work schedule.  [de-identified] : [employer]  Clear bilaterally, pupils equal, round and reactive to light.

## 2024-10-11 ENCOUNTER — APPOINTMENT (OUTPATIENT)
Dept: MRI IMAGING | Facility: CLINIC | Age: 39
End: 2024-10-11
Payer: COMMERCIAL

## 2024-10-11 ENCOUNTER — OUTPATIENT (OUTPATIENT)
Dept: OUTPATIENT SERVICES | Facility: HOSPITAL | Age: 39
LOS: 1 days | End: 2024-10-11
Payer: COMMERCIAL

## 2024-10-11 DIAGNOSIS — Z98.51 TUBAL LIGATION STATUS: Chronic | ICD-10-CM

## 2024-10-11 DIAGNOSIS — G43.001 MIGRAINE WITHOUT AURA, NOT INTRACTABLE, WITH STATUS MIGRAINOSUS: ICD-10-CM

## 2024-10-11 PROCEDURE — 70551 MRI BRAIN STEM W/O DYE: CPT | Mod: 26

## 2024-10-11 PROCEDURE — 70551 MRI BRAIN STEM W/O DYE: CPT

## 2024-10-14 ENCOUNTER — APPOINTMENT (OUTPATIENT)
Dept: OPHTHALMOLOGY | Facility: CLINIC | Age: 39
End: 2024-10-14
Payer: COMMERCIAL

## 2024-10-14 ENCOUNTER — NON-APPOINTMENT (OUTPATIENT)
Age: 39
End: 2024-10-14

## 2024-10-14 PROCEDURE — 99204 OFFICE O/P NEW MOD 45 MIN: CPT

## 2024-10-14 PROCEDURE — 92083 EXTENDED VISUAL FIELD XM: CPT

## 2024-10-14 PROCEDURE — 92133 CPTRZD OPH DX IMG PST SGM ON: CPT

## 2024-10-26 ENCOUNTER — APPOINTMENT (OUTPATIENT)
Dept: INTERNAL MEDICINE | Facility: CLINIC | Age: 39
End: 2024-10-26
Payer: COMMERCIAL

## 2024-10-26 VITALS
BODY MASS INDEX: 30.73 KG/M2 | DIASTOLIC BLOOD PRESSURE: 70 MMHG | SYSTOLIC BLOOD PRESSURE: 110 MMHG | WEIGHT: 180 LBS | HEIGHT: 64 IN

## 2024-10-26 DIAGNOSIS — E66.9 OBESITY, UNSPECIFIED: ICD-10-CM

## 2024-10-26 DIAGNOSIS — M79.672 PAIN IN LEFT FOOT: ICD-10-CM

## 2024-10-26 DIAGNOSIS — G43.909 MIGRAINE, UNSPECIFIED, NOT INTRACTABLE, W/OUT STATUS MIGRAINOSUS: ICD-10-CM

## 2024-10-26 DIAGNOSIS — K21.9 GASTRO-ESOPHAGEAL REFLUX DISEASE W/OUT ESOPHAGITIS: ICD-10-CM

## 2024-10-26 PROCEDURE — 99214 OFFICE O/P EST MOD 30 MIN: CPT

## 2024-10-26 PROCEDURE — 36415 COLL VENOUS BLD VENIPUNCTURE: CPT

## 2024-10-26 PROCEDURE — G2211 COMPLEX E/M VISIT ADD ON: CPT

## 2024-10-26 RX ORDER — DICLOFENAC SODIUM 3 G/100G
3 GEL TOPICAL TWICE DAILY
Refills: 0 | Status: ACTIVE | COMMUNITY
Start: 2024-10-26

## 2024-10-26 NOTE — HISTORY OF PRESENT ILLNESS
[FreeTextEntry1] :  f/u for obesity  [de-identified] : in interval - had  MRI brain for persistent migraine headache - on diamox, ubrelvy, hailey will have neurology f/u   scheduled for colonoscopy NOV 5 - has stopped zepbound - having colonoscopy done w/Dr. Herman Butler   Obesity - doing well on zepbound 7.5mg weekly - continues to lose weight  had flu shot already at work

## 2024-10-26 NOTE — PHYSICAL EXAM
[No Acute Distress] : no acute distress [Well Nourished] : well nourished [No Accessory Muscle Use] : no accessory muscle use [Clear to Auscultation] : lungs were clear to auscultation bilaterally [Regular Rhythm] : with a regular rhythm [Normal S1, S2] : normal S1 and S2 [No Murmur] : no murmur heard [No Edema] : there was no peripheral edema [Soft] : abdomen soft [Non Tender] : non-tender [Non-distended] : non-distended [No Masses] : no abdominal mass palpated [No HSM] : no HSM [Normal Bowel Sounds] : normal bowel sounds [Normal Affect] : the affect was normal [Normal Insight/Judgement] : insight and judgment were intact [de-identified] : TTP to lateral aspect with examiner pushing up L. foot

## 2024-10-30 ENCOUNTER — TRANSCRIPTION ENCOUNTER (OUTPATIENT)
Age: 39
End: 2024-10-30

## 2024-10-30 LAB
ALBUMIN SERPL ELPH-MCNC: 4.5 G/DL
ALP BLD-CCNC: 90 U/L
ALT SERPL-CCNC: 16 U/L
ANION GAP SERPL CALC-SCNC: 12 MMOL/L
AST SERPL-CCNC: 16 U/L
BASOPHILS # BLD AUTO: 0.04 K/UL
BASOPHILS NFR BLD AUTO: 0.4 %
BILIRUB SERPL-MCNC: 0.2 MG/DL
BUN SERPL-MCNC: 15 MG/DL
CALCIUM SERPL-MCNC: 9.5 MG/DL
CHLORIDE SERPL-SCNC: 109 MMOL/L
CHOLEST SERPL-MCNC: 191 MG/DL
CO2 SERPL-SCNC: 20 MMOL/L
CREAT SERPL-MCNC: 0.8 MG/DL
EGFR: 96 ML/MIN/1.73M2
EOSINOPHIL # BLD AUTO: 0.13 K/UL
EOSINOPHIL NFR BLD AUTO: 1.2 %
ESTIMATED AVERAGE GLUCOSE: 97 MG/DL
GLUCOSE SERPL-MCNC: 77 MG/DL
HBA1C MFR BLD HPLC: 5 %
HCT VFR BLD CALC: 43.6 %
HDLC SERPL-MCNC: 55 MG/DL
HGB BLD-MCNC: 14.2 G/DL
IMM GRANULOCYTES NFR BLD AUTO: 0.3 %
LDLC SERPL CALC-MCNC: 123 MG/DL
LYMPHOCYTES # BLD AUTO: 2.41 K/UL
LYMPHOCYTES NFR BLD AUTO: 22.7 %
MAN DIFF?: NORMAL
MCHC RBC-ENTMCNC: 27.2 PG
MCHC RBC-ENTMCNC: 32.6 GM/DL
MCV RBC AUTO: 83.5 FL
MONOCYTES # BLD AUTO: 0.8 K/UL
MONOCYTES NFR BLD AUTO: 7.5 %
NEUTROPHILS # BLD AUTO: 7.23 K/UL
NEUTROPHILS NFR BLD AUTO: 67.9 %
NONHDLC SERPL-MCNC: 135 MG/DL
PLATELET # BLD AUTO: 348 K/UL
POTASSIUM SERPL-SCNC: 4 MMOL/L
PROT SERPL-MCNC: 7.5 G/DL
RBC # BLD: 5.22 M/UL
RBC # FLD: 16.2 %
SODIUM SERPL-SCNC: 140 MMOL/L
T4 FREE SERPL-MCNC: 1.4 NG/DL
TRIGL SERPL-MCNC: 67 MG/DL
TSH SERPL-ACNC: 2.46 UIU/ML
WBC # FLD AUTO: 10.64 K/UL

## 2024-11-09 RX ORDER — TIRZEPATIDE 10 MG/.5ML
10 INJECTION, SOLUTION SUBCUTANEOUS WEEKLY
Qty: 1 | Refills: 1 | Status: ACTIVE | COMMUNITY
Start: 2024-11-09 | End: 1900-01-01

## 2024-11-11 ENCOUNTER — LABORATORY RESULT (OUTPATIENT)
Age: 39
End: 2024-11-11

## 2024-11-11 ENCOUNTER — APPOINTMENT (OUTPATIENT)
Dept: PSYCHIATRY | Facility: CLINIC | Age: 39
End: 2024-11-11
Payer: COMMERCIAL

## 2024-11-11 DIAGNOSIS — Z82.61 FAMILY HISTORY OF ARTHRITIS: ICD-10-CM

## 2024-11-11 DIAGNOSIS — M54.2 CERVICALGIA: ICD-10-CM

## 2024-11-11 PROCEDURE — 99214 OFFICE O/P EST MOD 30 MIN: CPT | Mod: 95

## 2024-11-11 RX ORDER — VENLAFAXINE HYDROCHLORIDE 37.5 MG/1
37.5 CAPSULE, EXTENDED RELEASE ORAL DAILY
Qty: 5 | Refills: 0 | Status: ACTIVE | COMMUNITY
Start: 2024-11-11 | End: 1900-01-01

## 2024-11-11 RX ORDER — VENLAFAXINE HYDROCHLORIDE 75 MG/1
75 CAPSULE, EXTENDED RELEASE ORAL
Qty: 30 | Refills: 1 | Status: ACTIVE | COMMUNITY
Start: 2024-11-11 | End: 1900-01-01

## 2024-11-11 NOTE — H&P PST ADULT - AS BP NONINV METHOD
Education Record    Learner:  Patient    Disease / Diagnosis:Anemia of chronic disease      Barriers / Limitations:  None   Comments:    Method:  Brief focused   Comments:    General Topics:  Diet, Infection, Medication, Pain, Precautions, Procedure, Side effects and symptom management, Plan of care reviewed, and Fall risk and prevention   Comments:    Outcome:  Shows understanding   Comments:    Hg=9.3. Given higher dose of Retacrit today 40 000 units. Given new AVS as well   electronic

## 2024-11-11 NOTE — HISTORY OF PRESENT ILLNESS
[de-identified] : The following service was provided using telehealth between writer/provider and patient. The patient was at home. The writer was at clinic. Patient was alone and consented to telehealth format. All treatment plans through and including today's date were reviewed with the patient.  Patient is here for 2 month follow-up visit via telehealth.  No medication changes at that time.   Mood: anxious. Has been using more of the Klonopin.  Sleepin.5-7 hours with Trazodone and Vistaril. Less sweating with the Cogentin. Takes it PRN.  Appetite is better. Binges less.  Energy is 50/50, concentration and motivation are all improved with the Vyvanse. Denies any AVH, SI or HI +Drug holiday [Home] : at home, [unfilled] , at the time of the visit. [Medical Office: ([facility])___] : at the medical office located in  [Verbal consent obtained from patient] : the patient, [unfilled]

## 2024-11-11 NOTE — DISCUSSION/SUMMARY
[FreeTextEntry1] : Assessment: Patient is a 37 yo  female with h/o depression and anxiety seen today for medication management. . Patient is compliant with medications and tolerating without any new reported side effects. I-stop reviewed and no issues noticed.  I-STOP: Patient Name: Corrie Jennings  YOB: 1985  Address: [address] [place] [address] AVE [facility] [place] [address]  Sex: Female  Rx Written	Rx Dispensed	Drug	Quantity	Days Supply	Prescriber Name	Prescriber Bekah #	Payment Method	Dispenser 12/20/2021	12/22/2021	clonazepam 0.5 mg tablet 	60	30	Ozzie Ackerman MD	[id number]	Insurance	[id number] 09/23/2021	12/04/2021	zolpidem tartrate 10 mg tablet 	30	30	Ozzie Ackerman MD	[id number]	Insurance	[id number] 11/20/2021	11/20/2021	pregabalin 50 mg capsule 	60	30	Ozzie Ackerman MD	[id number]	Insurance	[id number] 09/23/2021	10/26/2021	zolpidem tartrate 10 mg tablet 	30	30	Ozzie Ackerman MD	[id number]	Insurance	[id number] 10/25/2021	10/26/2021	clonazepam 0.5 mg tablet 	60	30	Ozzie Ackerman MD	[id number]	Insurance	[id number] 10/08/2021	10/14/2021	pregabalin 50 mg capsule 	60	30	Shanice Escamilla	[id number]	Insurance	[id number] 09/23/2021	09/25/2021	clonazepam 0.5 mg tablet 	60	30	Ozzie Ackerman MD	[id number]	Insurance	[id number] 09/23/2021	09/25/2021	zolpidem tartrate 10 mg tablet 	30	30	Ozzie Ackerman MD	[id number]	Insurance	[id number] 09/13/2021	09/14/2021	pregabalin 50 mg capsule 	60	30	Shanice Escamilla	[id number]	Insurance	[id number] 08/14/2021	08/18/2021	clonazepam 0.5 mg tablet 	60	30	Ozzie Ackerman MD	[id number]	Insurance	[id number] 06/09/2021	08/18/2021	zolpidem tartrate 10 mg tablet 	30	30	Ozzie Ackerman MD	[id number]	Insurance	[id number] 06/09/2021	07/18/2021	zolpidem tartrate 10 mg tablet 	30	30	Ozzie Ackerman MD	[id number]	Insurance	[id number] 07/14/2021	07/18/2021	clonazepam 0.5 mg tablet 	60	30	Ozzie Ackerman MD	[id number]	Insurance	[id number] 07/09/2021	07/09/2021	hydrocodone-acetaminophen 5-325 mg tablet 	12	2	Gideon Moreno	[id number]	Insurance	[id number] 06/21/2021	06/21/2021	hydrocodone-acetaminophen 5-325 mg tablet 	18	3	Gideon Moreno	[id number]	Insurance	[id number] 06/09/2021	06/14/2021	zolpidem tartrate 10 mg tablet 	30	30	Ozize Ackerman MD	[id number]	Insurance	[id number]  Patient Name: Corrie Jennings  YOB: 1985  Address: [address] [place] [address]  Sex: Female  Rx Written	Rx Dispensed	Drug	Quantity	Days Supply	Prescriber Name	Prescriber Bekah #	Payment Method	Dispenser 08/18/2021	08/19/2021	pregabalin 25 mg capsule 	60	30	Shanice Escamilla	[id number]	Insurance	[facility] [id number] 06/02/2021	06/04/2021	clonazepam 0.5 mg tablet 	60	30	Elizabeth Epstein (DO)	[id number]	Insurance	[id number] 03/04/2021	05/14/2021	zolpidem tartrate 10 mg tablet 	30	30	Ozzie Ackerman MD	[id number]	Insurance	[id number] 03/04/2021	04/09/2021	zolpidem tartrate 10 mg tablet 	30	30	Ozzie Ackerman MD	[id number]	Insurance	[id number] 03/27/2021	03/30/2021	clonazepam 0.5 mg tablet 	60	30	Ozzie Ackerman MD	[id number]	Insurance	[id number] 03/04/2021	03/11/2021	zolpidem tartrate 10 mg tablet 	30	30	Ozzie Ackerman MD	[id number]	Insurance	[id number] 11/30/2020	02/05/2021	zolpidem tartrate 10 mg tablet 	30	30	Ozzie Ackerman MD	[id number]	Insurance	[id number] 02/05/2021	02/05/2021	clonazepam 0.5 mg tablet 	60	30	Ozzie Ackerman MD	[id number]	Insurance	[id number] 11/30/2020	12/31/2020	zolpidem tartrate 10 mg tablet 	30	30	Ozzie Ackerman MD	[id number]	Insurance	[id number] 12/23/2020	12/31/2020	alprazolam 0.5 mg tablet 	90	30	Ozzie Ackerman MD	[id number]	Insurance	[id number]    PLAN: Start Venlafaxine 37.5 mg x 5 days and then increase it to 75 mg PO QD for anxiety and depression.  Continue Vistaril 25 mg PO TID PRN for insomnia Continue Vyvanse 50 mg PO QD for ADHD and binge eating disorder Continue Cogentin 0.1 mg PO QHS for sweating.  Continue Trazodone 200 mg PO QHS for insomnia  Continue Klonopin 0.5 mg PO BID PRN for anxiety.  D/C Zoloft 50 mg PO QD for depression and anxiety D/C Wellbutrin  PO QAM for depression, low motivation, energy and concentration D/C Lexapro 15 mg PO QD for depression and anxiety D/C Abilify 7 mg PO QHS for depression augmentation D/C Cymbalta 60 mg PO QHS for depression, anxiety, and neuropathic pain D/C Adderall 5 mg PO QD for ADHD - Discussed risks and benefits of medications including side effects of GI and sexual side effects with SSRI. Alternative strategies including no intervention discussed with patient. Patient consents to current medications as prescribed. - Patient understands to contact clinic prn with concerns and agrees to call 911 or go to nearest ER if symptoms worsen. - Next appointment made in 2 month. Patient was not in any distress.

## 2024-11-18 DIAGNOSIS — G43.909 MIGRAINE, UNSPECIFIED, NOT INTRACTABLE, W/OUT STATUS MIGRAINOSUS: ICD-10-CM

## 2024-11-21 LAB
CCP AB SER IA-ACNC: <8 U/ML
CRP SERPL-MCNC: 4 MG/L
DSDNA AB SER-ACNC: <1 IU/ML
ENA RNP AB SER IA-ACNC: <0.2 AL
ENA SCL70 IGG SER IA-ACNC: <0.2 AL
ENA SM AB SER IA-ACNC: <0.2 AL
ENA SS-A AB SER IA-ACNC: <0.2 AL
ENA SS-B AB SER IA-ACNC: <0.2 AL
ERYTHROCYTE [SEDIMENTATION RATE] IN BLOOD BY WESTERGREN METHOD: 13 MM/HR
RF+CCP IGG SER-IMP: NEGATIVE
RHEUMATOID FACT SER QL: 19 IU/ML
RIBOSOMAL P AB SER IA-ACNC: <0.2 AL

## 2024-11-22 LAB — ANA SER IF-ACNC: NEGATIVE

## 2024-11-25 DIAGNOSIS — R76.8 OTHER SPECIFIED ABNORMAL IMMUNOLOGICAL FINDINGS IN SERUM: ICD-10-CM

## 2024-11-25 LAB
ESTRADIOL SERPL-MCNC: 49 PG/ML
FSH SERPL-MCNC: 9.6 IU/L
LH SERPL-ACNC: 6 IU/L
T3 SERPL-MCNC: 90 NG/DL
T4 FREE SERPL-MCNC: 1.3 NG/DL
TSH SERPL-ACNC: 1.06 UIU/ML

## 2024-11-26 ENCOUNTER — APPOINTMENT (OUTPATIENT)
Dept: ORTHOPEDIC SURGERY | Facility: CLINIC | Age: 39
End: 2024-11-26
Payer: COMMERCIAL

## 2024-11-26 VITALS — WEIGHT: 178 LBS | HEIGHT: 64 IN | BODY MASS INDEX: 30.39 KG/M2

## 2024-11-26 DIAGNOSIS — M62.838 OTHER MUSCLE SPASM: ICD-10-CM

## 2024-11-26 DIAGNOSIS — M75.32 CALCIFIC TENDINITIS OF LEFT SHOULDER: ICD-10-CM

## 2024-11-26 PROCEDURE — 99213 OFFICE O/P EST LOW 20 MIN: CPT

## 2024-11-27 NOTE — DISCUSSION/SUMMARY
[de-identified] : 38yo F with left calcific tendonitis and trapezial spasm with more persistent radiculopathy 1) MRI C-spine ordered due to radicular symptoms  2) continue PT  3) follow-up with pain management to review C-spine MRI

## 2024-11-27 NOTE — HISTORY OF PRESENT ILLNESS
[de-identified] : 11/26/24: [facility] for f/u L shoulder. CSI last visit with relief for 2 days. Reports N/T from LT shoulder to LT elbow. Has done 2 PT sessions ([facility]), doing HEP.   10/8/24: Here to f/up left shoulder calcific tendinitis. She took the MDP after last visit with minimal relief. Has not started PT yet.  9/10/24: 40yo female presenting with LT shoulder pain. No known injury/fall. Experiencing sharp/shooting pain for 2 weeks. No past treatment.  [] : no [FreeTextEntry1] : LT shoulder  [de-identified] : [employer]

## 2024-11-27 NOTE — PHYSICAL EXAM
[] : no erythema [TWNoteComboBox7] : active forward flexion 120 degrees [de-identified] : active abduction 100 degrees [TWNoteComboBox6] : internal rotation L1

## 2024-11-29 ENCOUNTER — APPOINTMENT (OUTPATIENT)
Dept: MRI IMAGING | Facility: CLINIC | Age: 39
End: 2024-11-29
Payer: COMMERCIAL

## 2024-11-29 PROCEDURE — 72141 MRI NECK SPINE W/O DYE: CPT

## 2024-12-17 ENCOUNTER — APPOINTMENT (OUTPATIENT)
Dept: ORTHOPEDIC SURGERY | Facility: CLINIC | Age: 39
End: 2024-12-17

## 2025-01-13 ENCOUNTER — APPOINTMENT (OUTPATIENT)
Dept: PSYCHIATRY | Facility: CLINIC | Age: 40
End: 2025-01-13
Payer: COMMERCIAL

## 2025-01-13 PROCEDURE — 99214 OFFICE O/P EST MOD 30 MIN: CPT | Mod: 95

## 2025-01-13 NOTE — HISTORY OF PRESENT ILLNESS
[de-identified] : The following service was provided using telehealth between writer/provider and patient. The patient was at home. The writer was at clinic. Patient was alone and consented to telehealth format. All treatment plans through and including today's date were reviewed with the patient.  Patient is here for 3 month follow-up visit via telehealth.  At that time Venlafaxine 75 mg was started on the patient. States she likes it. Less panic attacks. No Klonopin use. Less anxiety and dperession.   Mood: Less panic. Less anxiety and depression.  Sleepin-8 hours with Trazodone and Vistaril. +Has hot flashes but is using the Cogentin. Takes it PRN.  Appetite is better. Binges less on the Vyvanse 50 mg.  Energy, concentration and motivation are all improved with the Vyvanse. Denies any AVH, SI or HI +Drug holiday [Home] : at home, [unfilled] , at the time of the visit. [Medical Office: ([facility])___] : at the medical office located in  [Verbal consent obtained from patient] : the patient, [unfilled]

## 2025-01-13 NOTE — DISCUSSION/SUMMARY
[FreeTextEntry1] : Assessment: Patient is a 37 yo  female with h/o depression and anxiety seen today for medication management. . Patient is compliant with medications and tolerating without any new reported side effects. I-stop reviewed and no issues noticed.  I-STOP: Patient Name: Corrie Jennings  YOB: 1985  Address: [address] [place] [address] AVE [facility] [place] [address]  Sex: Female  Rx Written	Rx Dispensed	Drug	Quantity	Days Supply	Prescriber Name	Prescriber Bekah #	Payment Method	Dispenser 12/20/2021	12/22/2021	clonazepam 0.5 mg tablet 	60	30	Ozzie Ackeramn MD	[id number]	Insurance	[id number] 09/23/2021	12/04/2021	zolpidem tartrate 10 mg tablet 	30	30	Ozzie Ackerman MD	[id number]	Insurance	[id number] 11/20/2021	11/20/2021	pregabalin 50 mg capsule 	60	30	Ozzie Ackerman MD	[id number]	Insurance	[id number] 09/23/2021	10/26/2021	zolpidem tartrate 10 mg tablet 	30	30	Ozzie Ackerman MD	[id number]	Insurance	[id number] 10/25/2021	10/26/2021	clonazepam 0.5 mg tablet 	60	30	Ozzie Ackerman MD	[id number]	Insurance	[id number] 10/08/2021	10/14/2021	pregabalin 50 mg capsule 	60	30	Shanice Escamilla	[id number]	Insurance	[id number] 09/23/2021	09/25/2021	clonazepam 0.5 mg tablet 	60	30	Ozzie Ackerman MD	[id number]	Insurance	[id number] 09/23/2021	09/25/2021	zolpidem tartrate 10 mg tablet 	30	30	Ozzie Ackerman MD	[id number]	Insurance	[id number] 09/13/2021	09/14/2021	pregabalin 50 mg capsule 	60	30	Shanice Escamilla	[id number]	Insurance	[id number] 08/14/2021	08/18/2021	clonazepam 0.5 mg tablet 	60	30	Ozzie Ackerman MD	[id number]	Insurance	[id number] 06/09/2021	08/18/2021	zolpidem tartrate 10 mg tablet 	30	30	Ozzie Ackerman MD	[id number]	Insurance	[id number] 06/09/2021	07/18/2021	zolpidem tartrate 10 mg tablet 	30	30	Ozzie Ackerman MD	[id number]	Insurance	[id number] 07/14/2021	07/18/2021	clonazepam 0.5 mg tablet 	60	30	Ozzie Ackerman MD	[id number]	Insurance	[id number] 07/09/2021	07/09/2021	hydrocodone-acetaminophen 5-325 mg tablet 	12	2	iGdeon Moreno	[id number]	Insurance	[id number] 06/21/2021	06/21/2021	hydrocodone-acetaminophen 5-325 mg tablet 	18	3	Gideon Moreno	[id number]	Insurance	[id number] 06/09/2021	06/14/2021	zolpidem tartrate 10 mg tablet 	30	30	Ozzie Ackerman MD	[id number]	Insurance	[id number]  Patient Name: Corrie Jennings  YOB: 1985  Address: [address] [place] [address]  Sex: Female  Rx Written	Rx Dispensed	Drug	Quantity	Days Supply	Prescriber Name	Prescriber Bekah #	Payment Method	Dispenser 08/18/2021	08/19/2021	pregabalin 25 mg capsule 	60	30	Shanice Escamilla	[id number]	Insurance	[facility] [id number] 06/02/2021	06/04/2021	clonazepam 0.5 mg tablet 	60	30	Elizabeth Epstein (DO)	[id number]	Insurance	[id number] 03/04/2021	05/14/2021	zolpidem tartrate 10 mg tablet 	30	30	Ozzie Ackerman MD	[id number]	Insurance	[id number] 03/04/2021	04/09/2021	zolpidem tartrate 10 mg tablet 	30	30	Ozzie Ackerman MD	[id number]	Insurance	[id number] 03/27/2021	03/30/2021	clonazepam 0.5 mg tablet 	60	30	Ozzie Ackerman MD	[id number]	Insurance	[id number] 03/04/2021	03/11/2021	zolpidem tartrate 10 mg tablet 	30	30	Ozzie Ackerman MD	[id number]	Insurance	[id number] 11/30/2020	02/05/2021	zolpidem tartrate 10 mg tablet 	30	30	Ozzie Ackerman MD	[id number]	Insurance	[id number] 02/05/2021	02/05/2021	clonazepam 0.5 mg tablet 	60	30	Ozzie Ackerman MD	[id number]	Insurance	[id number] 11/30/2020	12/31/2020	zolpidem tartrate 10 mg tablet 	30	30	Ozzie Ackerman MD	[id number]	Insurance	[id number] 12/23/2020	12/31/2020	alprazolam 0.5 mg tablet 	90	30	Ozzie Ackerman MD	[id number]	Insurance	[id number]    PLAN: Continue Venlafaxine 75 mg PO QD for anxiety and depression.  Continue Vistaril 25 mg PO TID PRN for insomnia Continue Vyvanse 50 mg PO QD for ADHD and binge eating disorder Continue Cogentin 0.1 mg PO QHS for sweating.  Continue Trazodone 200 mg PO QHS for insomnia  Continue Klonopin 0.5 mg PO BID PRN for anxiety.  D/C Zoloft 50 mg PO QD for depression and anxiety D/C Wellbutrin  PO QAM for depression, low motivation, energy and concentration D/C Lexapro 15 mg PO QD for depression and anxiety D/C Abilify 7 mg PO QHS for depression augmentation D/C Cymbalta 60 mg PO QHS for depression, anxiety, and neuropathic pain D/C Adderall 5 mg PO QD for ADHD - Discussed risks and benefits of medications including side effects of GI and sexual side effects with SSRI. Alternative strategies including no intervention discussed with patient. Patient consents to current medications as prescribed. - Patient understands to contact clinic prn with concerns and agrees to call 911 or go to nearest ER if symptoms worsen. - Next appointment made in 3 month. Patient was not in any distress.

## 2025-01-13 NOTE — DISCUSSION/SUMMARY
[FreeTextEntry1] : Assessment: Patient is a 37 yo  female with h/o depression and anxiety seen today for medication management. . Patient is compliant with medications and tolerating without any new reported side effects. I-stop reviewed and no issues noticed.  I-STOP: Patient Name: Corrie Jennings  YOB: 1985  Address: [address] [place] [address] [place] [facility] [place] [address]  Sex: Female  Rx Written	Rx Dispensed	Drug	Quantity	Days Supply	Prescriber Name	Prescriber Bekah #	Payment Method	Dispenser 12/20/2021	12/22/2021	clonazepam 0.5 mg tablet 	60	30	Ozzie Ackerman MD	[id number]	Insurance	[id number] 09/23/2021	12/04/2021	zolpidem tartrate 10 mg tablet 	30	30	Ozzie Ackerman MD	[id number]	Insurance	[id number] 11/20/2021	11/20/2021	pregabalin 50 mg capsule 	60	30	Ozzie Ackerman MD	[id number]	Insurance	[id number] 09/23/2021	10/26/2021	zolpidem tartrate 10 mg tablet 	30	30	Ozzie Ackerman MD	[id number]	Insurance	[id number] 10/25/2021	10/26/2021	clonazepam 0.5 mg tablet 	60	30	Ozzie Ackerman MD	[id number]	Insurance	[id number] 10/08/2021	10/14/2021	pregabalin 50 mg capsule 	60	30	Shanice Escamilla	[id number]	Insurance	[id number] 09/23/2021	09/25/2021	clonazepam 0.5 mg tablet 	60	30	Ozzie Ackerman MD	[id number]	Insurance	[id number] 09/23/2021	09/25/2021	zolpidem tartrate 10 mg tablet 	30	30	Ozzie Ackerman MD	[id number]	Insurance	[id number] 09/13/2021	09/14/2021	pregabalin 50 mg capsule 	60	30	Shanice Escamilla	[id number]	Insurance	[id number] 08/14/2021	08/18/2021	clonazepam 0.5 mg tablet 	60	30	Ozzie Ackerman MD	[id number]	Insurance	[id number] 06/09/2021	08/18/2021	zolpidem tartrate 10 mg tablet 	30	30	Ozzie Ackerman MD	[id number]	Insurance	[id number] 06/09/2021	07/18/2021	zolpidem tartrate 10 mg tablet 	30	30	Ozzie Ackerman MD	[id number]	Insurance	[id number] 07/14/2021	07/18/2021	clonazepam 0.5 mg tablet 	60	30	Ozzie Ackerman MD	[id number]	Insurance	[id number] 07/09/2021	07/09/2021	hydrocodone-acetaminophen 5-325 mg tablet 	12	2	Gideon Moreno	[id number]	Insurance	[id number] 06/21/2021	06/21/2021	hydrocodone-acetaminophen 5-325 mg tablet 	18	3	Gideon Moreno	[id number]	Insurance	[id number] 06/09/2021	06/14/2021	zolpidem tartrate 10 mg tablet 	30	30	Ozzie Ackerman MD	[id number]	Insurance	[id number]  Patient Name: Corrie Jnenings  YOB: 1985  Address: [address] [place] [address]  Sex: Female  Rx Written	Rx Dispensed	Drug	Quantity	Days Supply	Prescriber Name	Prescriber Bekah #	Payment Method	Dispenser 08/18/2021	08/19/2021	pregabalin 25 mg capsule 	60	30	Shanice Escamilla	[id number]	Insurance	[facility] [id number] 06/02/2021	06/04/2021	clonazepam 0.5 mg tablet 	60	30	Elizabeth Epstein (DO)	[id number]	Insurance	[id number] 03/04/2021	05/14/2021	zolpidem tartrate 10 mg tablet 	30	30	Ozzie Ackerman MD	[id number]	Insurance	[id number] 03/04/2021	04/09/2021	zolpidem tartrate 10 mg tablet 	30	30	Ozzie Ackerman MD	[id number]	Insurance	[id number] 03/27/2021	03/30/2021	clonazepam 0.5 mg tablet 	60	30	Ozzie Ackerman MD	[id number]	Insurance	[id number] 03/04/2021	03/11/2021	zolpidem tartrate 10 mg tablet 	30	30	Ozzie Ackerman MD	[id number]	Insurance	[id number] 11/30/2020	02/05/2021	zolpidem tartrate 10 mg tablet 	30	30	Ozzie Ackerman MD	[id number]	Insurance	[id number] 02/05/2021	02/05/2021	clonazepam 0.5 mg tablet 	60	30	Ozzie Ackerman MD	[id number]	Insurance	[id number] 11/30/2020	12/31/2020	zolpidem tartrate 10 mg tablet 	30	30	Ozzie Ackerman MD	[id number]	Insurance	[id number] 12/23/2020	12/31/2020	alprazolam 0.5 mg tablet 	90	30	Ozzie Ackerman MD	[id number]	Insurance	[id number]    PLAN: Continue Venlafaxine 75 mg PO QD for anxiety and depression.  Continue Vistaril 25 mg PO TID PRN for insomnia Continue Vyvanse 50 mg PO QD for ADHD and binge eating disorder Continue Cogentin 0.1 mg PO QHS for sweating.  Continue Trazodone 200 mg PO QHS for insomnia  Continue Klonopin 0.5 mg PO BID PRN for anxiety.  D/C Zoloft 50 mg PO QD for depression and anxiety D/C Wellbutrin  PO QAM for depression, low motivation, energy and concentration D/C Lexapro 15 mg PO QD for depression and anxiety D/C Abilify 7 mg PO QHS for depression augmentation D/C Cymbalta 60 mg PO QHS for depression, anxiety, and neuropathic pain D/C Adderall 5 mg PO QD for ADHD - Discussed risks and benefits of medications including side effects of GI and sexual side effects with SSRI. Alternative strategies including no intervention discussed with patient. Patient consents to current medications as prescribed. - Patient understands to contact clinic prn with concerns and agrees to call 911 or go to nearest ER if symptoms worsen. - Next appointment made in 3 month. Patient was not in any distress.   [Right] : right knee [4___] : hamstring 4[unfilled]/5 [5___] : hamstring 5[unfilled]/5 [Flexion] : flexion [Extension] : extension [Bending to left] : bending to left [Bending to right] : bending to right [Left] : left hip [FreeTextEntry9] : PAIN WITH RESISTED STRAIGHT LEG RAISE RIGHT .  [] : no tenderness [TWNoteComboBox7] : flexion 115 degrees [de-identified] : extension 0 degrees

## 2025-01-13 NOTE — HISTORY OF PRESENT ILLNESS
[de-identified] : The following service was provided using telehealth between writer/provider and patient. The patient was at home. The writer was at clinic. Patient was alone and consented to telehealth format. All treatment plans through and including today's date were reviewed with the patient.  Patient is here for 3 month follow-up visit via telehealth.  At that time Venlafaxine 75 mg was started on the patient. States she likes it. Less panic attacks. No Klonopin use. Less anxiety and dperession.   Mood: Less panic. Less anxiety and depression.  Sleepin-8 hours with Trazodone and Vistaril. +Has hot flashes but is using the Cogentin. Takes it PRN.  Appetite is better. Binges less on the Vyvanse 50 mg.  Energy, concentration and motivation are all improved with the Vyvanse. Denies any AVH, SI or HI +Drug holiday [Home] : at home, [unfilled] , at the time of the visit. [Medical Office: ([facility])___] : at the medical office located in  [Verbal consent obtained from patient] : the patient, [unfilled]

## 2025-01-16 ENCOUNTER — APPOINTMENT (OUTPATIENT)
Dept: PAIN MANAGEMENT | Facility: CLINIC | Age: 40
End: 2025-01-16

## 2025-01-21 NOTE — REASON FOR VISIT
[Follow-Up Visit] : a follow-up pain management visit [Initial Consultation] : an initial pain management consultation

## 2025-01-23 ENCOUNTER — APPOINTMENT (OUTPATIENT)
Dept: PAIN MANAGEMENT | Facility: CLINIC | Age: 40
End: 2025-01-23

## 2025-01-27 NOTE — ED CDU PROVIDER INITIAL DAY NOTE - CONSTITUTIONAL, MLM
Lactation rounds attempted, mother at infant's bedside.    normal... Well appearing, well nourished, awake, alert, oriented to person, place, time/situation and in no apparent distress.

## 2025-02-11 ENCOUNTER — APPOINTMENT (OUTPATIENT)
Dept: INTERNAL MEDICINE | Facility: CLINIC | Age: 40
End: 2025-02-11
Payer: COMMERCIAL

## 2025-02-11 VITALS — RESPIRATION RATE: 14 BRPM | HEART RATE: 80 BPM | SYSTOLIC BLOOD PRESSURE: 120 MMHG | DIASTOLIC BLOOD PRESSURE: 80 MMHG

## 2025-02-11 DIAGNOSIS — J06.9 ACUTE UPPER RESPIRATORY INFECTION, UNSPECIFIED: ICD-10-CM

## 2025-02-11 DIAGNOSIS — Z11.59 ENCOUNTER FOR SCREENING FOR OTHER VIRAL DISEASES: ICD-10-CM

## 2025-02-11 DIAGNOSIS — J01.90 ACUTE SINUSITIS, UNSPECIFIED: ICD-10-CM

## 2025-02-11 PROCEDURE — 99213 OFFICE O/P EST LOW 20 MIN: CPT

## 2025-02-11 RX ORDER — CEFUROXIME AXETIL 250 MG/1
250 TABLET ORAL
Qty: 14 | Refills: 0 | Status: COMPLETED | COMMUNITY
Start: 2025-02-11 | End: 2025-02-18

## 2025-02-12 DIAGNOSIS — J11.1 INFLUENZA DUE TO UNIDENTIFIED INFLUENZA VIRUS WITH OTHER RESPIRATORY MANIFESTATIONS: ICD-10-CM

## 2025-02-12 LAB
FLUAV RNA NPH QL NAA+NON-PROBE: DETECTED
RESP PATH DNA+RNA PNL NPH NAA+NON-PROBE: DETECTED
SARS-COV-2 RNA RESP QL NAA+PROBE: NOT DETECTED

## 2025-02-12 RX ORDER — OSELTAMIVIR PHOSPHATE 75 MG/1
75 CAPSULE ORAL
Qty: 1 | Refills: 0 | Status: ACTIVE | COMMUNITY
Start: 2025-02-12 | End: 1900-01-01

## 2025-02-19 VITALS — HEIGHT: 64 IN | WEIGHT: 174 LBS | BODY MASS INDEX: 29.71 KG/M2

## 2025-03-12 ENCOUNTER — APPOINTMENT (OUTPATIENT)
Dept: PSYCHIATRY | Facility: CLINIC | Age: 40
End: 2025-03-12
Payer: COMMERCIAL

## 2025-03-12 PROCEDURE — 99214 OFFICE O/P EST MOD 30 MIN: CPT | Mod: 95

## 2025-03-12 NOTE — DISCUSSION/SUMMARY
[FreeTextEntry1] : Assessment: Patient is a 37 yo  female with h/o depression and anxiety seen today for medication management. . Patient is compliant with medications and tolerating without any new reported side effects. I-stop reviewed and no issues noticed.  I-STOP: Patient Name: Corrie Jennings  YOB: 1985  Address: [address] [place] [address] [address]VE [facility] [place] [address]  Sex: Female  Rx Written	Rx Dispensed	Drug	Quantity	Days Supply	Prescriber Name	Prescriber Bekah #	Payment Method	Dispenser 12/20/2021	12/22/2021	clonazepam 0.5 mg tablet 	60	30	Ozzie Ackerman MD	[id number]	Insurance	[id number] 09/23/2021	12/04/2021	zolpidem tartrate 10 mg tablet 	30	30	Ozzie Ackerman MD	[id number]	Insurance	[id number] 11/20/2021	11/20/2021	pregabalin 50 mg capsule 	60	30	Ozzie Ackerman MD	[id number]	Insurance	[facility] 09/23/2021	10/26/2021	zolpidem tartrate 10 mg tablet 	30	30	Ozzie Ackerman MD	[id number]	Insurance	[facility] 10/25/2021	10/26/2021	clonazepam 0.5 mg tablet 	60	30	Ozzie Ackerman MD	[id number]	Insurance	[facility] 10/08/2021	10/14/2021	pregabalin 50 mg capsule 	60	30	Shanice Escamilla	[id number]	Insurance	[facility] 09/23/2021	09/25/2021	clonazepam 0.5 mg tablet 	60	30	Ozzie Ackerman MD	[id number]	Insurance	[facility] 09/23/2021	09/25/2021	zolpidem tartrate 10 mg tablet 	30	30	Ozzie Ackerman MD	[id number]	Insurance	[facility] 09/13/2021	09/14/2021	pregabalin 50 mg capsule 	60	30	Shanice Escamilla	[id number]	Insurance	[facility] 08/14/2021	08/18/2021	clonazepam 0.5 mg tablet 	60	30	Ozzie Ackerman MD	[id number]	Insurance	[facility] 06/09/2021	08/18/2021	zolpidem tartrate 10 mg tablet 	30	30	Ozzie Ackerman MD	[id number]	Insurance	[facility] 06/09/2021	07/18/2021	zolpidem tartrate 10 mg tablet 	30	30	Ozzie Ackerman MD	[id number]	Insurance	[facility] 07/14/2021	07/18/2021	clonazepam 0.5 mg tablet 	60	30	Ozzie Ackerman MD	[id number]	Insurance	[facility] 07/09/2021	07/09/2021	hydrocodone-acetaminophen 5-325 mg tablet 	12	2	Gideon Moreno	[id number]	Insurance	[facility] 06/21/2021	06/21/2021	hydrocodone-acetaminophen 5-325 mg tablet 	18	3	Gideon Moreno	[id number]	Insurance	[facility] 06/09/2021	06/14/2021	zolpidem tartrate 10 mg tablet 	30	30	Ozzie Ackerman MD	[id number]	Insurance	[id number]  Patient Name: Corrie Jennings  YOB: 1985  Address: [address] [place] [address]  Sex: Female  Rx Written	Rx Dispensed	Drug	Quantity	Days Supply	Prescriber Name	Prescriber Bekah #	Payment Method	Dispenser 08/18/2021	08/19/2021	pregabalin 25 mg capsule 	60	30	Shanice Escamilla	[id number]	Insurance	[facility] [id number] 06/02/2021	06/04/2021	clonazepam 0.5 mg tablet 	60	30	Elizabeth Epstein (DO)	[id number]	Insurance	[id number] 03/04/2021	05/14/2021	zolpidem tartrate 10 mg tablet 	30	30	Ozzie Ackerman MD	[id number]	Insurance	[facility] 03/04/2021	04/09/2021	zolpidem tartrate 10 mg tablet 	30	30	Ozzie Ackerman MD	[id number]	Insurance	[id number] 03/27/2021	03/30/2021	clonazepam 0.5 mg tablet 	60	30	Ozzie Ackerman MD	[id number]	Insurance	[facility] 03/04/2021	03/11/2021	zolpidem tartrate 10 mg tablet 	30	30	Ozzie Ackerman MD	[id number]	Insurance	[id number] 11/30/2020	02/05/2021	zolpidem tartrate 10 mg tablet 	30	30	Ozzie Ackerman MD	[id number]	Insurance	[facility] 02/05/2021	02/05/2021	clonazepam 0.5 mg tablet 	60	30	Ozzie Ackerman MD	[id number]	Insurance	[facility] 11/30/2020	12/31/2020	zolpidem tartrate 10 mg tablet 	30	30	Ozzie Ackerman MD	[id number]	Insurance	[facility] 12/23/2020	12/31/2020	alprazolam 0.5 mg tablet 	90	30	Ozzie Ackerman MD	[id number]	Insurance	[id number]    PLAN: Continue Venlafaxine 75 mg PO QD for anxiety and depression.  Continue Vistaril 25 mg PO TID PRN for insomnia Continue Vyvanse 50 mg PO QD for ADHD and binge eating disorder Increase Cogentin 0.1 to 0.5 mg PO QHS for sweating.  Continue Trazodone 200 mg PO QHS for insomnia  Continue Klonopin 0.5 mg PO BID PRN for anxiety.  D/C Zoloft 50 mg PO QD for depression and anxiety D/C Wellbutrin  PO QAM for depression, low motivation, energy and concentration D/C Lexapro 15 mg PO QD for depression and anxiety D/C Abilify 7 mg PO QHS for depression augmentation D/C Cymbalta 60 mg PO QHS for depression, anxiety, and neuropathic pain D/C Adderall 5 mg PO QD for ADHD - Discussed risks and benefits of medications including side effects of GI and sexual side effects with SSRI. Alternative strategies including no intervention discussed with patient. Patient consents to current medications as prescribed. - Patient understands to contact clinic prn with concerns and agrees to call 911 or go to nearest ER if symptoms worsen. - Next appointment made in 3 month. Patient was not in any distress.

## 2025-03-12 NOTE — HISTORY OF PRESENT ILLNESS
[de-identified] : The following service was provided using telehealth between writer/provider and patient. The patient was at home. The writer was at clinic. Patient was alone and consented to telehealth format. All treatment plans through and including today's date were reviewed with the patient.  Patient is here for 3 month follow-up visit via telehealth.  At that time Venlafaxine 75 mg was started on the patient. States she likes it. Less panic attacks. No Klonopin use. Less anxiety and depression.   Mood: Less panic. Less anxiety and depression.  Sleepin-8 hours with Trazodone and Vistaril. +Has hot flashes. Has been using the Cogentin as BID which has been helping her rather than once a day.  Appetite is better. Binges less on the Vyvanse 50 mg.  Energy, concentration and motivation are all improved with the Vyvanse. Denies any AVH, SI or HI +Drug holiday [Home] : at home, [unfilled] , at the time of the visit. [Medical Office: ([facility])___] : at the medical office located in  [Verbal consent obtained from patient] : the patient, [unfilled]

## 2025-03-17 ENCOUNTER — APPOINTMENT (OUTPATIENT)
Dept: INTERNAL MEDICINE | Facility: CLINIC | Age: 40
End: 2025-03-17

## 2025-03-21 DIAGNOSIS — N39.0 URINARY TRACT INFECTION, SITE NOT SPECIFIED: ICD-10-CM

## 2025-03-21 RX ORDER — NITROFURANTOIN (MONOHYDRATE/MACROCRYSTALS) 25; 75 MG/1; MG/1
100 CAPSULE ORAL
Qty: 10 | Refills: 0 | Status: ACTIVE | COMMUNITY
Start: 2025-03-21 | End: 1900-01-01

## 2025-03-26 RX ORDER — TIRZEPATIDE 15 MG/.5ML
15 INJECTION, SOLUTION SUBCUTANEOUS
Qty: 4 | Refills: 0 | Status: ACTIVE | COMMUNITY
Start: 2025-03-26 | End: 1900-01-01

## 2025-03-28 ENCOUNTER — RX RENEWAL (OUTPATIENT)
Age: 40
End: 2025-03-28

## 2025-04-01 ENCOUNTER — APPOINTMENT (OUTPATIENT)
Dept: DERMATOLOGY | Facility: CLINIC | Age: 40
End: 2025-04-01
Payer: COMMERCIAL

## 2025-04-01 ENCOUNTER — NON-APPOINTMENT (OUTPATIENT)
Age: 40
End: 2025-04-01

## 2025-04-01 DIAGNOSIS — L21.9 SEBORRHEIC DERMATITIS, UNSPECIFIED: ICD-10-CM

## 2025-04-01 DIAGNOSIS — L65.0 TELOGEN EFFLUVIUM: ICD-10-CM

## 2025-04-01 DIAGNOSIS — L65.9 NONSCARRING HAIR LOSS, UNSPECIFIED: ICD-10-CM

## 2025-04-01 PROCEDURE — 99214 OFFICE O/P EST MOD 30 MIN: CPT

## 2025-04-01 RX ORDER — MINOXIDIL 2.5 MG/1
2.5 TABLET ORAL
Qty: 15 | Refills: 5 | Status: ACTIVE | COMMUNITY
Start: 2025-04-01 | End: 1900-01-01

## 2025-04-01 NOTE — HISTORY OF PRESENT ILLNESS
[FreeTextEntry1] : RP hair loss [de-identified] : RP 40yo F presents for hair loss present for over a year, has noticed increased hair shedding, worsening on zepbound labs reviewed: CBC, CMP, iron, TIB, ferritin, TSH wnl vit D low 20.6 - has been low for several years. Not taking supplements Has been usgin topical minoxdil but only about 4x/week also using keto shampoo

## 2025-04-01 NOTE — ASSESSMENT
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 Pt will discuss supplementation with her PCP, though recently started Nutrafol which has 2500IU of Vit D -will transition to po minoxidil as unable to apply topical daily -Start PO minoxidil 1.25 mg daily (instructed to use pill cutter to cut 2.5 mg tablet in half) - Discussed off-label use of medication. Reviewed expectation of ~6 mos of treatment to determine efficacy and need for continued use for sustained effect. SED including but not limited to hypertrichosis, temporary shedding of hair, hypotension, headache, lightheadedness, tachycardia, fluid retention, rare cardiovascular side effects.  #Seb derm - scalp -chronic, stable -I have discussed the chronic nature and course of this condition -ketoconazole 2% shampoo 2-3 times per week, leave on for 5-7 mins before rinsing out   RTC 6 months

## 2025-04-01 NOTE — PHYSICAL EXAM
[Alert] : alert [Oriented x 3] : ~L oriented x 3 [Declined] : declined [FreeTextEntry3] : Focused exam: -positive hair pull test -widening of hair part, mild thinning on frontal, crown of scalp

## 2025-04-02 DIAGNOSIS — E55.9 VITAMIN D DEFICIENCY, UNSPECIFIED: ICD-10-CM

## 2025-04-02 RX ORDER — CHOLECALCIFEROL (VITAMIN D3) 1250 MCG
1.25 MG CAPSULE ORAL
Qty: 1 | Refills: 0 | Status: ACTIVE | COMMUNITY
Start: 2025-04-02 | End: 1900-01-01

## 2025-04-22 ENCOUNTER — APPOINTMENT (OUTPATIENT)
Dept: OPHTHALMOLOGY | Facility: CLINIC | Age: 40
End: 2025-04-22

## 2025-04-29 ENCOUNTER — EMERGENCY (EMERGENCY)
Facility: HOSPITAL | Age: 40
LOS: 1 days | End: 2025-04-29
Attending: STUDENT IN AN ORGANIZED HEALTH CARE EDUCATION/TRAINING PROGRAM | Admitting: STUDENT IN AN ORGANIZED HEALTH CARE EDUCATION/TRAINING PROGRAM
Payer: COMMERCIAL

## 2025-04-29 VITALS
OXYGEN SATURATION: 99 % | HEIGHT: 63 IN | SYSTOLIC BLOOD PRESSURE: 107 MMHG | WEIGHT: 171.96 LBS | DIASTOLIC BLOOD PRESSURE: 65 MMHG | HEART RATE: 110 BPM | TEMPERATURE: 98 F | RESPIRATION RATE: 16 BRPM

## 2025-04-29 VITALS
SYSTOLIC BLOOD PRESSURE: 117 MMHG | RESPIRATION RATE: 16 BRPM | DIASTOLIC BLOOD PRESSURE: 76 MMHG | TEMPERATURE: 98 F | OXYGEN SATURATION: 99 % | HEART RATE: 77 BPM

## 2025-04-29 DIAGNOSIS — Z98.51 TUBAL LIGATION STATUS: Chronic | ICD-10-CM

## 2025-04-29 LAB
ALBUMIN SERPL ELPH-MCNC: 3.7 G/DL — SIGNIFICANT CHANGE UP (ref 3.3–5)
ALP SERPL-CCNC: 70 U/L — SIGNIFICANT CHANGE UP (ref 40–120)
ALT FLD-CCNC: 18 U/L — SIGNIFICANT CHANGE UP (ref 4–33)
ANION GAP SERPL CALC-SCNC: 14 MMOL/L — SIGNIFICANT CHANGE UP (ref 7–14)
APTT BLD: 35.4 SEC — SIGNIFICANT CHANGE UP (ref 26.1–36.8)
AST SERPL-CCNC: 33 U/L — HIGH (ref 4–32)
BASOPHILS # BLD AUTO: 0.03 K/UL — SIGNIFICANT CHANGE UP (ref 0–0.2)
BASOPHILS NFR BLD AUTO: 0.3 % — SIGNIFICANT CHANGE UP (ref 0–2)
BILIRUB SERPL-MCNC: 0.3 MG/DL — SIGNIFICANT CHANGE UP (ref 0.2–1.2)
BUN SERPL-MCNC: 12 MG/DL — SIGNIFICANT CHANGE UP (ref 7–23)
CALCIUM SERPL-MCNC: 8.9 MG/DL — SIGNIFICANT CHANGE UP (ref 8.4–10.5)
CHLORIDE SERPL-SCNC: 105 MMOL/L — SIGNIFICANT CHANGE UP (ref 98–107)
CO2 SERPL-SCNC: 15 MMOL/L — LOW (ref 22–31)
CREAT SERPL-MCNC: 0.56 MG/DL — SIGNIFICANT CHANGE UP (ref 0.5–1.3)
D DIMER BLD IA.RAPID-MCNC: 186 NG/ML DDU — SIGNIFICANT CHANGE UP
EGFR: 119 ML/MIN/1.73M2 — SIGNIFICANT CHANGE UP
EGFR: 119 ML/MIN/1.73M2 — SIGNIFICANT CHANGE UP
EOSINOPHIL # BLD AUTO: 0.17 K/UL — SIGNIFICANT CHANGE UP (ref 0–0.5)
EOSINOPHIL NFR BLD AUTO: 1.8 % — SIGNIFICANT CHANGE UP (ref 0–6)
GLUCOSE SERPL-MCNC: 70 MG/DL — SIGNIFICANT CHANGE UP (ref 70–99)
HCG SERPL-ACNC: <1 MIU/ML — SIGNIFICANT CHANGE UP
HCT VFR BLD CALC: 40.7 % — SIGNIFICANT CHANGE UP (ref 34.5–45)
HGB BLD-MCNC: 13.4 G/DL — SIGNIFICANT CHANGE UP (ref 11.5–15.5)
IANC: 5.71 K/UL — SIGNIFICANT CHANGE UP (ref 1.8–7.4)
IMM GRANULOCYTES NFR BLD AUTO: 0.3 % — SIGNIFICANT CHANGE UP (ref 0–0.9)
INR BLD: 0.97 RATIO — SIGNIFICANT CHANGE UP (ref 0.85–1.16)
LYMPHOCYTES # BLD AUTO: 2.87 K/UL — SIGNIFICANT CHANGE UP (ref 1–3.3)
LYMPHOCYTES # BLD AUTO: 30.8 % — SIGNIFICANT CHANGE UP (ref 13–44)
MCHC RBC-ENTMCNC: 28.3 PG — SIGNIFICANT CHANGE UP (ref 27–34)
MCHC RBC-ENTMCNC: 32.9 G/DL — SIGNIFICANT CHANGE UP (ref 32–36)
MCV RBC AUTO: 85.9 FL — SIGNIFICANT CHANGE UP (ref 80–100)
MONOCYTES # BLD AUTO: 0.51 K/UL — SIGNIFICANT CHANGE UP (ref 0–0.9)
MONOCYTES NFR BLD AUTO: 5.5 % — SIGNIFICANT CHANGE UP (ref 2–14)
NEUTROPHILS # BLD AUTO: 5.71 K/UL — SIGNIFICANT CHANGE UP (ref 1.8–7.4)
NEUTROPHILS NFR BLD AUTO: 61.3 % — SIGNIFICANT CHANGE UP (ref 43–77)
NRBC # BLD AUTO: 0 K/UL — SIGNIFICANT CHANGE UP (ref 0–0)
NRBC # FLD: 0 K/UL — SIGNIFICANT CHANGE UP (ref 0–0)
NRBC BLD AUTO-RTO: 0 /100 WBCS — SIGNIFICANT CHANGE UP (ref 0–0)
PLATELET # BLD AUTO: 270 K/UL — SIGNIFICANT CHANGE UP (ref 150–400)
POTASSIUM SERPL-MCNC: 5.1 MMOL/L — SIGNIFICANT CHANGE UP (ref 3.5–5.3)
POTASSIUM SERPL-SCNC: 5.1 MMOL/L — SIGNIFICANT CHANGE UP (ref 3.5–5.3)
PROT SERPL-MCNC: 7.5 G/DL — SIGNIFICANT CHANGE UP (ref 6–8.3)
PROTHROM AB SERPL-ACNC: 11.5 SEC — SIGNIFICANT CHANGE UP (ref 9.9–13.4)
RBC # BLD: 4.74 M/UL — SIGNIFICANT CHANGE UP (ref 3.8–5.2)
RBC # FLD: 13.1 % — SIGNIFICANT CHANGE UP (ref 10.3–14.5)
SODIUM SERPL-SCNC: 134 MMOL/L — LOW (ref 135–145)
TROPONIN T, HIGH SENSITIVITY RESULT: <6 NG/L — SIGNIFICANT CHANGE UP
WBC # BLD: 9.32 K/UL — SIGNIFICANT CHANGE UP (ref 3.8–10.5)
WBC # FLD AUTO: 9.32 K/UL — SIGNIFICANT CHANGE UP (ref 3.8–10.5)

## 2025-04-29 PROCEDURE — 99285 EMERGENCY DEPT VISIT HI MDM: CPT

## 2025-04-29 PROCEDURE — 70498 CT ANGIOGRAPHY NECK: CPT | Mod: 26

## 2025-04-29 PROCEDURE — 71046 X-RAY EXAM CHEST 2 VIEWS: CPT | Mod: 26

## 2025-04-29 PROCEDURE — 93010 ELECTROCARDIOGRAM REPORT: CPT

## 2025-04-29 PROCEDURE — 70496 CT ANGIOGRAPHY HEAD: CPT | Mod: 26

## 2025-04-29 RX ORDER — DIAZEPAM 2 MG/1
2 TABLET ORAL ONCE
Refills: 0 | Status: DISCONTINUED | OUTPATIENT
Start: 2025-04-29 | End: 2025-04-29

## 2025-04-29 RX ORDER — METHOCARBAMOL 500 MG/1
1500 TABLET, FILM COATED ORAL ONCE
Refills: 0 | Status: COMPLETED | OUTPATIENT
Start: 2025-04-29 | End: 2025-04-29

## 2025-04-29 RX ORDER — METHOCARBAMOL 500 MG/1
1 TABLET, FILM COATED ORAL
Qty: 21 | Refills: 0
Start: 2025-04-29 | End: 2025-05-05

## 2025-04-29 RX ORDER — OXYCODONE HYDROCHLORIDE 30 MG/1
5 TABLET ORAL ONCE
Refills: 0 | Status: DISCONTINUED | OUTPATIENT
Start: 2025-04-29 | End: 2025-04-29

## 2025-04-29 RX ORDER — ACETAMINOPHEN 500 MG/5ML
1000 LIQUID (ML) ORAL ONCE
Refills: 0 | Status: COMPLETED | OUTPATIENT
Start: 2025-04-29 | End: 2025-04-29

## 2025-04-29 RX ORDER — OXYCODONE HYDROCHLORIDE 30 MG/1
1 TABLET ORAL
Qty: 9 | Refills: 0
Start: 2025-04-29 | End: 2025-05-01

## 2025-04-29 RX ORDER — IBUPROFEN 200 MG
1 TABLET ORAL
Qty: 21 | Refills: 0
Start: 2025-04-29 | End: 2025-05-05

## 2025-04-29 RX ADMIN — METHOCARBAMOL 1500 MILLIGRAM(S): 500 TABLET, FILM COATED ORAL at 18:00

## 2025-04-29 RX ADMIN — Medication 1000 MILLILITER(S): at 18:01

## 2025-04-29 RX ADMIN — OXYCODONE HYDROCHLORIDE 5 MILLIGRAM(S): 30 TABLET ORAL at 20:05

## 2025-04-29 RX ADMIN — Medication 400 MILLIGRAM(S): at 18:00

## 2025-04-29 NOTE — ED PROVIDER NOTE - CARE PROVIDER_API CALL
Berny Peralta  Internal Medicine  [address] [place] [address]  Phone: [phone]  Fax: [phone]  Follow Up Time: 4-6 Days

## 2025-04-29 NOTE — ED PROVIDER NOTE - PATIENT PORTAL LINK FT
You can access the FollowMyHealth Patient Portal offered by [facility] by registering at the following website: [email or web address]. By joining [facility]’s FollowMyHealth portal, you will also be able to view your health information using other applications (apps) compatible with our system.

## 2025-04-29 NOTE — ED PROVIDER NOTE - PHYSICAL EXAMINATION
General: Well appearing female in no acute distress  HEENT: Normocephalic, atraumatic. Moist mucous membranes. Oropharynx clear. No lymphadenopathy.  Eyes: No scleral icterus. EOMI. JEMIMA.  Neck:. Soft and supple. Full ROM without pain. No midline tenderness  Cardiac: Regular rate and regular rhythm. No murmurs, rubs, gallops. Peripheral pulses 2+ and symmetric. No LE edema.  Resp: Lungs CTAB. Speaking in full sentences. No wheezes, rales or rhonchi.  Abd: Soft, non-tender, non-distended. No guarding or rebound. No scars, masses, or lesions.  Back: Spine midline and non-tender. No CVA tenderness.    Skin: No rashes, abrasions, or lacerations.  Neuro: AO x 3. Moves all extremities symmetrically. Motor strength and sensation grossly intact.

## 2025-04-29 NOTE — ED PROVIDER NOTE - NSFOLLOWUPCLINICS_GEN_ALL_ED_FT
Cardiology at [facility]  Cardiology  [address] [place] [id number]  Phone: [phone]  Fax:     Cardiology at [facility]  Cardiology  [address] [place] [address]  Phone: [phone]  Fax:

## 2025-04-29 NOTE — ED PROVIDER NOTE - OBJECTIVE STATEMENT
40 y/o F hx of idiopathic intracranial hypertension, migraines presents w/ chest discomfort. endorsing chest discomfort L side 38 y/o F hx of idiopathic intracranial hypertension, migraines presents w/ chest discomfort. endorsing chest discomfort L side. intermittent in nature for the past 5 days . states she has been moving things around the house and lifting things . has spasms/cramping sensation/tightness in the RUE and RLE. endorsing mild headache similar to her migraines but states she is not here for that. denies falls. denies numbness. endorsing tingling in the RUE and RLE. denies abdominal pain. denies fever/chills. denies nausea/vomiting. denies cough.

## 2025-04-29 NOTE — ED ADULT NURSE NOTE - OBJECTIVE STATEMENT
pt received to intake 13, ambulatory with steady gait, hx of intercranial hypertension, migraines, anxiety and depression, coming to ED c/o right side body tingling/ "heaviness". Endorsing palpitations, headache, and SOB. RR even and unlabored on RA. abd soft nontender and nondistended. PERRLA b/l, speech clear, face symmetrical, +sensation x4 extremities. decreased strength noted to right upper extremity. -drift x4 extremities. skin intact. pending orders. safety maintained.

## 2025-04-29 NOTE — ED ADULT NURSE NOTE - CHIEF COMPLAINT QUOTE
pt c/o right side body tingling, chest pain and SOB since saturday. pt states she is also feeling very thirsty. FS 80 in triage.

## 2025-04-29 NOTE — ED PROVIDER NOTE - NSFOLLOWUPINSTRUCTIONS_ED_ALL_ED_FT
can take medication for pain only as needed   followup with primary/cardiologist in next 1-5 days    Chest Pain    Chest pain can be caused by many different conditions which may or may not be dangerous. Causes include heartburn, lung infections, heart attack, blood clot in lungs, skin infections, strain or damage to muscle, cartilage, or bones, etc. In addition to a history and physical examination, an electrocardiogram (ECG) or other lab tests may have been performed to determine the cause of your chest pain. Follow up with your primary care provider or with a cardiologist as instructed.     SEEK IMMEDIATE MEDICAL CARE IF YOU HAVE ANY OF THE FOLLOWING SYMPTOMS: worsening chest pain, coughing up blood, unexplained back/neck/jaw pain, severe abdominal pain, dizziness or lightheadedness, fainting, shortness of breath, sweaty or clammy skin, vomiting, or racing heart beat. These symptoms may represent a serious problem that is an emergency. Do not wait to see if the symptoms will go away. Get medical help right away. Call 911 and do not drive yourself to the hospital.

## 2025-04-29 NOTE — ED PROVIDER NOTE - CLINICAL SUMMARY MEDICAL DECISION MAKING FREE TEXT BOX
40 y/o F hx of idiopathic intracranial hypertension, migraines presents w/ chest discomfort. endorsing chest discomfort L side. intermittent in nature for the past 5 days . states she has been moving things around the house and lifting things . has spasms/cramping sensation/tightness in the RUE and RLE. endorsing mild headache similar to her migraines but states she is not here for that. denies falls. denies numbness. endorsing tingling in the RUE and RLE. denies abdominal pain. denies fever/chills. denies nausea/vomiting. denies cough.   neurovascularly intact RUE and RLE. compartments soft RUE and RLE. well appearing, no focal deficits  not diaphoretic   EKG NSR, no stemi   check lytes   CT head/CTAs - low suspicion of bleed/ occlusion leading to R sided symptoms - appears to be more pain related based on her description.   low suspicion of acs , check labs   appears symptoms are more radicular/msk in nature.     EKG reviewed, no actionable findings   CTs non-actionable  pain controlled s/p oxycodone/muscle relaxants.   return precautions discussed. Conversation had with patient regarding results of testing. Patient agrees with plan for discharge at this time. Patient agrees to comply with follow up with PCP. Return to ED precautions and discharge instructions given to patient.

## 2025-04-30 ENCOUNTER — APPOINTMENT (OUTPATIENT)
Dept: INTERNAL MEDICINE | Facility: CLINIC | Age: 40
End: 2025-04-30
Payer: COMMERCIAL

## 2025-04-30 ENCOUNTER — NON-APPOINTMENT (OUTPATIENT)
Age: 40
End: 2025-04-30

## 2025-04-30 VITALS
HEIGHT: 64 IN | SYSTOLIC BLOOD PRESSURE: 120 MMHG | BODY MASS INDEX: 29.37 KG/M2 | WEIGHT: 172 LBS | DIASTOLIC BLOOD PRESSURE: 70 MMHG

## 2025-04-30 DIAGNOSIS — M54.12 RADICULOPATHY, CERVICAL REGION: ICD-10-CM

## 2025-04-30 DIAGNOSIS — Z00.00 ENCOUNTER FOR GENERAL ADULT MEDICAL EXAMINATION W/OUT ABNORMAL FINDINGS: ICD-10-CM

## 2025-04-30 DIAGNOSIS — Z86.19 PERSONAL HISTORY OF OTHER INFECTIOUS AND PARASITIC DISEASES: ICD-10-CM

## 2025-04-30 LAB
25(OH)D3 SERPL-MCNC: 32.7 NG/ML
ALBUMIN SERPL ELPH-MCNC: 4.4 G/DL
ALP BLD-CCNC: 76 U/L
ALT SERPL-CCNC: 18 U/L
ANION GAP SERPL CALC-SCNC: 13 MMOL/L
AST SERPL-CCNC: 21 U/L
BILIRUB SERPL-MCNC: 0.3 MG/DL
BUN SERPL-MCNC: 10 MG/DL
CALCIUM SERPL-MCNC: 9.5 MG/DL
CHLORIDE SERPL-SCNC: 104 MMOL/L
CHOLEST SERPL-MCNC: 200 MG/DL
CO2 SERPL-SCNC: 22 MMOL/L
CREAT SERPL-MCNC: 0.69 MG/DL
EGFRCR SERPLBLD CKD-EPI 2021: 113 ML/MIN/1.73M2
GLUCOSE SERPL-MCNC: 72 MG/DL
HDLC SERPL-MCNC: 51 MG/DL
LDLC SERPL-MCNC: 128 MG/DL
NONHDLC SERPL-MCNC: 148 MG/DL
POTASSIUM SERPL-SCNC: 4.1 MMOL/L
PROT SERPL-MCNC: 7.2 G/DL
RHEUMATOID FACT SER QL: 18 IU/ML
SODIUM SERPL-SCNC: 138 MMOL/L
T4 FREE SERPL-MCNC: 1.2 NG/DL
TRIGL SERPL-MCNC: 112 MG/DL
TSH SERPL-ACNC: 2.43 UIU/ML

## 2025-04-30 PROCEDURE — 36415 COLL VENOUS BLD VENIPUNCTURE: CPT

## 2025-04-30 PROCEDURE — 99395 PREV VISIT EST AGE 18-39: CPT

## 2025-04-30 PROCEDURE — 93000 ELECTROCARDIOGRAM COMPLETE: CPT | Mod: 59

## 2025-04-30 PROCEDURE — G0444 DEPRESSION SCREEN ANNUAL: CPT | Mod: 59

## 2025-04-30 RX ORDER — TIRZEPATIDE 15 MG/.5ML
15 INJECTION, SOLUTION SUBCUTANEOUS
Qty: 3 | Refills: 1 | Status: DISCONTINUED | COMMUNITY
Start: 2025-04-25 | End: 2025-04-30

## 2025-04-30 RX ORDER — TIRZEPATIDE 15 MG/.5ML
15 INJECTION, SOLUTION SUBCUTANEOUS
Qty: 3 | Refills: 1 | Status: ACTIVE | COMMUNITY
Start: 2025-04-25 | End: 1900-01-01

## 2025-04-30 NOTE — ASSESSMENT
[Vaccines Reviewed] : Immunizations reviewed today. Please see immunization details in the vaccine log within the immunization flowsheet.  [FreeTextEntry1] : 39F c migraine headache, depression, ADHD, obesity, tear of R. ankle, covid-19 infection (1/23), recurrent diverticulitis s/p partial colectomy (2018), H pylori s/p treatment (2025), obesity on treatment with zepbound here for cpe and right cervical radiculopathy getting better    ghm - check fasting bw  ecg performed during wellness exam to monitor for any cardiac condition - NSR declined furhter covid vacicne utd with gyn exam and pap smear utd with colonscopy -recently treated for H pylori by GI - has gi f/u  utd with fbse with derm utd w/neurology  has script for mammogram via gyn - to schedule r. cervical radiculopathy - getting better - can take tylenol prn - to apply lidocaine patch once daily to affected area prn (apply for 12 hours, remove after 12 hours) - soma prn    rtc in 3-4 months

## 2025-04-30 NOTE — PHYSICAL EXAM
[No Acute Distress] : no acute distress [Well Nourished] : well nourished [PERRL] : pupils equal round and reactive to light [Normal Oropharynx] : the oropharynx was normal [Normal TMs] : both tympanic membranes were normal [Normal Nasal Mucosa] : the nasal mucosa was normal [No Lymphadenopathy] : no lymphadenopathy [Supple] : supple [Thyroid Normal, No Nodules] : the thyroid was normal and there were no nodules present [No Accessory Muscle Use] : no accessory muscle use [Clear to Auscultation] : lungs were clear to auscultation bilaterally [Regular Rhythm] : with a regular rhythm [Normal S1, S2] : normal S1 and S2 [No Murmur] : no murmur heard [No Edema] : there was no peripheral edema [Soft] : abdomen soft [Non Tender] : non-tender [Non-distended] : non-distended [No Masses] : no abdominal mass palpated [No HSM] : no HSM [Normal Bowel Sounds] : normal bowel sounds [Normal Supraclavicular Nodes] : no supraclavicular lymphadenopathy [Normal Axillary Nodes] : no axillary lymphadenopathy [Normal Posterior Cervical Nodes] : no posterior cervical lymphadenopathy [Normal Anterior Cervical Nodes] : no anterior cervical lymphadenopathy [No Spinal Tenderness] : no spinal tenderness [Normal Affect] : the affect was normal [Normal Insight/Judgement] : insight and judgment were intact

## 2025-04-30 NOTE — HISTORY OF PRESENT ILLNESS
[FreeTextEntry1] :  CPE [de-identified] :  went to ED for SOB and R. hand tingling - resolved  was lkely 2/2 R. cervical radiculopathy for R. hand tingling as pt had been cleaning day prior  sob may be due to anxiety 2/2 recent stress at work  ED rx'd soma and oxycodone prn  pt advised not to take oxycodne  took 1 alelve ystesrday that helped cxr normal, CT angio head and neck normal D-dimer negative  states had egd/colonoscopy 2024 - + h pylori s/p treatment - due for gi f/u     in ED, given IVF, IV tylenol,   diet: good exercise: "could be better"

## 2025-05-01 LAB
ANA SER IF-ACNC: NEGATIVE
APPEARANCE: CLEAR
BASOPHILS # BLD AUTO: 0.03 K/UL
BASOPHILS NFR BLD AUTO: 0.4 %
BILIRUBIN URINE: NEGATIVE
BLOOD URINE: NEGATIVE
C TRACH RRNA SPEC QL NAA+PROBE: NOT DETECTED
COLOR: YELLOW
EOSINOPHIL # BLD AUTO: 0.16 K/UL
EOSINOPHIL NFR BLD AUTO: 2.4 %
ESTIMATED AVERAGE GLUCOSE: 94 MG/DL
GLUCOSE QUALITATIVE U: NEGATIVE MG/DL
HBA1C MFR BLD HPLC: 4.9 %
HCT VFR BLD CALC: 40.5 %
HCV AB SER QL: NONREACTIVE
HCV S/CO RATIO: 0.14 S/CO
HGB BLD-MCNC: 13.3 G/DL
HIV1+2 AB SPEC QL IA.RAPID: NONREACTIVE
IMM GRANULOCYTES NFR BLD AUTO: 0.3 %
KETONES URINE: NEGATIVE MG/DL
LEUKOCYTE ESTERASE URINE: NEGATIVE
LYMPHOCYTES # BLD AUTO: 2.08 K/UL
LYMPHOCYTES NFR BLD AUTO: 30.7 %
MAN DIFF?: NORMAL
MCHC RBC-ENTMCNC: 28.4 PG
MCHC RBC-ENTMCNC: 32.8 G/DL
MCV RBC AUTO: 86.4 FL
MONOCYTES # BLD AUTO: 0.43 K/UL
MONOCYTES NFR BLD AUTO: 6.4 %
N GONORRHOEA RRNA SPEC QL NAA+PROBE: NOT DETECTED
NEUTROPHILS # BLD AUTO: 4.05 K/UL
NEUTROPHILS NFR BLD AUTO: 59.8 %
NITRITE URINE: NEGATIVE
PH URINE: 6.5
PLATELET # BLD AUTO: 298 K/UL
PROTEIN URINE: NEGATIVE MG/DL
RBC # BLD: 4.69 M/UL
RBC # FLD: 13.2 %
SOURCE AMPLIFICATION: NORMAL
SPECIFIC GRAVITY URINE: 1.01
UROBILINOGEN URINE: 0.2 MG/DL
WBC # FLD AUTO: 6.77 K/UL

## 2025-05-14 ENCOUNTER — TRANSCRIPTION ENCOUNTER (OUTPATIENT)
Age: 40
End: 2025-05-14

## 2025-05-21 ENCOUNTER — APPOINTMENT (OUTPATIENT)
Dept: PAIN MANAGEMENT | Facility: CLINIC | Age: 40
End: 2025-05-21

## 2025-05-21 VITALS
SYSTOLIC BLOOD PRESSURE: 116 MMHG | HEART RATE: 107 BPM | DIASTOLIC BLOOD PRESSURE: 90 MMHG | BODY MASS INDEX: 27.31 KG/M2 | WEIGHT: 160 LBS | HEIGHT: 64 IN

## 2025-05-21 PROCEDURE — 99213 OFFICE O/P EST LOW 20 MIN: CPT

## 2025-05-21 NOTE — REVIEW OF SYSTEMS
[Fever] : no fever [Feeling Poorly] : feeling poorly [Feeling Tired] : not feeling tired [Eyesight Problems] : no eyesight problems [Nasal Discharge] : no nasal discharge [Chest Pain] : no chest pain [Cough] : no cough [Arthralgias] : arthralgias [Neck Pain] : neck pain [Skin Lesions] : no skin lesions [Itching] : no itching [Muscle Weakness] : no muscle weakness

## 2025-05-21 NOTE — PHYSICAL EXAM
[General Appearance - Alert] : alert [General Appearance - In No Acute Distress] : in no acute distress [General Appearance - Well Nourished] : well nourished [General Appearance - Well Developed] : well developed [General Appearance - Well-Appearing] : healthy appearing [Oriented To Time, Place, And Person] : oriented to person, place, and time [Impaired Insight] : insight and judgment were intact [Affect] : the affect was normal [Mood] : the mood was normal [Memory Recent] : recent memory was not impaired [Memory Remote] : remote memory was not impaired [Person] : oriented to person [Place] : oriented to place [Time] : oriented to time [Short Term Intact] : short term memory intact [Remote Intact] : remote memory intact [Registration Intact] : recent registration memory intact [Concentration Intact] : normal concentrating ability [Visual Intact] : visual attention was ~T not ~L decreased [Naming Objects] : no difficulty naming common objects [Fluency] : fluency intact [Comprehension] : comprehension intact [Current Events] : adequate knowledge of current events [Past History] : adequate knowledge of personal past history [Cranial Nerves Oculomotor (III)] : extraocular motion intact [Cranial Nerves Facial (VII)] : face symmetrical [Cranial Nerves Vestibulocochlear (VIII)] : hearing was intact bilaterally [Cranial Nerves Accessory (XI - Cranial And Spinal)] : head turning and shoulder shrug symmetric [Cranial Nerves Hypoglossal (XII)] : there was no tongue deviation with protrusion [Motor Handedness Right-Handed] : the patient is right hand dominant [Motor Strength Upper Extremities Bilaterally] : strength was normal in both upper extremities [Dysdiadochokinesia Bilaterally] : not present [Sclera] : the sclera and conjunctiva were normal [No WILDA] : no internuclear ophthalmoplegia [Strabismus] : no strabismus was seen [Outer Ear] : the ears and nose were normal in appearance [Neck Cervical Mass (___cm)] : no neck mass was observed [Exaggerated Use Of Accessory Muscles For Inspiration] : no accessory muscle use [Edema] : there was no peripheral edema [Involuntary Movements] : no involuntary movements were seen [Skin Color & Pigmentation] : normal skin color and pigmentation [] : no rash

## 2025-05-21 NOTE — HISTORY OF PRESENT ILLNESS
[FreeTextEntry1] : Pt notes she is doing well with Ajovy and with more recent Ubrelvy. No new symptoms.

## 2025-06-03 ENCOUNTER — APPOINTMENT (OUTPATIENT)
Dept: PSYCHIATRY | Facility: CLINIC | Age: 40
End: 2025-06-03
Payer: COMMERCIAL

## 2025-06-03 PROCEDURE — 99214 OFFICE O/P EST MOD 30 MIN: CPT | Mod: 95

## 2025-06-03 NOTE — HISTORY OF PRESENT ILLNESS
[de-identified] : Patient is here for 3 month follow-up visit via telehealth.  At that time Cogentin was increased from 0.1 to 0.5 mg for sweating. States it has not helped her at all. Patient states she has a couple of issues she is dealing with in regard to side effects: she had 3 panic attacks, profusely sweating and having sexual side effects (not able to climax).   Mood: anxious. Had 3 panic attacks this month. More anxiety than depression. Has been using the Klonopin.  Sleepin-8 hours with Trazodone and Vistaril. +Has hot flashes. Cogentin is of no help.  Appetite is better. Binges less on the Vyvanse 50 mg.  Energy, and motivation are all improved with the Vyvanse. Concentration is decreased. Denies any AVH, SI or HI +Drug holiday

## 2025-06-03 NOTE — DISCUSSION/SUMMARY
[FreeTextEntry1] : Assessment: Patient is a 37 yo  female with h/o depression and anxiety seen today for medication management. . Patient is compliant with medications and tolerating without any new reported side effects. I-stop reviewed and no issues noticed.  I-STOP: Patient Name: Corrie Jennings  YOB: 1985  Address: [address] [place] [address] [address]VE [facility] [place] [address]  Sex: Female  Rx Written	Rx Dispensed	Drug	Quantity	Days Supply	Prescriber Name	Prescriber Bekah #	Payment Method	Dispenser 12/20/2021	12/22/2021	clonazepam 0.5 mg tablet 	60	30	Ozzie Ackerman MD	[id number]	Insurance	[id number] 09/23/2021	12/04/2021	zolpidem tartrate 10 mg tablet 	30	30	Ozzie Ackerman MD	[id number]	Insurance	[id number] 11/20/2021	11/20/2021	pregabalin 50 mg capsule 	60	30	Ozzie Ackerman MD	[id number]	Insurance	[id number] 09/23/2021	10/26/2021	zolpidem tartrate 10 mg tablet 	30	30	Ozzie Ackerman MD	[id number]	Insurance	[id number] 10/25/2021	10/26/2021	clonazepam 0.5 mg tablet 	60	30	Ozzie Ackerman MD	[id number]	Insurance	[id number] 10/08/2021	10/14/2021	pregabalin 50 mg capsule 	60	30	Shanice Escamilla	[id number]	Insurance	[id number] 09/23/2021	09/25/2021	clonazepam 0.5 mg tablet 	60	30	Ozzie Ackerman MD	[id number]	Insurance	[id number] 09/23/2021	09/25/2021	zolpidem tartrate 10 mg tablet 	30	30	Ozzie Ackerman MD	[id number]	Insurance	[id number] 09/13/2021	09/14/2021	pregabalin 50 mg capsule 	60	30	Shanice Escamilla	[id number]	Insurance	[id number] 08/14/2021	08/18/2021	clonazepam 0.5 mg tablet 	60	30	Ozzie Ackerman MD	[id number]	Insurance	[id number] 06/09/2021	08/18/2021	zolpidem tartrate 10 mg tablet 	30	30	Ozzie Ackerman MD	[id number]	Insurance	[id number] 06/09/2021	07/18/2021	zolpidem tartrate 10 mg tablet 	30	30	Ozzie Ackerman MD	[id number]	Insurance	[id number] 07/14/2021	07/18/2021	clonazepam 0.5 mg tablet 	60	30	Ozzie Ackerman MD	[id number]	Insurance	[id number] 07/09/2021	07/09/2021	hydrocodone-acetaminophen 5-325 mg tablet 	12	2	Gideon Moreno	[id number]	Insurance	[id number] 06/21/2021	06/21/2021	hydrocodone-acetaminophen 5-325 mg tablet 	18	3	Gideon Moreno	[id number]	Insurance	[id number] 06/09/2021	06/14/2021	zolpidem tartrate 10 mg tablet 	30	30	Ozzie Ackerman MD	[id number]	Insurance	[id number]  Patient Name: Corrie Jennings  YOB: 1985  Address: [address] [place] [address]  Sex: Female  Rx Written	Rx Dispensed	Drug	Quantity	Days Supply	Prescriber Name	Prescriber Bekah #	Payment Method	Dispenser 08/18/2021	08/19/2021	pregabalin 25 mg capsule 	60	30	Shanice Escamilla	[id number]	Insurance	[facility] [id number] 06/02/2021	06/04/2021	clonazepam 0.5 mg tablet 	60	30	Elizabeth Epstein (DO)	[id number]	Insurance	[id number] 03/04/2021	05/14/2021	zolpidem tartrate 10 mg tablet 	30	30	Ozzie Ackerman MD	[id number]	Insurance	[id number] 03/04/2021	04/09/2021	zolpidem tartrate 10 mg tablet 	30	30	Ozzie Ackerman MD	[id number]	Insurance	[id number] 03/27/2021	03/30/2021	clonazepam 0.5 mg tablet 	60	30	Ozzie Ackerman MD	[id number]	Insurance	[id number] 03/04/2021	03/11/2021	zolpidem tartrate 10 mg tablet 	30	30	Ozzie Ackerman MD	[id number]	Insurance	[id number] 11/30/2020	02/05/2021	zolpidem tartrate 10 mg tablet 	30	30	Ozzie Ackerman MD	[id number]	Insurance	[id number] 02/05/2021	02/05/2021	clonazepam 0.5 mg tablet 	60	30	Ozzie Ackerman MD	[id number]	Insurance	[id number] 11/30/2020	12/31/2020	zolpidem tartrate 10 mg tablet 	30	30	Ozzie Ackerman MD	[id number]	Insurance	[id number] 12/23/2020	12/31/2020	alprazolam 0.5 mg tablet 	90	30	Ozzie Ackerman MD	[id number]	Insurance	[id number]    PLAN: Decrease Venlafaxine 75 to 37.5 mg PO QD for anxiety and depression.  Continue Vistaril 25 mg PO TID PRN for insomnia Increase Vyvanse 50 to 60 mg PO QD for ADHD and binge eating disorder Hold Cogentin 0.1 to 0.5 mg PO QHS for sweating.  Continue Trazodone 200 mg PO QHS for insomnia  Continue Klonopin 0.5 mg PO BID PRN for anxiety.  D/C Zoloft 50 mg PO QD for depression and anxiety D/C Wellbutrin  PO QAM for depression, low motivation, energy and concentration D/C Lexapro 15 mg PO QD for depression and anxiety D/C Abilify 7 mg PO QHS for depression augmentation D/C Cymbalta 60 mg PO QHS for depression, anxiety, and neuropathic pain D/C Adderall 5 mg PO QD for ADHD - Discussed risks and benefits of medications including side effects of GI and sexual side effects with SSRI. Alternative strategies including no intervention discussed with patient. Patient consents to current medications as prescribed. - Patient understands to contact clinic prn with concerns and agrees to call 911 or go to nearest ER if symptoms worsen. - Next appointment made in 2 month. Patient was not in any distress.

## 2025-06-03 NOTE — REASON FOR VISIT
[Patient preference] : as per patient preference [Telehealth (audio & video) - Individual/Group] : This visit was provided via telehealth using real-time 2-way audio visual technology. [Medical Office: ([facility])___] : The provider was located at the medical office in [unfilled]. [Home] : The patient, [unfilled], was located at home, [unfilled], at the time of the visit. [Patient's space is appropriate for telehealth and maintains privacy/confidentiality.] : Patient's space is appropriate for telehealth and maintains privacy/confidentiality. [Participant(s) identity verified] : Participant(s) identity verified. [Verbal consent obtained from patient/other participant(s)] : Verbal consent for telehealth/telephonic services obtained from patient/other participant(s) [FreeTextEntry1] : depression and anxiety

## 2025-07-09 ENCOUNTER — APPOINTMENT (OUTPATIENT)
Dept: PSYCHIATRY | Facility: CLINIC | Age: 40
End: 2025-07-09
Payer: COMMERCIAL

## 2025-07-09 PROCEDURE — 99214 OFFICE O/P EST MOD 30 MIN: CPT | Mod: 95

## 2025-07-09 NOTE — DISCUSSION/SUMMARY
[FreeTextEntry1] : Assessment: Patient is a 37 yo  female with h/o depression and anxiety seen today for medication management. . Patient is compliant with medications and tolerating without any new reported side effects. I-stop reviewed and no issues noticed.  I-STOP: Patient Name: Corrie Jennings  YOB: 1985  Address: [address] [place] [address] [address]VE [facility] [place] [address]  Sex: Female  Rx Written	Rx Dispensed	Drug	Quantity	Days Supply	Prescriber Name	Prescriber Bekah #	Payment Method	Dispenser 12/20/2021	12/22/2021	clonazepam 0.5 mg tablet 	60	30	Ozzie Ackerman MD	[id number]	Insurance	[id number] 09/23/2021	12/04/2021	zolpidem tartrate 10 mg tablet 	30	30	Ozzie Ackerman MD	[id number]	Insurance	[id number] 11/20/2021	11/20/2021	pregabalin 50 mg capsule 	60	30	Ozzie Ackerman MD	[id number]	Insurance	[id number] 09/23/2021	10/26/2021	zolpidem tartrate 10 mg tablet 	30	30	Ozzie Ackerman MD	[id number]	Insurance	[id number] 10/25/2021	10/26/2021	clonazepam 0.5 mg tablet 	60	30	Ozzie Ackerman MD	[id number]	Insurance	[id number] 10/08/2021	10/14/2021	pregabalin 50 mg capsule 	60	30	Shanice Escamilla	[id number]	Insurance	[id number] 09/23/2021	09/25/2021	clonazepam 0.5 mg tablet 	60	30	Ozzie Ackerman MD	[id number]	Insurance	[id number] 09/23/2021	09/25/2021	zolpidem tartrate 10 mg tablet 	30	30	Ozzie Ackerman MD	[id number]	Insurance	[id number] 09/13/2021	09/14/2021	pregabalin 50 mg capsule 	60	30	Shanice Escamilla	[id number]	Insurance	[id number] 08/14/2021	08/18/2021	clonazepam 0.5 mg tablet 	60	30	Ozzie Ackerman MD	[id number]	Insurance	[id number] 06/09/2021	08/18/2021	zolpidem tartrate 10 mg tablet 	30	30	Ozzie Ackerman MD	[id number]	Insurance	[id number] 06/09/2021	07/18/2021	zolpidem tartrate 10 mg tablet 	30	30	Ozzie Ackerman MD	[id number]	Insurance	[id number] 07/14/2021	07/18/2021	clonazepam 0.5 mg tablet 	60	30	Ozzie Ackerman MD	[id number]	Insurance	[id number] 07/09/2021	07/09/2021	hydrocodone-acetaminophen 5-325 mg tablet 	12	2	Gideon Moreno	[id number]	Insurance	[id number] 06/21/2021	06/21/2021	hydrocodone-acetaminophen 5-325 mg tablet 	18	3	Gideon Moreno	[id number]	Insurance	[id number] 06/09/2021	06/14/2021	zolpidem tartrate 10 mg tablet 	30	30	Ozzie Ackerman MD	[id number]	Insurance	[id number]  Patient Name: Corrie Jennings  YOB: 1985  Address: [address] [place] [address]  Sex: Female  Rx Written	Rx Dispensed	Drug	Quantity	Days Supply	Prescriber Name	Prescriber Bekah #	Payment Method	Dispenser 08/18/2021	08/19/2021	pregabalin 25 mg capsule 	60	30	Shanice Escamilla	[id number]	Insurance	[facility] [id number] 06/02/2021	06/04/2021	clonazepam 0.5 mg tablet 	60	30	Elizabeth Epstein (DO)	[id number]	Insurance	[id number] 03/04/2021	05/14/2021	zolpidem tartrate 10 mg tablet 	30	30	Ozzie Ackerman MD	[id number]	Insurance	[id number] 03/04/2021	04/09/2021	zolpidem tartrate 10 mg tablet 	30	30	Ozzie Ackerman MD	[id number]	Insurance	[id number] 03/27/2021	03/30/2021	clonazepam 0.5 mg tablet 	60	30	Ozzie Ackerman MD	[id number]	Insurance	[id number] 03/04/2021	03/11/2021	zolpidem tartrate 10 mg tablet 	30	30	Ozzie Ackerman MD	[id number]	Insurance	[id number] 11/30/2020	02/05/2021	zolpidem tartrate 10 mg tablet 	30	30	Ozzie Ackerman MD	[id number]	Insurance	[id number] 02/05/2021	02/05/2021	clonazepam 0.5 mg tablet 	60	30	Ozzie Ackerman MD	[id number]	Insurance	[id number] 11/30/2020	12/31/2020	zolpidem tartrate 10 mg tablet 	30	30	Ozzie Ackerman MD	[id number]	Insurance	[id number] 12/23/2020	12/31/2020	alprazolam 0.5 mg tablet 	90	30	Ozzie Ackerman MD	[id number]	Insurance	[id number]    PLAN: Continue Venlafaxine 75 mg PO QD for anxiety and depression.  Continue Vistaril 25 mg PO TID PRN for insomnia Continue Vyvanse 60 mg PO QD for ADHD and binge eating disorder Hold Cogentin 0.1 to 0.5 mg PO QHS for sweating.  Continue Trazodone 200 mg PO QHS for insomnia  Continue Klonopin 0.5 mg PO BID PRN for anxiety.  D/C Zoloft 50 mg PO QD for depression and anxiety D/C Wellbutrin  PO QAM for depression, low motivation, energy and concentration D/C Lexapro 15 mg PO QD for depression and anxiety D/C Abilify 7 mg PO QHS for depression augmentation D/C Cymbalta 60 mg PO QHS for depression, anxiety, and neuropathic pain D/C Adderall 5 mg PO QD for ADHD - Discussed risks and benefits of medications including side effects of GI and sexual side effects with SSRI. Alternative strategies including no intervention discussed with patient. Patient consents to current medications as prescribed. - Patient understands to contact clinic prn with concerns and agrees to call 911 or go to nearest ER if symptoms worsen. - Next appointment made in 2 month. Patient was not in any distress.

## 2025-07-09 NOTE — REASON FOR VISIT
[Patient preference] : as per patient preference [Telehealth (audio & video) - Individual/Group] : This visit was provided via telehealth using real-time 2-way audio visual technology. [Medical Office: ([facility])___] : The provider was located at the medical office in [unfilled]. [Home] : The patient, [unfilled], was located at home, [unfilled], at the time of the visit. [Patient's space is appropriate for telehealth and maintains privacy/confidentiality.] : Patient's space is appropriate for telehealth and maintains privacy/confidentiality. [Participant(s) identity verified] : Participant(s) identity verified. [Verbal consent obtained from patient/other participant(s)] : Verbal consent for telehealth/telephonic services obtained from patient/other participant(s) [FreeTextEntry1] : depression and anxiety

## 2025-07-17 ENCOUNTER — APPOINTMENT (OUTPATIENT)
Facility: CLINIC | Age: 40
End: 2025-07-17
Payer: COMMERCIAL

## 2025-07-17 VITALS
WEIGHT: 167 LBS | DIASTOLIC BLOOD PRESSURE: 76 MMHG | SYSTOLIC BLOOD PRESSURE: 112 MMHG | HEIGHT: 64 IN | HEART RATE: 98 BPM | OXYGEN SATURATION: 97 % | BODY MASS INDEX: 28.51 KG/M2

## 2025-07-17 PROBLEM — L91.0 HYPERTROPHIC SCAR: Status: ACTIVE | Noted: 2025-07-17

## 2025-07-17 PROBLEM — Z98.890 HISTORY OF ABDOMINOPLASTY: Status: ACTIVE | Noted: 2025-07-17

## 2025-07-17 PROCEDURE — 99204 OFFICE O/P NEW MOD 45 MIN: CPT

## 2025-07-17 NOTE — PHYSICAL EXAM
[TextEntry] : Physical Exam  General: No acute distress, well-appearing Neuro: Alert and oriented x3, no focal deficits noted Psych: Appropriate mood and affect HEENT: Normocephalic, atraumatic Respiratory: Breathing comfortably on room air, normal effort and expansion Cardio: Regular rate by radial pulse, no cyanosis  Extremities: Warm well-perfused, moving all  Abdomen: The abdomen is flat with good contour of the waist and hip.  Umbilicus is well-positioned with slightly thickened scar but aesthetically pleasing.  There is an extended transverse abdominal scar that is approximately 10 cm from the introitus and above the underwear line.  There is a small distance between the scar and the umbilicus.  The scar is soft and flat out laterally but thickened centrally and is raised at the center aspect.  It is dark centrally.  No evidence of fluid collection or infection

## 2025-07-17 NOTE — HISTORY OF PRESENT ILLNESS
[FreeTextEntry1] : The patient is a pleasant otherwise healthy 40-year-old female who presents today to discuss her abdominoplasty results.  She [place] abdomin[place] in the city [place].  She says the scar initially was thin but has widened and darkened and is thick in the central aspect.  She also says that the scar has always been high and unsightly and she cannot wear a bathing suit because the scar is noticeable.  She recently went on a trip and this was evident.  She is interested in scar revision.  She is otherwise happy with her contour and umbilicus.  She does not smoke.

## 2025-07-17 NOTE — PLAN
[TextEntry] : Patient presents today to discuss her abdominoplasty scar,  Specifically the texture, color and location.  As for the location of the scar is just slightly above 10 cm from the the commissure.  This may be able to be lowered with excision and advancement of the abdominal flap however this would also lower the umbilicus as there is no enough tissue to transpose the umbilicus again.  This would bring the umbilicus quite low.  There may be some middle ground between 6 and 10 cm where the scar can be lowered but the umbilicus can still be in a decent position, otherwise the umbilicus would need to be released in advance superiorly and a VY closure.  As for the texture color and width of the scar, the scar is send evolve over time and and with cutting and closing there is still a chance that it could wind overtime dark and become hypertrophic.  There are no guarantees that a scar revision will produce a thin flat scar especially if there is tension with advancing the scar inferiorly.  All of her questions were answered today.  A estimate was provided for abdominal scar revision which would be complete and performed under anesthesia at [facility].

## 2025-07-31 ENCOUNTER — LABORATORY RESULT (OUTPATIENT)
Age: 40
End: 2025-07-31

## 2025-07-31 ENCOUNTER — APPOINTMENT (OUTPATIENT)
Dept: INTERNAL MEDICINE | Facility: CLINIC | Age: 40
End: 2025-07-31
Payer: COMMERCIAL

## 2025-07-31 VITALS — DIASTOLIC BLOOD PRESSURE: 70 MMHG | SYSTOLIC BLOOD PRESSURE: 120 MMHG

## 2025-07-31 DIAGNOSIS — R53.83 OTHER FATIGUE: ICD-10-CM

## 2025-07-31 DIAGNOSIS — Z87.898 PERSONAL HISTORY OF OTHER SPECIFIED CONDITIONS: ICD-10-CM

## 2025-07-31 DIAGNOSIS — E16.2 HYPOGLYCEMIA, UNSPECIFIED: ICD-10-CM

## 2025-07-31 DIAGNOSIS — E66.9 OBESITY, UNSPECIFIED: ICD-10-CM

## 2025-07-31 DIAGNOSIS — J06.9 ACUTE UPPER RESPIRATORY INFECTION, UNSPECIFIED: ICD-10-CM

## 2025-07-31 PROCEDURE — 99214 OFFICE O/P EST MOD 30 MIN: CPT

## 2025-07-31 PROCEDURE — G2211 COMPLEX E/M VISIT ADD ON: CPT

## 2025-07-31 PROCEDURE — 36415 COLL VENOUS BLD VENIPUNCTURE: CPT

## 2025-07-31 RX ADMIN — CYANOCOBALAMIN 0 MCG/ML: 1000 INJECTION, SOLUTION INTRAMUSCULAR; SUBCUTANEOUS at 00:00

## 2025-08-01 ENCOUNTER — MED ADMIN CHARGE (OUTPATIENT)
Age: 40
End: 2025-08-01

## 2025-08-01 LAB
ALBUMIN SERPL ELPH-MCNC: 4.3 G/DL
ALP BLD-CCNC: 66 U/L
ALT SERPL-CCNC: 23 U/L
ANION GAP SERPL CALC-SCNC: 12 MMOL/L
AST SERPL-CCNC: 22 U/L
BILIRUB SERPL-MCNC: 0.2 MG/DL
BUN SERPL-MCNC: 11 MG/DL
CALCIUM SERPL-MCNC: 9.4 MG/DL
CHLORIDE SERPL-SCNC: 106 MMOL/L
CO2 SERPL-SCNC: 23 MMOL/L
CREAT SERPL-MCNC: 0.68 MG/DL
EGFRCR SERPLBLD CKD-EPI 2021: 113 ML/MIN/1.73M2
GLUCOSE SERPL-MCNC: 66 MG/DL
POTASSIUM SERPL-SCNC: 4.1 MMOL/L
PROT SERPL-MCNC: 7 G/DL
SODIUM SERPL-SCNC: 141 MMOL/L

## 2025-08-01 RX ORDER — CYANOCOBALAMIN 1000 UG/ML
1000 INJECTION, SOLUTION INTRAMUSCULAR; SUBCUTANEOUS
Qty: 0 | Refills: 0 | Status: COMPLETED | OUTPATIENT
Start: 2025-07-31

## 2025-08-04 PROBLEM — E16.2 LOW GLUCOSE LEVEL: Status: ACTIVE | Noted: 2025-08-01

## 2025-08-04 LAB
APPEARANCE: CLEAR
BASOPHILS # BLD AUTO: 0.02 K/UL
BASOPHILS NFR BLD AUTO: 0.2 %
BILIRUBIN URINE: NEGATIVE
BLOOD URINE: NEGATIVE
COLOR: YELLOW
EOSINOPHIL # BLD AUTO: 0.19 K/UL
EOSINOPHIL NFR BLD AUTO: 2.4 %
ESTIMATED AVERAGE GLUCOSE: 88 MG/DL
FERRITIN SERPL-MCNC: 66 NG/ML
FOLATE SERPL-MCNC: 5.4 NG/ML
GLUCOSE QUALITATIVE U: NEGATIVE MG/DL
HBA1C MFR BLD HPLC: 4.7 %
HCT VFR BLD CALC: 37.9 %
HGB BLD-MCNC: 12.5 G/DL
IMM GRANULOCYTES NFR BLD AUTO: 0.2 %
IRON SATN MFR SERPL: 24 %
IRON SERPL-MCNC: 79 UG/DL
KETONES URINE: NEGATIVE MG/DL
LEUKOCYTE ESTERASE URINE: NEGATIVE
LYMPHOCYTES # BLD AUTO: 2.68 K/UL
LYMPHOCYTES NFR BLD AUTO: 33.4 %
MAN DIFF?: NORMAL
MCHC RBC-ENTMCNC: 28.3 PG
MCHC RBC-ENTMCNC: 33 G/DL
MCV RBC AUTO: 85.7 FL
METHYLMALONATE SERPL-SCNC: 67 NMOL/L
MONOCYTES # BLD AUTO: 0.55 K/UL
MONOCYTES NFR BLD AUTO: 6.9 %
NEUTROPHILS # BLD AUTO: 4.56 K/UL
NEUTROPHILS NFR BLD AUTO: 56.9 %
NITRITE URINE: NEGATIVE
PH URINE: 6
PLATELET # BLD AUTO: 294 K/UL
PROTEIN URINE: 30 MG/DL
RBC # BLD: 4.42 M/UL
RBC # FLD: 13.1 %
SPECIFIC GRAVITY URINE: 1.02
T4 FREE SERPL-MCNC: 1.3 NG/DL
TIBC SERPL-MCNC: 322 UG/DL
TSH SERPL-ACNC: 1.99 UIU/ML
UIBC SERPL-MCNC: 243 UG/DL
UROBILINOGEN URINE: 0.2 MG/DL
VIT B12 SERPL-MCNC: 850 PG/ML
WBC # FLD AUTO: 8.02 K/UL

## 2025-08-08 ENCOUNTER — APPOINTMENT (OUTPATIENT)
Dept: PSYCHIATRY | Facility: CLINIC | Age: 40
End: 2025-08-08
Payer: COMMERCIAL

## 2025-08-08 PROCEDURE — 99214 OFFICE O/P EST MOD 30 MIN: CPT | Mod: 95

## 2025-08-08 RX ORDER — VENLAFAXINE HYDROCHLORIDE 150 MG/1
150 CAPSULE, EXTENDED RELEASE ORAL DAILY
Qty: 30 | Refills: 1 | Status: ACTIVE | COMMUNITY
Start: 2025-08-08 | End: 1900-01-01

## 2025-09-03 ENCOUNTER — TRANSCRIPTION ENCOUNTER (OUTPATIENT)
Age: 40
End: 2025-09-03

## 2025-09-03 ENCOUNTER — APPOINTMENT (OUTPATIENT)
Dept: INTERNAL MEDICINE | Facility: CLINIC | Age: 40
End: 2025-09-03
Payer: COMMERCIAL

## 2025-09-03 DIAGNOSIS — Z77.21 CONTACT WITH AND (SUSPECTED) EXPOSURE TO POTENTIALLY HAZARDOUS BODY FLUIDS: ICD-10-CM

## 2025-09-03 PROCEDURE — 99213 OFFICE O/P EST LOW 20 MIN: CPT | Mod: 93

## 2025-09-04 ENCOUNTER — TRANSCRIPTION ENCOUNTER (OUTPATIENT)
Age: 40
End: 2025-09-04

## 2025-09-04 LAB
HCV AB SER QL: NONREACTIVE
HCV S/CO RATIO: 0.12 S/CO
HIV1+2 AB SPEC QL IA.RAPID: NONREACTIVE
T PALLIDUM AB SER QL IA: NEGATIVE

## 2025-09-10 ENCOUNTER — APPOINTMENT (OUTPATIENT)
Dept: PLASTIC SURGERY | Facility: CLINIC | Age: 40
End: 2025-09-10

## 2025-09-19 ENCOUNTER — APPOINTMENT (OUTPATIENT)
Facility: CLINIC | Age: 40
End: 2025-09-19